# Patient Record
Sex: MALE | Race: WHITE | NOT HISPANIC OR LATINO | Employment: OTHER | URBAN - METROPOLITAN AREA
[De-identification: names, ages, dates, MRNs, and addresses within clinical notes are randomized per-mention and may not be internally consistent; named-entity substitution may affect disease eponyms.]

---

## 2017-01-03 ENCOUNTER — MYC MEDICAL ADVICE (OUTPATIENT)
Dept: FAMILY MEDICINE | Facility: CLINIC | Age: 82
End: 2017-01-03

## 2017-01-03 DIAGNOSIS — J20.9 ACUTE BRONCHITIS WITH SYMPTOMS > 10 DAYS: ICD-10-CM

## 2017-01-03 DIAGNOSIS — A04.8 H. PYLORI INFECTION: ICD-10-CM

## 2017-01-03 DIAGNOSIS — R05.9 COUGH: Primary | ICD-10-CM

## 2017-01-04 RX ORDER — BENZONATATE 100 MG/1
100 CAPSULE ORAL 3 TIMES DAILY PRN
Qty: 42 CAPSULE | Refills: 3 | Status: SHIPPED | OUTPATIENT
Start: 2017-01-04 | End: 2017-07-07

## 2017-01-04 RX ORDER — CODEINE PHOSPHATE AND GUAIFENESIN 10; 100 MG/5ML; MG/5ML
1 SOLUTION ORAL EVERY 4 HOURS PRN
Qty: 120 ML | Refills: 1 | Status: CANCELLED | OUTPATIENT
Start: 2017-01-04

## 2017-01-04 RX ORDER — CLARITHROMYCIN 500 MG
500 TABLET ORAL 2 TIMES DAILY
Qty: 28 TABLET | Refills: 0 | Status: SHIPPED | OUTPATIENT
Start: 2017-01-04 | End: 2017-07-07

## 2017-01-04 NOTE — TELEPHONE ENCOUNTER
Patient requesting medication for cough. Both cough syrup and Tessalon pearls placed to approve if appropriate. Angela MOFFETT      Last Written Prescription Date:  11/9/16  Last Fill Quantity: 120,   # refills: 1  Last Office Visit with [facility] prescribing provider: 12/14/16  Future Office visit:    Next 5 appointments (look out 90 days)     Feb 15, 2017  8:00 AM   MyChart Physical Adult with Khoi Bartolome Chacko MD   [facility] ([facility])    [address] [place] [id number] [phone]                   Routing refill request to provider for review/approval because:  Drug not on the [facility] or [facility] [email or web address] refill protocol or controlled substance

## 2017-01-04 NOTE — TELEPHONE ENCOUNTER
Daughter, Ava states dad did get better but now the whole family has a rebound with patient having the clear runny nose and non productive cough and denies fever.  She questions cough meds and possible antibiotic.  Informed Ava to check at the pharmacy for Rx approvals and if no improvement to be seen VORB Dr. Khoi Chacko/Gia Brennan CMA ([facility])

## 2017-02-18 DIAGNOSIS — I10 HYPERTENSION GOAL BP (BLOOD PRESSURE) < 140/90: ICD-10-CM

## 2017-02-18 DIAGNOSIS — F41.9 ANXIETY: Primary | ICD-10-CM

## 2017-02-20 RX ORDER — HYDROCHLOROTHIAZIDE 25 MG/1
TABLET ORAL
Qty: 90 TABLET | Refills: 3 | Status: SHIPPED | OUTPATIENT
Start: 2017-02-20 | End: 2018-03-12

## 2017-02-20 RX ORDER — LORAZEPAM 0.5 MG/1
TABLET ORAL
Qty: 90 TABLET | Refills: 0 | Status: SHIPPED | OUTPATIENT
Start: 2017-02-20 | End: 2017-03-30

## 2017-02-20 NOTE — TELEPHONE ENCOUNTER
Lorazepam        Last Written Prescription Date:  12/14/16  Last Fill Quantity: 90,   # refills: 0  Last Office Visit with [facility] or  [facility] prescribing provider: 12/14/16  Future Office visit:       Routing refill request to provider for review/approval because:  Drug not on the [facility], [facility] or  [employer] refill protocol or controlled substance

## 2017-02-20 NOTE — TELEPHONE ENCOUNTER
Hydrochlorothiazide        Last Written Prescription Date: 10/5/16  Last Fill Quantity: 90, # refills: 3  Last Office Visit with [facility] or [facility] prescribing provider: 12/14/16       Potassium   Date Value Ref Range Status   11/09/2016 3.4 3.4 - 5.3 mmol/L Final     Creatinine   Date Value Ref Range Status   11/09/2016 1.06 0.66 - 1.25 mg/dL Final     BP Readings from Last 3 Encounters:   12/14/16 126/64   11/09/16 126/56   10/21/16 140/70

## 2017-02-21 NOTE — TELEPHONE ENCOUNTER
Script for Lorazepam faxed to [facility] pharmacy. Angela Rios LPN

## 2017-03-30 DIAGNOSIS — F41.9 ANXIETY: ICD-10-CM

## 2017-03-30 NOTE — TELEPHONE ENCOUNTER
Ativan       Last Written Prescription Date: 2/20/2017  Last Fill Quantity: 90,  # refills: 0   Last Office Visit with [facility], [facility] or [facility] prescribing provider: 2/15/2017

## 2017-03-31 RX ORDER — LORAZEPAM 0.5 MG/1
TABLET ORAL
Qty: 90 TABLET | Refills: 0 | Status: SHIPPED | OUTPATIENT
Start: 2017-03-31 | End: 2017-07-07

## 2017-07-07 ENCOUNTER — OFFICE VISIT (OUTPATIENT)
Dept: INTERNAL MEDICINE | Facility: CLINIC | Age: 82
End: 2017-07-07
Payer: COMMERCIAL

## 2017-07-07 VITALS
HEART RATE: 64 BPM | BODY MASS INDEX: 26.88 KG/M2 | DIASTOLIC BLOOD PRESSURE: 66 MMHG | TEMPERATURE: 97.3 F | WEIGHT: 182 LBS | OXYGEN SATURATION: 94 % | RESPIRATION RATE: 16 BRPM | SYSTOLIC BLOOD PRESSURE: 126 MMHG

## 2017-07-07 DIAGNOSIS — F41.9 ANXIETY: ICD-10-CM

## 2017-07-07 DIAGNOSIS — I10 ESSENTIAL HYPERTENSION WITH GOAL BLOOD PRESSURE LESS THAN 140/90: ICD-10-CM

## 2017-07-07 DIAGNOSIS — E78.5 HYPERLIPIDEMIA LDL GOAL <130: ICD-10-CM

## 2017-07-07 DIAGNOSIS — Z91.038 ALLERGY TO ANT BITE: ICD-10-CM

## 2017-07-07 DIAGNOSIS — R21 RASH: Primary | ICD-10-CM

## 2017-07-07 PROCEDURE — 99213 OFFICE O/P EST LOW 20 MIN: CPT | Performed by: INTERNAL MEDICINE

## 2017-07-07 RX ORDER — DESOXIMETASONE 2.5 MG/G
CREAM TOPICAL
Qty: 30 G | Refills: 2 | Status: CANCELLED | OUTPATIENT
Start: 2017-07-07

## 2017-07-07 RX ORDER — LORAZEPAM 0.5 MG/1
TABLET ORAL
Qty: 90 TABLET | Refills: 0 | Status: SHIPPED | OUTPATIENT
Start: 2017-07-07 | End: 2017-08-11

## 2017-07-07 RX ORDER — ATORVASTATIN CALCIUM 20 MG/1
TABLET, FILM COATED ORAL
Qty: 90 TABLET | Refills: 0 | Status: SHIPPED | OUTPATIENT
Start: 2017-07-07 | End: 2017-11-18

## 2017-07-07 RX ORDER — TRIAMCINOLONE ACETONIDE 5 MG/G
CREAM TOPICAL
Qty: 30 G | Refills: 1 | Status: SHIPPED | OUTPATIENT
Start: 2017-07-07 | End: 2018-06-11

## 2017-07-07 ASSESSMENT — PAIN SCALES - GENERAL: PAINLEVEL: NO PAIN (0)

## 2017-07-07 NOTE — MR AVS SNAPSHOT
After Visit Summary   7/7/2017    Tre Fischer    MRN: [id number]           Patient Information     Date Of Birth          9/8/1930        Visit Information        Provider Department      7/7/2017 4:30 PM Sin Gill MD [facility]        Today's Diagnoses     Rash    -  1    Anxiety        Hyperlipidemia LDL goal <130           Follow-ups after your visit        Your next 10 appointments already scheduled     Jul 07, 2017  4:30 PM CDT   SHORT with Sin Gill MD   [facility] ([facility])    [address] [place] [id number] [phone]              Who to contact     If you have questions or need follow up information about today's clinic visit or your schedule please contact [facility] directly at [phone].  Normal or non-critical lab and imaging results will be communicated to you by MyChart, letter or phone within 4 business days after the clinic has received the results. If you do not hear from us within 7 days, please contact the clinic through MyChart or phone. If you have a critical or abnormal lab result, we will notify you by phone as soon as possible.  Submit refill requests through [email or web address] or call your pharmacy and they will forward the refill request to us. Please allow 3 business days for your refill to be completed.          Additional Information About Your Visit        MyChart Information     [email or web address] gives you secure access to your electronic health record. If you see a primary care provider, you can also send messages to your care team and make appointments. If you have questions, please call your primary care clinic.  If you do not have a primary care provider, please call [phone] and they will assist you.        Care EveryWhere ID     This is your Care EveryWhere ID. This could be used by other organizations to access your [facility] medical records  [id number]        Your Vitals Were      Pulse Temperature Respirations Pulse Oximetry BMI (Body Mass Index)       64 97.3  F (36.3  C) (Temporal) 16 94% 26.88 kg/m2        Blood Pressure from Last 3 Encounters:   07/07/17 126/66   12/14/16 126/64   11/09/16 126/56    Weight from Last 3 Encounters:   07/07/17 182 lb (82.6 kg)   12/14/16 170 lb (77.1 kg)   11/09/16 166 lb (75.3 kg)              Today, you had the following     No orders found for display       Primary Care Provider Office Phone # Fax #    Khoi Bartolome Chacko [phone]       [facility] [address] [place] [address]        Equal Access to Services     EDNA AVILES : Hadii aad ku hadasho Soomaali, waaxda luqadaha, qaybta kaalmada adeegyada, waxlenin mcdonaldin melquiades phillips. So [facility] [phone].    ATENCIÓN: Si habla español, tiene a harman disposición servicios gratuitos de asistencia lingüística. Llame al [phone].    We comply with applicable federal civil rights laws and [place] laws. We do not discriminate on the basis of race, color, national origin, age, disability sex, sexual orientation or gender identity.            Thank you!     Thank you for choosing [facility]  for your care. Our goal is always to provide you with excellent care. Hearing back from our patients is one way we can continue to improve our services. Please take a few minutes to complete the written survey that you may receive in the mail after your visit with us. Thank you!             Your Updated Medication List - Protect others around you: Learn how to safely use, store and throw away your medicines at www.disposemymeds.org.          This list is accurate as of: 7/7/17  4:20 PM.  Always use your most recent med list.                   Brand Name Dispense Instructions for use Diagnosis    ascorbic acid 1000 MG Tabs    vitamin C     1 TABLET DAILY        atenolol 50 MG tablet    TENORMIN    180 tablet    One tablet twice a day    Essential  hypertension with goal blood pressure less than 140/90       atorvastatin 20 MG tablet    LIPITOR    90 tablet    1 tab PO QD (Once per day))    Hyperlipidemia LDL goal <130       azelastine 0.1 % spray    ASTELIN    30 mL    SPRAY 1-2 SPRAYS INTO BOTH NOSTRILS 2 TIMES DAILY    Chronic rhinitis       Fish Oil 500 MG Caps     30 capsule    Take  by mouth daily.    Hyperlipidemia LDL goal <130       glucosamine msm complex Tabs tablet      Take  by mouth daily.    Arthritis of left ankle or foot, Osteoarthrosis, unspecified whether generalized or localized, unspecified site       hydrochlorothiazide 25 MG tablet    HYDRODIURIL    90 tablet    TAKE 1 TABLET (25 MG) BY MOUTH DAILY    Hypertension goal BP (blood pressure) < 140/90       LORazepam 0.5 MG tablet    ATIVAN    90 tablet    TAKE ONE TABLET BY MOUTH EVERY 8 HOURS AS NEEDED FOR ANXIETY    Anxiety       Multiple vitamin  s-Minerals Caps      one every day        omeprazole 20 MG CR capsule    priLOSEC    180 capsule    Take 1 capsule (20 mg) by mouth 2 times daily    H. pylori infection       tamsulosin 0.4 MG capsule    FLOMAX    90 capsule    Take 1 capsule (0.4 mg) by mouth daily As needed    Prostate hypertrophy       TYLENOL PM EXTRA STRENGTH  MG tablet   Generic drug:  diphenhydrAMINE-acetaminophen     15 tablet    Take 1 tablet by mouth nightly as needed.        vitamin D 2000 UNITS tablet     100 tablet    Take 2,000 Units by mouth daily    Osteoarthritis of shoulder region

## 2017-07-07 NOTE — NURSING NOTE
"Chief Complaint   Patient presents with     Derm Problem     check spots on abdomen       Initial /66  Pulse 64  Temp 97.3  F (36.3  C) (Temporal)  Resp 16  Wt 182 lb (82.6 kg)  SpO2 94%  BMI 26.88 kg/m2 Estimated body mass index is 26.88 kg/(m^2) as calculated from the following:    Height as of 10/21/16: 5' 9\" (1.753 m).    Weight as of this encounter: 182 lb (82.6 kg).  Medication Reconciliation: complete    "

## 2017-07-07 NOTE — PROGRESS NOTES
SUBJECTIVE:                                                    Tre Fischer is a 86 year old male who presents to clinic today for the following health issues:    Chief Complaint   Patient presents with     Derm Problem     check spots on abdomen     2 inch spots on his abdomen for 2 days, possibly ant bites.      Itching.    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     Current Outpatient Prescriptions   Medication     LORazepam (ATIVAN) 0.5 MG tablet     atorvastatin (LIPITOR) 20 MG tablet     triamcinolone (KENALOG) 0.5 % cream     hydrochlorothiazide (HYDRODIURIL) 25 MG tablet     tamsulosin (FLOMAX) 0.4 MG capsule     azelastine (ASTELIN) 0.1 % spray     atenolol (TENORMIN) 50 MG tablet     omeprazole (PRILOSEC) 20 MG capsule     Cholecalciferol (VITAMIN D) 2000 UNITS tablet     Omega-3 Fatty Acids (FISH OIL) 500 MG CAPS     Glucos-MSM-C-Mi-Tpetwj-Qxglqf (GLUCOSAMINE MSM COMPLEX) TABS     diphenhydrAMINE-acetaminophen (TYLENOL PM EXTRA STRENGTH)  MG tablet     MULTIPLE VITAMINS-MINERALS OR CAPS     VITAMIN C 1000 MG OR TABS     [DISCONTINUED] atorvastatin (LIPITOR) 20 MG tablet     [DISCONTINUED] hydrochlorothiazide (HYDRODIURIL) 25 MG tablet     No current facility-administered medications for this visit.      Physical Exam  /66  Pulse 64  Temp 97.3  F (36.3  C) (Temporal)  Resp 16  Wt 182 lb (82.6 kg)  SpO2 94%  BMI 26.88 kg/m2  General Appearance-healthy, alert, no distress  5 lesions that are 2 inches wide on his abdomen-center lesions like a bite.    ASSESSMENT:  Ant bites with reaction, not infected but allergic reaction. Will avoid benadryl due to his age and risk. Continue with a new prescription of steroid cream and can use otc  antihistamine.    Electronically signed by Sin Gill MD

## 2017-08-08 ENCOUNTER — TELEPHONE (OUTPATIENT)
Dept: FAMILY MEDICINE | Facility: CLINIC | Age: 82
End: 2017-08-08

## 2017-08-08 NOTE — TELEPHONE ENCOUNTER
Reason for Call:  Same Day Appointment, Requested Provider:  Khoi Chacko M.D.    PCP: Khoi Chacko    Reason for visit:  Cough, fatigue.  Unable to sleep due to cough.  Acid reflux    Duration of symptoms: 3-4 days    Have you been treated for this in the past? Yes    Additional comments: would like him to see Dr Chacko sometime this week.  Pt is aware that Dr Chacko is not in today    Can we leave a detailed message on this number? YES    Phone number patient can be reached at: [phone] (J)    Best Time: any    Call taken on 8/8/2017 at 12:22 PM by Annie Pedro

## 2017-08-09 NOTE — TELEPHONE ENCOUNTER
Daughter, Farzana confirmed appointment for Friday and was instructed if they do not feel Tre can wait to be seen due to dyspnea to be seen by another provider or go to the emergency room VORB Dr. Khoi Chacko/Gia Brennan CMA ([facility]) .  Daughter states she will be checking with Tre this am and will schedule with another provider or go to ER if need//Gia Brennan/ANA([facility])

## 2017-08-11 ENCOUNTER — TELEPHONE (OUTPATIENT)
Dept: FAMILY MEDICINE | Facility: CLINIC | Age: 82
End: 2017-08-11

## 2017-08-11 ENCOUNTER — OFFICE VISIT (OUTPATIENT)
Dept: FAMILY MEDICINE | Facility: CLINIC | Age: 82
End: 2017-08-11
Payer: COMMERCIAL

## 2017-08-11 VITALS
TEMPERATURE: 97.9 F | RESPIRATION RATE: 16 BRPM | OXYGEN SATURATION: 98 % | HEART RATE: 68 BPM | SYSTOLIC BLOOD PRESSURE: 138 MMHG | DIASTOLIC BLOOD PRESSURE: 60 MMHG | WEIGHT: 180 LBS | BODY MASS INDEX: 26.58 KG/M2

## 2017-08-11 DIAGNOSIS — E78.5 HYPERLIPIDEMIA LDL GOAL <130: ICD-10-CM

## 2017-08-11 DIAGNOSIS — D50.8 IRON DEFICIENCY ANEMIA SECONDARY TO INADEQUATE DIETARY IRON INTAKE: ICD-10-CM

## 2017-08-11 DIAGNOSIS — I10 ESSENTIAL HYPERTENSION WITH GOAL BLOOD PRESSURE LESS THAN 140/90: ICD-10-CM

## 2017-08-11 DIAGNOSIS — E63.9 NUTRITIONAL DEFICIENCY: ICD-10-CM

## 2017-08-11 DIAGNOSIS — J20.9 ACUTE BRONCHITIS WITH SYMPTOMS > 10 DAYS: Primary | ICD-10-CM

## 2017-08-11 DIAGNOSIS — B35.1 DERMATOPHYTOSIS OF NAIL: ICD-10-CM

## 2017-08-11 DIAGNOSIS — F41.9 ANXIETY: ICD-10-CM

## 2017-08-11 LAB
ALBUMIN SERPL-MCNC: 3.3 G/DL (ref 3.4–5)
ALP SERPL-CCNC: 120 U/L (ref 40–150)
ALT SERPL W P-5'-P-CCNC: 26 U/L (ref 0–70)
ANION GAP SERPL CALCULATED.3IONS-SCNC: 5 MMOL/L (ref 3–14)
AST SERPL W P-5'-P-CCNC: 21 U/L (ref 0–45)
BILIRUB SERPL-MCNC: 0.7 MG/DL (ref 0.2–1.3)
BUN SERPL-MCNC: 16 MG/DL (ref 7–30)
CALCIUM SERPL-MCNC: 8.8 MG/DL (ref 8.5–10.1)
CHLORIDE SERPL-SCNC: 94 MMOL/L (ref 94–109)
CHOLEST SERPL-MCNC: 99 MG/DL
CO2 SERPL-SCNC: 32 MMOL/L (ref 20–32)
CREAT SERPL-MCNC: 1.07 MG/DL (ref 0.66–1.25)
ERYTHROCYTE [DISTWIDTH] IN BLOOD BY AUTOMATED COUNT: 13.2 % (ref 10–15)
GFR SERPL CREATININE-BSD FRML MDRD: 65 ML/MIN/1.7M2
GLUCOSE SERPL-MCNC: 89 MG/DL (ref 70–99)
HCT VFR BLD AUTO: 36.6 % (ref 40–53)
HDLC SERPL-MCNC: 43 MG/DL
HGB BLD-MCNC: 12.6 G/DL (ref 13.3–17.7)
LDLC SERPL CALC-MCNC: 36 MG/DL
MCH RBC QN AUTO: 30.7 PG (ref 26.5–33)
MCHC RBC AUTO-ENTMCNC: 34.4 G/DL (ref 31.5–36.5)
MCV RBC AUTO: 89 FL (ref 78–100)
NONHDLC SERPL-MCNC: 56 MG/DL
PLATELET # BLD AUTO: 164 10E9/L (ref 150–450)
POTASSIUM SERPL-SCNC: 4.1 MMOL/L (ref 3.4–5.3)
PROT SERPL-MCNC: 6.4 G/DL (ref 6.8–8.8)
RBC # BLD AUTO: 4.1 10E12/L (ref 4.4–5.9)
SODIUM SERPL-SCNC: 131 MMOL/L (ref 133–144)
TRIGL SERPL-MCNC: 100 MG/DL
WBC # BLD AUTO: 8.8 10E9/L (ref 4–11)

## 2017-08-11 PROCEDURE — 99213 OFFICE O/P EST LOW 20 MIN: CPT | Performed by: FAMILY MEDICINE

## 2017-08-11 PROCEDURE — 85027 COMPLETE CBC AUTOMATED: CPT | Performed by: FAMILY MEDICINE

## 2017-08-11 PROCEDURE — 80053 COMPREHEN METABOLIC PANEL: CPT | Performed by: FAMILY MEDICINE

## 2017-08-11 PROCEDURE — 36415 COLL VENOUS BLD VENIPUNCTURE: CPT | Performed by: FAMILY MEDICINE

## 2017-08-11 PROCEDURE — 80061 LIPID PANEL: CPT | Performed by: FAMILY MEDICINE

## 2017-08-11 RX ORDER — KETOCONAZOLE 20 MG/G
CREAM TOPICAL 2 TIMES DAILY
Qty: 30 G | Refills: 1 | Status: SHIPPED | OUTPATIENT
Start: 2017-08-11 | End: 2018-11-06

## 2017-08-11 RX ORDER — LORAZEPAM 0.5 MG/1
TABLET ORAL
Qty: 90 TABLET | Refills: 0 | Status: SHIPPED | OUTPATIENT
Start: 2017-08-11 | End: 2017-11-18

## 2017-08-11 RX ORDER — CETIRIZINE HYDROCHLORIDE 10 MG/1
10 TABLET ORAL EVERY EVENING
Qty: 30 TABLET | Refills: 1 | COMMUNITY
Start: 2017-08-11 | End: 2019-03-27

## 2017-08-11 RX ORDER — CLARITHROMYCIN 500 MG
500 TABLET ORAL 2 TIMES DAILY
Qty: 20 TABLET | Refills: 0 | Status: SHIPPED | OUTPATIENT
Start: 2017-08-11 | End: 2018-06-11

## 2017-08-11 RX ORDER — PREDNISONE 20 MG/1
20 TABLET ORAL 2 TIMES DAILY
Qty: 10 TABLET | Refills: 0 | Status: SHIPPED | OUTPATIENT
Start: 2017-08-11 | End: 2018-06-11

## 2017-08-11 ASSESSMENT — ANXIETY QUESTIONNAIRES
1. FEELING NERVOUS, ANXIOUS, OR ON EDGE: NOT AT ALL
IF YOU CHECKED OFF ANY PROBLEMS ON THIS QUESTIONNAIRE, HOW DIFFICULT HAVE THESE PROBLEMS MADE IT FOR YOU TO DO YOUR WORK, TAKE CARE OF THINGS AT HOME, OR GET ALONG WITH OTHER PEOPLE: NOT DIFFICULT AT ALL
3. WORRYING TOO MUCH ABOUT DIFFERENT THINGS: NOT AT ALL
GAD7 TOTAL SCORE: 0
2. NOT BEING ABLE TO STOP OR CONTROL WORRYING: NOT AT ALL
6. BECOMING EASILY ANNOYED OR IRRITABLE: NOT AT ALL
5. BEING SO RESTLESS THAT IT IS HARD TO SIT STILL: NOT AT ALL
7. FEELING AFRAID AS IF SOMETHING AWFUL MIGHT HAPPEN: NOT AT ALL

## 2017-08-11 ASSESSMENT — PATIENT HEALTH QUESTIONNAIRE - PHQ9
5. POOR APPETITE OR OVEREATING: NOT AT ALL
SUM OF ALL RESPONSES TO PHQ QUESTIONS 1-9: 0

## 2017-08-11 ASSESSMENT — PAIN SCALES - GENERAL: PAINLEVEL: NO PAIN (0)

## 2017-08-11 NOTE — TELEPHONE ENCOUNTER
Pharmacist,Lou  notified ok to dispense Flomax VORB Dr. Khoi Chacko/Gia Brennan CMA ([facility])

## 2017-08-11 NOTE — NURSING NOTE
"Chief Complaint   Patient presents with     Cough     Fatigue       Initial /62 (BP Location: Left arm, Patient Position: Chair, Cuff Size: Adult Regular)  Pulse 68  Temp 97.9  F (36.6  C) (Temporal)  Resp 16  Wt 180 lb (81.6 kg)  SpO2 98%  BMI 26.58 kg/m2 Estimated body mass index is 26.58 kg/(m^2) as calculated from the following:    Height as of 10/21/16: 5' 9\" (1.753 m).    Weight as of this encounter: 180 lb (81.6 kg).   Signed original Prescription for Lorazepam faxed to [facility] Dr. Khoi Chacko/Gia Brennan CMA ([facility])   Medication Reconciliation: complete  "

## 2017-08-11 NOTE — TELEPHONE ENCOUNTER
Pharmacy calling to alert Dr Chacko that there is a medication interaction with a prescription he wrote today and pts generic flomax.  Please call and advise.

## 2017-08-11 NOTE — PROGRESS NOTES
Tre Fischer is a 86 year old male who presents to clinic today for Cough and Fatigue   He complains of cough    For more than the last week patient has had a productive cough with colored sputum, some chills and just feeling tired and fatigued. He had this once before in the last year required antibiotics. His usual nasal symptoms are about the same. Some mild nasal congestion but not a lot of actual runny discharge. It is worse at night. He does not have cardiac symptoms. His legs do not swell. All his medications are the same.    Patient has been treating his toenails with Vicks 4 months. This somewhat keeps the presumed fungal problem in control. He is starting to see that some of his fingernails are starting to look the same with discoloration yellow in nature, irregularity of the nail.     He is well known to me.    Past medical, surgical, social histories were reviewed and updated.  Social History   Substance Use Topics     Smoking status: Former Smoker     Packs/day: 1.00     Years: 10.00     Types: Cigarettes     Quit date: 1/1/1972     Smokeless tobacco: Never Used     Alcohol use No     Current Outpatient Prescriptions   Medication Sig     cetirizine (ZYRTEC) 10 MG tablet Take 1 tablet (10 mg) by mouth every evening     LORazepam (ATIVAN) 0.5 MG tablet TAKE ONE TABLET BY MOUTH EVERY 8 HOURS AS NEEDED FOR ANXIETY     clarithromycin (BIAXIN) 500 MG tablet Take 1 tablet (500 mg) by mouth 2 times daily     predniSONE (DELTASONE) 20 MG tablet Take 1 tablet (20 mg) by mouth 2 times daily     ketoconazole (NIZORAL) 2 % cream Apply topically 2 times daily     atorvastatin (LIPITOR) 20 MG tablet 1 tab PO QD (Once per day))     hydrochlorothiazide (HYDRODIURIL) 25 MG tablet TAKE 1 TABLET (25 MG) BY MOUTH DAILY     tamsulosin (FLOMAX) 0.4 MG capsule Take 1 capsule (0.4 mg) by mouth daily As needed     atenolol (TENORMIN) 50 MG tablet One tablet twice a day     omeprazole (PRILOSEC) 20 MG capsule Take 1 capsule  (20 mg) by mouth 2 times daily     Cholecalciferol (VITAMIN D) 2000 UNITS tablet Take 2,000 Units by mouth daily     Omega-3 Fatty Acids (FISH OIL) 500 MG CAPS Take  by mouth daily.     Glucos-MSM-C-Re-Hrhzzs-Pzitbj (GLUCOSAMINE MSM COMPLEX) TABS Take  by mouth daily.     diphenhydrAMINE-acetaminophen (TYLENOL PM EXTRA STRENGTH)  MG tablet Take 1 tablet by mouth nightly as needed.     MULTIPLE VITAMINS-MINERALS OR CAPS one every day     VITAMIN C 1000 MG OR TABS 1 TABLET DAILY     triamcinolone (KENALOG) 0.5 % cream Apply sparingly to affected area three times daily.     azelastine (ASTELIN) 0.1 % spray SPRAY 1-2 SPRAYS INTO BOTH NOSTRILS 2 TIMES DAILY     No current facility-administered medications for this visit.        Allergies   Allergen Reactions     No Known Drug Allergies        Patient Active Problem List   Diagnosis     Impotence of organic origin     Varicose veins of legs     Prostate hypertrophy     Advance care planning     History  of basal cell carcinoma     Chronic rhinitis     Chronic left shoulder pain     Reflux esophagitis     Iron deficiency anemia     Essential hypertension with goal blood pressure less than 140/90     Anxiety       REVIEW OF SYSTEMS:  Constitutional, HEENT, cardiovascular, pulmonary, gi and gu systems are negative, except as otherwise noted.  States also just his usual complaints of age with more tiredness, fatigue, aches and pains.      EXAM:/60  Pulse 68  Temp 97.9  F (36.6  C) (Temporal)  Resp 16  Wt 180 lb (81.6 kg)  SpO2 98%  BMI 26.58 kg/m2  GENERAL APPEARANCE: healthy, alert, mild distress, cooperative and fatigued  HENT: ear canals and TM's normal and nose and mouth without ulcers or lesions  NECK: no adenopathy, no asymmetry, masses, or scars and thyroid normal to palpation  RESP: Slightly diminished breath sounds but clear. With breathing he developed cough which sounds productive.  CV: Regular with no edema of extremities  SKIN: Thickened index  and thumb nail with irregularity on both hands. Other fingernails look okay. Did not assess toenails today  Results for orders placed or performed in visit on 08/11/17   Lipid Profile (Chol, Trig, HDL, LDL calc)   Result Value Ref Range    Cholesterol 99 <200 mg/dL    Triglycerides 100 <150 mg/dL    HDL Cholesterol 43 >39 mg/dL    LDL Cholesterol Calculated 36 <100 mg/dL    Non HDL Cholesterol 56 <130 mg/dL   Comprehensive metabolic panel (BMP + Alb, Alk Phos, ALT, AST, Total. Bili, TP)   Result Value Ref Range    Sodium 131 (L) 133 - 144 mmol/L    Potassium 4.1 3.4 - 5.3 mmol/L    Chloride 94 94 - 109 mmol/L    Carbon Dioxide 32 20 - 32 mmol/L    Anion Gap 5 3 - 14 mmol/L    Glucose 89 70 - 99 mg/dL    Urea Nitrogen 16 7 - 30 mg/dL    Creatinine 1.07 0.66 - 1.25 mg/dL    GFR Estimate 65 >60 mL/min/1.7m2    GFR Estimate If Black 79 >60 mL/min/1.7m2    Calcium 8.8 8.5 - 10.1 mg/dL    Bilirubin Total 0.7 0.2 - 1.3 mg/dL    Albumin 3.3 (L) 3.4 - 5.0 g/dL    Protein Total 6.4 (L) 6.8 - 8.8 g/dL    Alkaline Phosphatase 120 40 - 150 U/L    ALT 26 0 - 70 U/L    AST 21 0 - 45 U/L   CBC with platelets   Result Value Ref Range    WBC 8.8 4.0 - 11.0 10e9/L    RBC Count 4.10 (L) 4.4 - 5.9 10e12/L    Hemoglobin 12.6 (L) 13.3 - 17.7 g/dL    Hematocrit 36.6 (L) 40.0 - 53.0 %    MCV 89 78 - 100 fl    MCH 30.7 26.5 - 33.0 pg    MCHC 34.4 31.5 - 36.5 g/dL    RDW 13.2 10.0 - 15.0 %    Platelet Count 164 150 - 450 10e9/L   .    ASSESSMENT:    ICD-10-CM    1. Acute bronchitis with symptoms > 10 days J20.9 clarithromycin (BIAXIN) 500 MG tablet     predniSONE (DELTASONE) 20 MG tablet   2. Anxiety F41.9 LORazepam (ATIVAN) 0.5 MG tablet   3. Dermatophytosis of nail B35.1 ketoconazole (NIZORAL) 2 % cream   4. Hyperlipidemia LDL goal <130 E78.5 Lipid Profile (Chol, Trig, HDL, LDL calc)     Comprehensive metabolic panel (BMP + Alb, Alk Phos, ALT, AST, Total. Bili, TP)   5. Essential hypertension with goal blood pressure less than 140/90 I10  Comprehensive metabolic panel (BMP + Alb, Alk Phos, ALT, AST, Total. Bili, TP)     CBC with platelets   6. Iron deficiency anemia secondary to inadequate dietary iron intake D50.8 CBC with platelets       PLAN:  Clarithromycin which worked last time he had this infection after trial of other antibiotics will be used today.  Topical ketoconazole for the nails now. Continue Vicks.  I do not think his cholesterol medications are problem. Laboratory done today.  Also checking laboratory because of hypertension and previous iron deficiency anemia.      Dicussed general issues around the treatment, evaluation and prevetion  Reviewed medications and side effects in detail.  Return to clinic 3 months or longer for routine care sooner if this episode is not clear. He is encouraged to eat more because of low albumin and low protein. His hemoglobin is better at 12.2 but still not back to normal.  AVS printed. Electronically signed by Khoi Chacko MD.

## 2017-08-11 NOTE — MR AVS SNAPSHOT
After Visit Summary   8/11/2017    Tre Fischer    MRN: [id number]           Patient Information     Date Of Birth          9/8/1930        Visit Information        Provider Department      8/11/2017 9:00 AM Khoi Chacko MD [facility]        Today's Diagnoses     Acute bronchitis with symptoms > 10 days    -  1    Anxiety        Dermatophytosis of nail        Hyperlipidemia LDL goal <130        Essential hypertension with goal blood pressure less than 140/90        Iron deficiency anemia secondary to inadequate dietary iron intake          Care Instructions    Use the antibiotics twice daily and prednisone 5 days once.   Keep up all other treatment.   I expect noticed relief early next week but really good in week to 10 days.   Use new cream and Vicks once daily          Follow-ups after your visit        Who to contact     If you have questions or need follow up information about today's clinic visit or your schedule please contact [facility] directly at [phone].  Normal or non-critical lab and imaging results will be communicated to you by MyChart, letter or phone within 4 business days after the clinic has received the results. If you do not hear from us within 7 days, please contact the clinic through [facility] or phone. If you have a critical or abnormal lab result, we will notify you by phone as soon as possible.  Submit refill requests through [employer] or call your pharmacy and they will forward the refill request to us. Please allow 3 business days for your refill to be completed.          Additional Information About Your Visit        MyChart Information     [facility] gives you secure access to your electronic health record. If you see a primary care provider, you can also send messages to your care team and make appointments. If you have questions, please call your primary care clinic.  If you do not have a primary care provider, please call  [phone] and they will assist you.        Care EveryWhere ID     This is your Care EveryWhere ID. This could be used by other organizations to access your [place] medical records  [id number]        Your Vitals Were     Pulse Temperature Respirations Pulse Oximetry BMI (Body Mass Index)       68 97.9  F (36.6  C) (Temporal) 16 98% 26.58 kg/m2        Blood Pressure from Last 3 Encounters:   08/11/17 138/60   07/07/17 126/66   12/14/16 126/64    Weight from Last 3 Encounters:   08/11/17 180 lb (81.6 kg)   07/07/17 182 lb (82.6 kg)   12/14/16 170 lb (77.1 kg)              We Performed the Following     CBC with platelets     Comprehensive metabolic panel (BMP + Alb, Alk Phos, ALT, AST, Total. Bili, TP)     Lipid Profile (Chol, Trig, HDL, LDL calc)          Today's Medication Changes          These changes are accurate as of: 8/11/17  9:25 AM.  If you have any questions, ask your nurse or doctor.               Start taking these medicines.        Dose/Directions    clarithromycin 500 MG tablet   Commonly known as:  BIAXIN   Used for:  Acute bronchitis with symptoms > 10 days   Started by:  Khoi Chacko MD        Dose:  500 mg   Take 1 tablet (500 mg) by mouth 2 times daily   Quantity:  20 tablet   Refills:  0       ketoconazole 2 % cream   Commonly known as:  NIZORAL   Used for:  Dermatophytosis of nail   Started by:  Khoi Chacko MD        Apply topically 2 times daily   Quantity:  30 g   Refills:  1       predniSONE 20 MG tablet   Commonly known as:  DELTASONE   Used for:  Acute bronchitis with symptoms > 10 days   Started by:  Khoi Chacko MD        Dose:  20 mg   Take 1 tablet (20 mg) by mouth 2 times daily   Quantity:  10 tablet   Refills:  0            Where to get your medicines      These medications were sent to [facility] [place] [address]  [address] [place] [address], [place] [address]     Phone:  [phone]      clarithromycin 500 MG tablet    ketoconazole 2 % cream    predniSONE 20 MG tablet         Some of these will need a paper prescription and others can be bought over the counter.  Ask your nurse if you have questions.     Bring a paper prescription for each of these medications     LORazepam 0.5 MG tablet                Primary Care Provider Office Phone # Fax #    Khoi Chacko [phone]       [address] [place] [address]        Equal Access to Services     EDNA AVILES : Hadii aad ku hadasho Soomaali, waaxda luqadaha, qaybta kaalmada adeegyada, waxay idiin hayaan adeeg kharalianne benavidez . So [facility] [phone].    ATENCIÓN: Si habla español, tiene a harman disposición servicios gratuitos de asistencia lingüística. [facility] [phone].    We comply with applicable federal civil rights laws and [place] laws. We do not discriminate on the basis of race, color, national origin, age, disability sex, sexual orientation or gender identity.            Thank you!     Thank you for choosing [facility]  for your care. Our goal is always to provide you with excellent care. Hearing back from our patients is one way we can continue to improve our services. Please take a few minutes to complete the written survey that you may receive in the mail after your visit with us. Thank you!             Your Updated Medication List - Protect others around you: Learn how to safely use, store and throw away your medicines at www.disposemymeds.org.          This list is accurate as of: 8/11/17  9:25 AM.  Always use your most recent med list.                   Brand Name Dispense Instructions for use Diagnosis    ascorbic acid 1000 MG Tabs    vitamin C     1 TABLET DAILY        atenolol 50 MG tablet    TENORMIN    180 tablet    One tablet twice a day    Essential hypertension with goal blood pressure less than 140/90       atorvastatin 20 MG tablet    LIPITOR    90 tablet    1 tab PO QD (Once  per day))    Hyperlipidemia LDL goal <130       azelastine 0.1 % spray    ASTELIN    30 mL    SPRAY 1-2 SPRAYS INTO BOTH NOSTRILS 2 TIMES DAILY    Chronic rhinitis       cetirizine 10 MG tablet    zyrTEC    30 tablet    Take 1 tablet (10 mg) by mouth every evening        clarithromycin 500 MG tablet    BIAXIN    20 tablet    Take 1 tablet (500 mg) by mouth 2 times daily    Acute bronchitis with symptoms > 10 days       Fish Oil 500 MG Caps     30 capsule    Take  by mouth daily.    Hyperlipidemia LDL goal <130       glucosamine msm complex Tabs tablet      Take  by mouth daily.    Arthritis of left ankle or foot, Osteoarthrosis, unspecified whether generalized or localized, unspecified site       hydrochlorothiazide 25 MG tablet    HYDRODIURIL    90 tablet    TAKE 1 TABLET (25 MG) BY MOUTH DAILY    Hypertension goal BP (blood pressure) < 140/90       ketoconazole 2 % cream    NIZORAL    30 g    Apply topically 2 times daily    Dermatophytosis of nail       LORazepam 0.5 MG tablet    ATIVAN    90 tablet    TAKE ONE TABLET BY MOUTH EVERY 8 HOURS AS NEEDED FOR ANXIETY    Anxiety       Multiple vitamin  s-Minerals Caps      one every day        omeprazole 20 MG CR capsule    priLOSEC    180 capsule    Take 1 capsule (20 mg) by mouth 2 times daily    H. pylori infection       predniSONE 20 MG tablet    DELTASONE    10 tablet    Take 1 tablet (20 mg) by mouth 2 times daily    Acute bronchitis with symptoms > 10 days       tamsulosin 0.4 MG capsule    FLOMAX    90 capsule    Take 1 capsule (0.4 mg) by mouth daily As needed    Prostate hypertrophy       triamcinolone 0.5 % cream    KENALOG    30 g    Apply sparingly to affected area three times daily.    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        vitamin D 2000 UNITS tablet     100 tablet    Take 2,000 Units by mouth daily     Osteoarthritis of shoulder region

## 2017-08-11 NOTE — PATIENT INSTRUCTIONS
Use the antibiotics twice daily and prednisone 5 days once.   Keep up all other treatment.   I expect noticed relief early next week but really good in week to 10 days.   Use new cream and Vicks once daily

## 2017-08-12 ASSESSMENT — ANXIETY QUESTIONNAIRES: GAD7 TOTAL SCORE: 0

## 2017-11-18 DIAGNOSIS — E78.5 HYPERLIPIDEMIA LDL GOAL <130: ICD-10-CM

## 2017-11-18 DIAGNOSIS — A04.8 H. PYLORI INFECTION: ICD-10-CM

## 2017-11-18 DIAGNOSIS — F41.9 ANXIETY: ICD-10-CM

## 2017-11-20 DIAGNOSIS — F41.9 ANXIETY: ICD-10-CM

## 2017-11-20 RX ORDER — LORAZEPAM 0.5 MG/1
TABLET ORAL
Qty: 90 TABLET | Refills: 0 | Status: SHIPPED | OUTPATIENT
Start: 2017-11-20 | End: 2017-11-20

## 2017-11-20 NOTE — TELEPHONE ENCOUNTER
LORazepam (ATIVAN) 0.5 MG tablet 90 tablet 0 11/20/2017  No      Sig: TAKE 1 TABLET BY MOUTH EVERY 8 HOURS AS NEEDED FOR ANXIETY     Patient just had filled today.  Fabio Hebert MA

## 2017-11-20 NOTE — TELEPHONE ENCOUNTER
LORazepam (ATIVAN) 0.5 MG tablet      Last Written Prescription Date:  8/11/17  Last Fill Quantity: 90,   # refills: 0  Future Office visit:       Routing refill request to provider for review/approval because:  Drug not on the G, P or [facility] refill protocol or controlled substance

## 2017-11-20 NOTE — TELEPHONE ENCOUNTER
Requested Prescriptions   Pending Prescriptions Disp Refills     atorvastatin (LIPITOR) 20 MG tablet [Pharmacy Med Name: ATORVASTATIN 20 MG TABLET 20 TAB] 90 tablet 0     Sig: TAKE 1 TABLET BY MOUTH ONCE DAILY    Statins Protocol Passed    11/18/2017  2:58 PM       Passed - LDL on file in past 12 months    Recent Labs   Lab Test  08/11/17   0928   LDL  36            Passed - No abnormal creatine kinase in past 12 months    No lab results found.         Passed - Recent or future visit with authorizing provider    Patient had office visit in the last year or has a visit in the next 30 days with authorizing provider.  See chart review.              Passed - Patient is age 18 or older

## 2017-11-22 RX ORDER — LORAZEPAM 0.5 MG/1
TABLET ORAL
Qty: 90 TABLET | Refills: 0 | Status: SHIPPED | OUTPATIENT
Start: 2017-11-22 | End: 2018-01-24

## 2017-11-22 RX ORDER — ATORVASTATIN CALCIUM 20 MG/1
TABLET, FILM COATED ORAL
Qty: 90 TABLET | Refills: 0 | Status: SHIPPED | OUTPATIENT
Start: 2017-11-22 | End: 2018-03-12

## 2018-01-15 DIAGNOSIS — J31.0 CHRONIC RHINITIS: ICD-10-CM

## 2018-01-15 DIAGNOSIS — R05.9 COUGH: ICD-10-CM

## 2018-01-16 NOTE — TELEPHONE ENCOUNTER
"Requested Prescriptions   Pending Prescriptions Disp Refills     benzonatate (TESSALON) 100 MG capsule [Pharmacy Med Name: BENZONATATE 100 MG  CAP] 42 capsule 3     Sig: TAKE 1 CAPSULE (100 MG) BY MOUTH 3 TIMES DAILY AS NEEDED FOR COUGH    There is no refill protocol information for this order        azelastine (ASTELIN) 0.1 % spray [Pharmacy Med Name: AZELASTINE 0.1% (137 MCG) S 0.1 SOLN] 30 mL 5     Sig: SPRAY 1-2 SPRAYS INTO BOTH NOSTRILS 2 TIMES DAILY    Antihistamines Protocol Passed    1/15/2018 11:22 AM       Passed - Recent or future visit with authorizing provider's specialty    Patient had office visit in the last year or has a visit in the next 30 days with authorizing provider.  See \"Patient Info\" tab in inbasket, or \"Choose Columns\" in Meds & Orders section of the refill encounter.              Passed - Patient is age 3 or older    Apply age and/or weight-based dosing for peds patients age 3 and older.    Forward request to provider for patients under the age of 3.            Last Written Prescription Date:  12/14/16  Last Fill Quantity: 30 mL,  # refills: 5   Last Office Visit with [facility] or [facility] prescribing provider:  8/11/17   Future Office Visit:       "

## 2018-01-18 RX ORDER — AZELASTINE 1 MG/ML
SPRAY, METERED NASAL
Qty: 30 ML | Refills: 7 | Status: SHIPPED | OUTPATIENT
Start: 2018-01-18 | End: 2018-01-19

## 2018-01-18 NOTE — TELEPHONE ENCOUNTER
Routing TESSALON  refill request to provider for review/approval because:  Drug not on the [facility] refill protocol   PAOLA Luque

## 2018-01-19 ENCOUNTER — MYC REFILL (OUTPATIENT)
Dept: FAMILY MEDICINE | Facility: CLINIC | Age: 83
End: 2018-01-19

## 2018-01-19 DIAGNOSIS — J31.0 CHRONIC RHINITIS: ICD-10-CM

## 2018-01-19 DIAGNOSIS — R05.9 COUGH: ICD-10-CM

## 2018-01-19 RX ORDER — BENZONATATE 100 MG/1
CAPSULE ORAL
Qty: 42 CAPSULE | Refills: 1 | Status: SHIPPED | OUTPATIENT
Start: 2018-01-19 | End: 2018-01-19

## 2018-01-19 NOTE — TELEPHONE ENCOUNTER
"Requested Prescriptions   Pending Prescriptions Disp Refills     benzonatate (TESSALON) 100 MG capsule 42 capsule 1    There is no refill protocol information for this order        azelastine (ASTELIN) 0.1 % spray 30 mL 7    Antihistamines Protocol Passed    1/19/2018  1:13 PM       Passed - Recent or future visit with authorizing provider's specialty    Patient had office visit in the last year or has a visit in the next 30 days with authorizing provider.  See \"Patient Info\" tab in inbasket, or \"Choose Columns\" in Meds & Orders section of the refill encounter.            Passed - Patient is age 3 or older    Apply age and/or weight-based dosing for peds patients age 3 and older.    Forward request to provider for patients under the age of 3.            Last Written Prescription Date:  1/19/2018  Last Fill Quantity: 42,  # refills: 1   Last Office Visit with [facility] or [facility] prescribing provider:  8/11/18   Future Office Visit:     Please refuse both medication have been reused already. PLEASE REFUSE.   "

## 2018-01-19 NOTE — TELEPHONE ENCOUNTER
Message from MyChart:  Original authorizing provider: MD Tre Vega RADHAMargaret Fischer would like a refill of the following medications:  benzonatate (TESSALON) 100 MG capsule [Khoi Chacko MD]  azelastine (ASTELIN) 0.1 % spray [Khoi Chacko MD]    Preferred pharmacy: [facility] [place] - [address] [address]    Comment:

## 2018-01-22 RX ORDER — AZELASTINE 1 MG/ML
2 SPRAY, METERED NASAL 2 TIMES DAILY
Qty: 30 ML | Refills: 7 | Status: SHIPPED | OUTPATIENT
Start: 2018-01-22 | End: 2019-03-23

## 2018-01-23 RX ORDER — BENZONATATE 100 MG/1
200 CAPSULE ORAL 3 TIMES DAILY PRN
Qty: 42 CAPSULE | Refills: 1 | Status: SHIPPED | OUTPATIENT
Start: 2018-01-23 | End: 2018-04-06

## 2018-01-23 NOTE — TELEPHONE ENCOUNTER
Routing Tessalon pearles refill request to provider for review/approval because:  Drug not on the [facility] refill protocol   PAOLA Luque

## 2018-01-24 DIAGNOSIS — F41.9 ANXIETY: ICD-10-CM

## 2018-01-24 RX ORDER — LORAZEPAM 0.5 MG/1
TABLET ORAL
Qty: 90 TABLET | Refills: 0 | Status: SHIPPED | OUTPATIENT
Start: 2018-01-24 | End: 2018-03-12

## 2018-01-24 NOTE — TELEPHONE ENCOUNTER
Lorazepam 0.5 MG      Last Written Prescription Date:  11/22/17  Last Fill Quantity: 90,   # refills: 0  Last Office Visit: 8/11/17  Future Office visit:       Routing refill request to provider for review/approval because:  Drug not on the [facility], P or [facility] refill protocol or controlled substance

## 2018-03-12 DIAGNOSIS — E78.5 HYPERLIPIDEMIA LDL GOAL <130: ICD-10-CM

## 2018-03-12 DIAGNOSIS — F41.9 ANXIETY: ICD-10-CM

## 2018-03-12 DIAGNOSIS — I10 HYPERTENSION GOAL BP (BLOOD PRESSURE) < 140/90: ICD-10-CM

## 2018-03-13 RX ORDER — HYDROCHLOROTHIAZIDE 25 MG/1
TABLET ORAL
Qty: 90 TABLET | Refills: 3 | Status: SHIPPED | OUTPATIENT
Start: 2018-03-13 | End: 2019-03-02

## 2018-03-13 RX ORDER — LORAZEPAM 0.5 MG/1
TABLET ORAL
Qty: 90 TABLET | Refills: 0 | Status: SHIPPED | OUTPATIENT
Start: 2018-03-13 | End: 2018-06-07

## 2018-03-13 RX ORDER — ATORVASTATIN CALCIUM 20 MG/1
TABLET, FILM COATED ORAL
Qty: 90 TABLET | Refills: 0 | Status: SHIPPED | OUTPATIENT
Start: 2018-03-13 | End: 2018-06-07

## 2018-03-13 NOTE — TELEPHONE ENCOUNTER
"Requested Prescriptions   Pending Prescriptions Disp Refills     hydrochlorothiazide (HYDRODIURIL) 25 MG tablet [Pharmacy Med Name: HYDROCHLOROTHIAZIDE 25 MG T 25 TAB] 90 tablet 3     Sig: TAKE 1 TABLET (25 MG) BY MOUTH DAILY    Diuretics (Including Combos) Protocol Failed    3/12/2018 10:24 AM       Failed - Normal serum sodium on file in past 12 months    Recent Labs   Lab Test  08/11/17   0928   NA  131*             Passed - Blood pressure under 140/90 in past 12 months    BP Readings from Last 3 Encounters:   08/11/17 138/60   07/07/17 126/66   12/14/16 126/64                Passed - Recent (12 mo) or future (30 days) visit within the authorizing provider's specialty    Patient had office visit in the last 12 months or has a visit in the next 30 days with authorizing provider or within the authorizing provider's specialty.  See \"Patient Info\" tab in inbasket, or \"Choose Columns\" in Meds & Orders section of the refill encounter.           Passed - Patient is age 18 or older       Passed - Normal serum creatinine on file in past 12 months    Recent Labs   Lab Test  08/11/17   0928   CR  1.07             Passed - Normal serum potassium on file in past 12 months    Recent Labs   Lab Test  08/11/17   0928   POTASSIUM  4.1                    LORazepam (ATIVAN) 0.5 MG tablet [Pharmacy Med Name: LORAZEPAM 0.5 MG TABLET 0.5 TAB] 90 tablet 0     Sig: TAKE ONE TABLET BY MOUTH EVERY EIGHT HOURS AS NEEDED FOR ANXIETY    There is no refill protocol information for this order        atorvastatin (LIPITOR) 20 MG tablet [Pharmacy Med Name: ATORVASTATIN 20 MG TABLET 20 TAB] 90 tablet 0     Sig: TAKE 1 TABLET BY MOUTH ONCE DAILY    Statins Protocol Passed    3/12/2018 10:24 AM       Passed - LDL on file in past 12 months    Recent Labs   Lab Test  08/11/17   0928   LDL  36            Passed - No abnormal creatine kinase in past 12 months    No lab results found.         Passed - Recent (12 mo) or future (30 days) visit within the " "authorizing provider's specialty    Patient had office visit in the last 12 months or has a visit in the next 30 days with authorizing provider or within the authorizing provider's specialty.  See \"Patient Info\" tab in inbasket, or \"Choose Columns\" in Meds & Orders section of the refill encounter.           Passed - Patient is age 18 or older          Last Written Prescription Date:  11/22/17, 2/20/17  Last Fill Quantity: 90,  # refills: 0, 3   Last Office Visit with [facility] or [facility] prescribing provider:  8/11/17   Future Office Visit:     Lorazepam 0.5 MG       Last Written Prescription Date:  1/24/18  Last Fill Quantity: 90,   # refills: 0  Last Office Visit: 8/11/17  Future Office visit:       Routing refill request to provider for review/approval because:  Drug not on the [facility], [facility] or [facility] refill protocol or controlled substance    "

## 2018-03-13 NOTE — TELEPHONE ENCOUNTER
Routing refill request to provider for review/approval because:  Drug not on the [facility] refill protocol   Labs out of range:    Marcelina Smith RN

## 2018-03-14 DIAGNOSIS — I10 ESSENTIAL HYPERTENSION WITH GOAL BLOOD PRESSURE LESS THAN 140/90: ICD-10-CM

## 2018-03-14 DIAGNOSIS — N40.0 PROSTATE HYPERTROPHY: ICD-10-CM

## 2018-03-15 RX ORDER — TAMSULOSIN HYDROCHLORIDE 0.4 MG/1
CAPSULE ORAL
Qty: 90 CAPSULE | Refills: 1 | Status: SHIPPED | OUTPATIENT
Start: 2018-03-15 | End: 2018-08-13

## 2018-03-15 RX ORDER — ATENOLOL 50 MG/1
TABLET ORAL
Qty: 180 TABLET | Refills: 1 | Status: SHIPPED | OUTPATIENT
Start: 2018-03-15 | End: 2018-08-13

## 2018-03-15 NOTE — TELEPHONE ENCOUNTER
"Requested Prescriptions   Pending Prescriptions Disp Refills     atenolol (TENORMIN) 50 MG tablet [Pharmacy Med Name: ATENOLOL 50 MG TABLET 50 TAB] 180 tablet 3     Sig: TAKE 1 TABLET BY MOUTH TWICE DAILY    Beta-Blockers Protocol Passed    3/14/2018  6:08 PM       Passed - Blood pressure under 140/90 in past 12 months    BP Readings from Last 3 Encounters:   08/11/17 138/60   07/07/17 126/66   12/14/16 126/64                Passed - Patient is age 6 or older       Passed - Recent (12 mo) or future (30 days) visit within the authorizing provider's specialty    Patient had office visit in the last 12 months or has a visit in the next 30 days with authorizing provider or within the authorizing provider's specialty.  See \"Patient Info\" tab in inbasket, or \"Choose Columns\" in Meds & Orders section of the refill encounter.            tamsulosin (FLOMAX) 0.4 MG capsule [Pharmacy Med Name: TAMSULOSIN HCL 0.4 MG CAP 0.4 CAP] 90 capsule 3     Sig: TAKE 1 CAPSULE (0.4 MG) BY MOUTH DAILY AS NEEDED    Alpha Blockers Passed    3/14/2018  6:08 PM       Passed - Blood pressure under 140/90 in past 12 months    BP Readings from Last 3 Encounters:   08/11/17 138/60   07/07/17 126/66   12/14/16 126/64                Passed - Recent (12 mo) or future (30 days) visit within the authorizing provider's specialty    Patient had office visit in the last 12 months or has a visit in the next 30 days with authorizing provider or within the authorizing provider's specialty.  See \"Patient Info\" tab in inbasket, or \"Choose Columns\" in Meds & Orders section of the refill encounter.           Passed - Patient does not have Tadalafil, Vardenafil, or Sildenafil on their medication list       Passed - Patient is 18 years of age or older          Last Written Prescription Date:  12/14/16  Last Fill Quantity: 180, 90,  # refills: 3   [place] [facility] Visit [place] [facility] or [facility] prescribing provider:  8/11/17   Future Office Visit:       "

## 2018-04-06 DIAGNOSIS — R05.9 COUGH: ICD-10-CM

## 2018-04-09 RX ORDER — BENZONATATE 100 MG/1
CAPSULE ORAL
Qty: 42 CAPSULE | Refills: 1 | Status: SHIPPED | OUTPATIENT
Start: 2018-04-09 | End: 2019-03-27

## 2018-04-09 NOTE — TELEPHONE ENCOUNTER
benzonatate       Last Written Prescription Date:  1/23/18  Last Fill Quantity: 42,   # refills: 1  Last Office Visit: 8/11/17  Future Office visit:       Routing refill request to provider for review/approval because:  Drug not on the G, P or [facility] refill protocol or controlled substance

## 2018-06-07 DIAGNOSIS — E78.5 HYPERLIPIDEMIA LDL GOAL <130: ICD-10-CM

## 2018-06-07 DIAGNOSIS — F41.9 ANXIETY: ICD-10-CM

## 2018-06-07 NOTE — TELEPHONE ENCOUNTER
"Lorazepam       Last Written Prescription Date:  3/13/18  Last Fill Quantity: 90,   # refills: 0  Last Office Visit: 8/11/17  Future Office visit:    Next 5 appointments (look out 90 days)     Jun 11, 2018  8:00 AM CDT   Office Visit with Khoi Chacko MD   [facility] ([address] [address]   [phone]                   Routing refill request to provider for review/approval because:  Drug not on the [facility], [facility] or  [facility] refill protocol or controlled substance  Requested Prescriptions   Pending Prescriptions Disp Refills     LORazepam (ATIVAN) 0.5 MG tablet [Pharmacy Med Name: LORazepam 0.5 MG TAB 0.5 TAB] 90 tablet 0     Sig: TAKE 1 TABLET BY MOUTH EVERY 8 HOURS AS NEEDED FOR ANXIETY    There is no refill protocol information for this order        atorvastatin (LIPITOR) 20 MG tablet [Pharmacy Med Name: ATORVASTATIN 20 MG TABLET 20 TAB] 90 tablet 0     Sig: TAKE 1 TABLET BY MOUTH ONCE DAILY    Statins Protocol Passed    6/7/2018  2:17 PM       Passed - LDL on file in past 12 months    Recent Labs   Lab Test  08/11/17   0928   LDL  36            Passed - No abnormal creatine kinase in past 12 months    No lab results found.            Passed - Recent (12 mo) or future (30 days) visit within the authorizing provider's specialty    Patient had office visit in the last 12 months or has a visit in the next 30 days with authorizing provider or within the authorizing provider's specialty.  See \"Patient Info\" tab in inbasket, or \"Choose Columns\" in Meds & Orders section of the refill encounter.           Passed - Patient is age 18 or older          Last Written Prescription Date:  3/13/18  Last Fill Quantity: 90,  # refills: 0   Last Office Visit with [facility] or [facility] prescribing provider:  8/11/17   Future Office Visit:    Next 5 appointments (look out 90 days)     Jun 11, 2018  8:00 AM CDT   Office Visit with Khoi Chacko MD   [place] " [facility] ([facility]    [address] [id number] [phone]

## 2018-06-08 RX ORDER — LORAZEPAM 0.5 MG/1
TABLET ORAL
Qty: 90 TABLET | Refills: 0 | Status: SHIPPED | OUTPATIENT
Start: 2018-06-08 | End: 2018-08-13

## 2018-06-08 RX ORDER — ATORVASTATIN CALCIUM 20 MG/1
TABLET, FILM COATED ORAL
Qty: 90 TABLET | Refills: 0 | Status: SHIPPED | OUTPATIENT
Start: 2018-06-08 | End: 2018-08-13

## 2018-06-11 ENCOUNTER — OFFICE VISIT (OUTPATIENT)
Dept: FAMILY MEDICINE | Facility: CLINIC | Age: 83
End: 2018-06-11
Payer: COMMERCIAL

## 2018-06-11 VITALS
OXYGEN SATURATION: 98 % | SYSTOLIC BLOOD PRESSURE: 112 MMHG | WEIGHT: 176.5 LBS | TEMPERATURE: 96.2 F | DIASTOLIC BLOOD PRESSURE: 68 MMHG | BODY MASS INDEX: 26.14 KG/M2 | RESPIRATION RATE: 16 BRPM | HEIGHT: 69 IN | HEART RATE: 56 BPM

## 2018-06-11 DIAGNOSIS — J30.0 CHRONIC VASOMOTOR RHINITIS: ICD-10-CM

## 2018-06-11 DIAGNOSIS — E63.9 NUTRITIONAL DEFICIENCY: ICD-10-CM

## 2018-06-11 DIAGNOSIS — D50.8 IRON DEFICIENCY ANEMIA SECONDARY TO INADEQUATE DIETARY IRON INTAKE: ICD-10-CM

## 2018-06-11 DIAGNOSIS — I10 ESSENTIAL HYPERTENSION WITH GOAL BLOOD PRESSURE LESS THAN 140/90: ICD-10-CM

## 2018-06-11 DIAGNOSIS — Z00.01 ENCOUNTER FOR ROUTINE ADULT HEALTH EXAMINATION WITH ABNORMAL FINDINGS: Primary | ICD-10-CM

## 2018-06-11 DIAGNOSIS — K21.00 REFLUX ESOPHAGITIS: ICD-10-CM

## 2018-06-11 DIAGNOSIS — F41.9 ANXIETY: ICD-10-CM

## 2018-06-11 LAB
ALBUMIN SERPL-MCNC: 3.5 G/DL (ref 3.4–5)
ALP SERPL-CCNC: 124 U/L (ref 40–150)
ALT SERPL W P-5'-P-CCNC: 21 U/L (ref 0–70)
ANION GAP SERPL CALCULATED.3IONS-SCNC: 11 MMOL/L (ref 3–14)
AST SERPL W P-5'-P-CCNC: 19 U/L (ref 0–45)
BILIRUB SERPL-MCNC: 0.4 MG/DL (ref 0.2–1.3)
BUN SERPL-MCNC: 18 MG/DL (ref 7–30)
CALCIUM SERPL-MCNC: 8.4 MG/DL (ref 8.5–10.1)
CHLORIDE SERPL-SCNC: 90 MMOL/L (ref 94–109)
CO2 SERPL-SCNC: 29 MMOL/L (ref 20–32)
CREAT SERPL-MCNC: 1.11 MG/DL (ref 0.66–1.25)
ERYTHROCYTE [DISTWIDTH] IN BLOOD BY AUTOMATED COUNT: 13 % (ref 10–15)
ERYTHROCYTE [SEDIMENTATION RATE] IN BLOOD BY WESTERGREN METHOD: 6 MM/H (ref 0–20)
GFR SERPL CREATININE-BSD FRML MDRD: 63 ML/MIN/1.7M2
GLUCOSE SERPL-MCNC: 93 MG/DL (ref 70–99)
HCT VFR BLD AUTO: 36.5 % (ref 40–53)
HGB BLD-MCNC: 12.7 G/DL (ref 13.3–17.7)
MCH RBC QN AUTO: 30.3 PG (ref 26.5–33)
MCHC RBC AUTO-ENTMCNC: 34.8 G/DL (ref 31.5–36.5)
MCV RBC AUTO: 87 FL (ref 78–100)
PLATELET # BLD AUTO: 179 10E9/L (ref 150–450)
POTASSIUM SERPL-SCNC: 3.9 MMOL/L (ref 3.4–5.3)
PROT SERPL-MCNC: 6.2 G/DL (ref 6.8–8.8)
RBC # BLD AUTO: 4.19 10E12/L (ref 4.4–5.9)
SODIUM SERPL-SCNC: 130 MMOL/L (ref 133–144)
TSH SERPL DL<=0.005 MIU/L-ACNC: 1.59 MU/L (ref 0.4–4)
WBC # BLD AUTO: 8.1 10E9/L (ref 4–11)

## 2018-06-11 PROCEDURE — 36415 COLL VENOUS BLD VENIPUNCTURE: CPT | Performed by: FAMILY MEDICINE

## 2018-06-11 PROCEDURE — 80053 COMPREHEN METABOLIC PANEL: CPT | Performed by: FAMILY MEDICINE

## 2018-06-11 PROCEDURE — 85652 RBC SED RATE AUTOMATED: CPT | Performed by: FAMILY MEDICINE

## 2018-06-11 PROCEDURE — 85027 COMPLETE CBC AUTOMATED: CPT | Performed by: FAMILY MEDICINE

## 2018-06-11 PROCEDURE — G0439 PPPS, SUBSEQ VISIT: HCPCS | Performed by: FAMILY MEDICINE

## 2018-06-11 PROCEDURE — 84443 ASSAY THYROID STIM HORMONE: CPT | Performed by: FAMILY MEDICINE

## 2018-06-11 PROCEDURE — 99214 OFFICE O/P EST MOD 30 MIN: CPT | Mod: 25 | Performed by: FAMILY MEDICINE

## 2018-06-11 RX ORDER — FLUOXETINE 10 MG/1
10 CAPSULE ORAL DAILY
Qty: 30 CAPSULE | Refills: 1 | Status: SHIPPED | OUTPATIENT
Start: 2018-06-11 | End: 2018-08-13

## 2018-06-11 ASSESSMENT — ANXIETY QUESTIONNAIRES
IF YOU CHECKED OFF ANY PROBLEMS ON THIS QUESTIONNAIRE, HOW DIFFICULT HAVE THESE PROBLEMS MADE IT FOR YOU TO DO YOUR WORK, TAKE CARE OF THINGS AT HOME, OR GET ALONG WITH OTHER PEOPLE: SOMEWHAT DIFFICULT
1. FEELING NERVOUS, ANXIOUS, OR ON EDGE: SEVERAL DAYS
3. WORRYING TOO MUCH ABOUT DIFFERENT THINGS: SEVERAL DAYS
GAD7 TOTAL SCORE: 5
7. FEELING AFRAID AS IF SOMETHING AWFUL MIGHT HAPPEN: NOT AT ALL
6. BECOMING EASILY ANNOYED OR IRRITABLE: NEARLY EVERY DAY
5. BEING SO RESTLESS THAT IT IS HARD TO SIT STILL: NOT AT ALL
2. NOT BEING ABLE TO STOP OR CONTROL WORRYING: NOT AT ALL

## 2018-06-11 ASSESSMENT — PATIENT HEALTH QUESTIONNAIRE - PHQ9: 5. POOR APPETITE OR OVEREATING: NOT AT ALL

## 2018-06-11 ASSESSMENT — PAIN SCALES - GENERAL: PAINLEVEL: NO PAIN (0)

## 2018-06-11 NOTE — MR AVS SNAPSHOT
After Visit Summary   6/11/2018    Tre Fischer    MRN: [id number]           Patient Information     Date Of Birth          9/8/1930        Visit Information        Provider Department      6/11/2018 8:00 AM Khoi Chacko MD [facility]        Today's Diagnoses     Encounter for routine adult health examination with abnormal findings    -  1    Reflux esophagitis        Essential hypertension with goal blood pressure less than 140/90        Chronic vasomotor rhinitis        Iron deficiency anemia secondary to inadequate dietary iron intake        Nutritional deficiency        Anxiety          Care Instructions      Preventive Health Recommendations:       Male Ages 65 and over    Yearly exam:             See your health care provider every year in order to  o   Review health changes.   o   Discuss preventive care.    o   Review your medicines if your doctor has prescribed any.    Talk with your health care provider about whether you should have a test to screen for prostate cancer (PSA).    Every 3 years, have a diabetes test (fasting glucose). If you are at risk for diabetes, you should have this test more often.    Every 5 years, have a cholesterol test. Have this test more often if you are at risk for high cholesterol or heart disease.     Every 10 years, have a colonoscopy. Or, have a yearly FIT test (stool test). These exams will check for colon cancer.    Talk to with your health care provider about screening for Abdominal Aortic Aneurysm if you have a family history of AAA or have a history of smoking.  Shots:     Get a flu shot each year.     Get a tetanus shot every 10 years.     Talk to your doctor about your pneumonia vaccines. There are now two you should receive - Pneumovax (PPSV 23) and Prevnar (PCV 13).    Talk to your doctor about a shingles vaccine.     Talk to your doctor about the hepatitis B vaccine.  Nutrition:     Eat at least 5 servings of fruits and  vegetables each day.     Eat whole-grain bread, whole-wheat pasta and brown rice instead of white grains and rice.     Talk to your doctor about Calcium and Vitamin D.   Lifestyle    Exercise for at least 150 minutes a week (30 minutes a day, 5 days a week). This will help you control your weight and prevent disease.     Limit alcohol to one drink per day.     No smoking.     Wear sunscreen to prevent skin cancer.     See your dentist every six months for an exam and cleaning.     See your eye doctor every 1 to 2 years to screen for conditions such as glaucoma, macular degeneration and cataracts.    mucinex could be stopped and restarted if cough returns, generic guaifenesin is ok.    I encourage Astelin spray twice daily for two weeks    Use lorazepam 1/2 tab at bedtime.  Start fluoxetine 10 mg in AM. This may help both with sleep all the time and reduce anxiety with more energy    If side effects aren't to bad, usually they go away or diminish greatly in 2 weeks.    Keep me informed.    Congratulations on your decision not to drive          Follow-ups after your visit        Who to contact     If you have questions or need follow up information about today's clinic visit or your schedule please contact [facility] directly at [phone].  Normal or non-critical lab and imaging results will be communicated to you by [employer], letter or phone within 4 business days after the clinic has received the results. If you do not hear from us within 7 days, please contact the clinic through [employer] or phone. If you have a critical or abnormal lab result, we will notify you by phone as soon as possible.  Submit refill requests through [employer] or call your pharmacy and they will forward the refill request to us. Please allow 3 business days for your refill to be completed.          Additional Information About Your Visit        [employer] Information     [employer] gives you secure access to your electronic health  "record. If you see a primary care provider, you can also send messages to your care team and make appointments. If you have questions, please call your primary care clinic.  If you do not have a primary care provider, please call [phone] and they will assist you.        Care EveryWhere ID     This is your Care EveryWhere ID. This could be used by other organizations to access your [place] medical records  [id number]        Your Vitals Were     Pulse Temperature Respirations Height Pulse Oximetry BMI (Body Mass Index)    56 96.2  F (35.7  C) (Temporal) 16 5' 8.5\" (1.74 m) 98% 26.45 kg/m2       Blood Pressure from Last 3 Encounters:   06/11/18 112/68   08/11/17 138/60   07/07/17 126/66    Weight from Last 3 Encounters:   06/11/18 176 lb 8 oz (80.1 kg)   08/11/17 180 lb (81.6 kg)   07/07/17 182 lb (82.6 kg)              We Performed the Following     CBC with platelets     Comprehensive metabolic panel     Erythrocyte sedimentation rate auto     TSH          Today's Medication Changes          These changes are accurate as of 6/11/18  8:39 AM.  If you have any questions, ask your nurse or doctor.               Start taking these medicines.        Dose/Directions    FLUoxetine 10 MG capsule   Commonly known as:  PROzac   Used for:  Anxiety   Started by:  Khoi Chacko MD        Dose:  10 mg   Take 1 capsule (10 mg) by mouth daily   Quantity:  30 capsule   Refills:  1         Stop taking these medicines if you haven't already. Please contact your care team if you have questions.     glucosamine msm complex Tabs tablet   Stopped by:  Khoi Chacko MD           predniSONE 20 MG tablet   Commonly known as:  DELTASONE   Stopped by:  Khoi Chacko MD           triamcinolone 0.5 % cream   Commonly known as:  KENALOG   Stopped by:  Khoi Chacko MD                Where to get your medicines      These medications were sent to [facility] [place] [address] [address] [facility]  [address] [place] [address], [place] [address]     Phone:  [phone]     FLUoxetine 10 MG capsule                Primary Care Provider Office Phone # Fax #    Khoi Chacko [phone]       [address] [place] [address]        Equal Access to Services     [facility]: Hadii aad ku hadasho Soomaali, waaxda luqadaha, qaybta kaalmada adeegyada, waxay idiin hayaan adeeg kharash la'aan . So [facility] [phone].    ATENCIÓN: Si habla español, tiene a harman disposición servicios gratuitos de asistencia lingüística. [facility] [phone].    We comply with applicable federal civil rights laws and [place] laws. We do not discriminate on the basis of race, color, national origin, age, disability, sex, sexual orientation, or gender identity.            Thank you!     Thank you for choosing [facility]  for your care. Our goal is always to provide you with excellent care. Hearing back from our patients is one way we can continue to improve our services. Please take a few minutes to complete the written survey that you may receive in the mail after your visit with us. Thank you!             Your Updated Medication List - Protect others around you: Learn how to safely use, store and throw away your medicines at www.disposemymeds.org.          This list is accurate as of 6/11/18  8:39 AM.  Always use your most recent med list.                   Brand Name Dispense Instructions for use Diagnosis    ascorbic acid 1000 MG Tabs    vitamin C     1 TABLET DAILY        atenolol 50 MG tablet    TENORMIN    180 tablet    TAKE 1 TABLET BY MOUTH TWICE DAILY    Essential hypertension with goal blood pressure less than 140/90       atorvastatin 20 MG tablet    LIPITOR    90 tablet    TAKE 1 TABLET BY MOUTH ONCE DAILY    Hyperlipidemia LDL goal <130       azelastine 0.1 % spray    ASTELIN    30 mL    Spray 2 sprays into both nostrils 2 times daily    Chronic  rhinitis       benzonatate 100 MG capsule    TESSALON    42 capsule    TAKE 2 CAPSULES (200 MG) BY MOUTH 3 TIMES DAILY AS NEEDED FOR COUGH    Cough       cetirizine 10 MG tablet    zyrTEC    30 tablet    Take 1 tablet (10 mg) by mouth every evening        Fish Oil 500 MG Caps     30 capsule    Take  by mouth daily.    Hyperlipidemia LDL goal <130       FLUoxetine 10 MG capsule    PROzac    30 capsule    Take 1 capsule (10 mg) by mouth daily    Anxiety       hydrochlorothiazide 25 MG tablet    HYDRODIURIL    90 tablet    TAKE 1 TABLET (25 MG) BY MOUTH DAILY    Hypertension goal BP (blood pressure) < 140/90       ketoconazole 2 % cream    NIZORAL    30 g    Apply topically 2 times daily    Dermatophytosis of nail       LORazepam 0.5 MG tablet    ATIVAN    90 tablet    TAKE 1 TABLET BY MOUTH EVERY 8 HOURS AS NEEDED FOR ANXIETY    Anxiety       Multiple vitamin  s-Minerals Caps      one every day        omeprazole 20 MG CR capsule    priLOSEC    180 capsule    TAKE 1 CAPSULE (20 MG) BY MOUTH 2 TIMES DAILY    H. pylori infection       tamsulosin 0.4 MG capsule    FLOMAX    90 capsule    TAKE 1 CAPSULE (0.4 MG) BY MOUTH DAILY AS NEEDED    Prostate hypertrophy       TYLENOL PM EXTRA STRENGTH  MG tablet   Generic drug:  diphenhydrAMINE-acetaminophen     15 tablet    Take 1 tablet by mouth nightly as needed.        vitamin D 2000 units tablet     100 tablet    Take 2,000 Units by mouth daily    Osteoarthritis of shoulder region

## 2018-06-11 NOTE — PROGRESS NOTES
SUBJECTIVE:   Tre Fischer is a 87 year old male who presents for Preventive Visit.      Are you in the first 12 months of your Medicare Part B coverage?  No    Healthy Habits:    Do you get at least three servings of calcium containing foods daily (dairy, green leafy vegetables, etc.)? no, taking calcium and/or vitamin D supplement: yes -     Amount of exercise or daily activities, outside of work: None    Problems taking medications regularly No    Medication side effects: No    Have you had an eye exam in the past two years? no    Do you see a dentist twice per year? No-once    Do you have sleep apnea, excessive snoring or daytime drowsiness?yes-daytime drowsiness      Ability to successfully perform activities of daily living: Yes, no assistance needed    Home safety:  none identified     Hearing impairment: Yes, Need to ask people to speak up or repeat themselves.    Fall risk:           COGNITIVE SCREEN  1) Repeat 3 items (Banana, Sunrise, Chair)    2) Clock draw: NORMAL  3) 3 item recall: Recalls 2 objects   Results: NORMAL clock, 1-2 items recalled: COGNITIVE IMPAIRMENT LESS LIKELY    Mini-CogTM Copyright [employer]. Licensed by the author for use in [facility]; reprinted with permission (johana@[facility]). All rights reserved.            Hypertension Follow-up      Outpatient blood pressures are not being checked.    Low Salt Diet: low salt      Reviewed and updated as needed this visit by clinical staff         Reviewed and updated as needed this visit by Provider        Social History   Substance Use Topics     Smoking status: Former Smoker     Packs/day: 1.00     Years: 10.00     Types: Cigarettes     Quit date: 1/1/1972     Smokeless tobacco: Never Used     Alcohol use No       If you drink alcohol do you typically have >3 drinks per day or >7 drinks per week? No                        Today's PHQ-2 Score:   PHQ-2 ( 1999 [employer]) 12/14/2016 1/6/2016   Q1: Little interest or pleasure in doing  things 0 0   Q2: Feeling down, depressed or hopeless 0 0   PHQ-2 Score 0 0       Do you feel safe in your environment - Yes    Do you have a Health Care Directive?: Yes: Advance Directive has been received and scanned.    Current providers sharing in care for this patient include:   Patient Care Team:  Khoi Chacko MD as PCP - General    The following health maintenance items are reviewed in Epic and correct as of today:  Health Maintenance   Topic Date Due     PASCUAL QUESTIONNAIRE 6 MONTHS  02/11/2018     FALL RISK ASSESSMENT  08/11/2018     PHQ-9 Q1YR  08/11/2018     INFLUENZA VACCINE (Season Ended) 09/01/2018     TETANUS IMMUNIZATION (SYSTEM ASSIGNED)  09/09/2018     ADVANCE DIRECTIVE PLANNING Q5 YRS  12/12/2021     PNEUMOCOCCAL  Completed     Patient has generally become weaker all the time.  States that he has just trouble managing getting from place to place.  Notes this both upper extremities and lower extremities.  He and his daughter report that he is sleeping all the time.  His appetite is adequate by his report and does not seem to be sufficient for his daughter.  He has continual nasal discharge and irritation.  Mucinex has helped at least twice since January when he has had prolonged respiratory cough with productive sputum of yellow color.  Currently he thinks he is doing better.  He does not have fever with this.  He does notice his heart pounding.  He does not have chest pain.  His legs are not swollen.  He is little abdominal pain.  Bowel habits are regular.  He has frequent urination and often up at night.  He had a black and blue upper right extremity shoulder to elbow 1 day after he assisted his wife to bed and he felt a little discomfort in his right shoulder area.  This was a couple months ago.  He just feels his arms or little weaker at this time.  Still has trouble with his left shoulder.  Since pain does not disrupt his sleep.  Still has heartburn despite omeprazole daily.  Takes Tums.   "Often will have a very acid burn.  Tylenol PM is taken at bedtime only.  Cetirizine occasionally but and it is not felt causes tiredness.        ROS:  CONSTITUTIONAL: NEGATIVE for fever, chills, change in weight  INTEGUMENTARY/SKIN: NEGATIVE for worrisome rashes, moles or lesions  ENT/MOUTH: See above  RESP: Cough which was present significantly for 2 or 3 weeks is finally abating now.  This was without fever.  He seldom gets short of breath because he does not become active enough  CV: NEGATIVE for chest pain, palpitations or peripheral edema  GI: As above.  : frequency, hesitancy, nocturia x 2 and terminal dribbling  MUSCULOSKELETAL: Left shoulder is troublesome as always knees and hips not painful, he thinks his muscles wasting away  NEURO: POSITIVE for memory problems and weakness all over and NEGATIVE for behavior changes, coordination , dizziness/lightheadedness, dysarthria, dysesthesias, involuntary movements, gait disturbance, loss of consciousness, paralysis  and tremor   ENDOCRINE: NEGATIVE for temperature intolerance, skin/hair changes  HEME/ALLERGY/IMMUNE: NEGATIVE for bleeding problems  PSYCHIATRIC: POSITIVE forfatigue and NEGATIVE for essentially all else    OBJECTIVE:   /68  Pulse 56  Temp 96.2  F (35.7  C) (Temporal)  Resp 16  Ht 5' 8.5\" (1.74 m)  Wt 176 lb 8 oz (80.1 kg)  SpO2 98%  BMI 26.45 kg/m2 Estimated body mass index is 26.58 kg/(m^2) as calculated from the following:    Height as of 10/21/16: 5' 9\" (1.753 m).    Weight as of 8/11/17: 180 lb (81.6 kg).  EXAM:   GENERAL: healthy, alert and no distress  EYES: Eyes grossly normal to inspection, PERRL and conjunctivae and sclerae normal  HENT: normal cephalic/atraumatic, oropharynx clear and oral mucous membranes moist  NECK: no adenopathy, no asymmetry, masses, or scars and thyroid normal to palpation  RESP: lungs clear to auscultation - no rales, rhonchi or wheezes  CV: Seems regular but patient is barrel chested and sounds are " distant.  No murmurs heard.  No edema of extremities  ABDOMEN: soft, nontender, no hepatosplenomegaly, no masses and bowel sounds normal  MS: no gross musculoskeletal defects noted, no edema, may be persistent bump of right biceps unchanged by making a Nabil-type muscle   SKIN: no suspicious lesions or rashes  NEURO: Patient was more unsteady on his feet but was able to get out of the chair and on the table relatively quickly.  Clock drawing was abnormal with numbers outside the clock hands inside.  Certainly recalling information and discussion today is aware of current events etc.  PSYCH: mentation appears normal, affect flat, anxious, fatigued, judgement and insight intact, appearance well groomed and with judgment indicated by patient's own decision to stop driving because he realizes he is not safe.    ASSESSMENT / PLAN:   1. Encounter for routine adult health examination with abnormal findings  Physically doing well but probably at 87 body is wearing out.    2. Reflux esophagitis  No specific change.  He will continue antacids and PPI.  Controlling acid is more important and potential risks of proton pump inhibitor  - CBC with platelets    3. Essential hypertension with goal blood pressure less than 140/90  Well-controlled, I doubt this is reason for his tiredness and fatigue.  - TSH  - Comprehensive metabolic panel  - CBC with platelets  - Erythrocyte sedimentation rate auto    4. Chronic vasomotor rhinitis  He will continue Mucinex and Astelin.  I do not believe his cough or congestion is more serious such as cardiac or pulmonary related at this time.  This would need to be kept in mind.  His reflux may be contributing.    5. Iron deficiency anemia secondary to inadequate dietary iron intake  Waiting lab  - CBC with platelets    6. Nutritional deficiency  Waiting lab  - Comprehensive metabolic panel    7. Anxiety  Trial of fluoxetine instead of lorazepam.  Reducing dose of lorazepam.  This medication may be  "contributing to tiredness.  Fluoxetine might be more stimulating and help with anxiety.  This is something he and family will keep in mind.  - FLUoxetine (PROZAC) 10 MG capsule; Take 1 capsule (10 mg) by mouth daily  Dispense: 30 capsule; Refill: 1    End of Life Planning:  Patient currently has an advanced directive: Yes.  Practitioner is supportive of decision.    COUNSELING:  Reviewed preventive health counseling, as reflected in patient instructions       Regular exercise       Healthy diet/nutrition        Estimated body mass index is 26.58 kg/(m^2) as calculated from the following:    Height as of 10/21/16: 5' 9\" (1.753 m).    Weight as of 8/11/17: 180 lb (81.6 kg).       reports that he quit smoking about 46 years ago. His smoking use included Cigarettes. He has a 10.00 pack-year smoking history. He has never used smokeless tobacco.      Appropriate preventive services were discussed with this patient, including applicable screening as appropriate for cardiovascular disease, diabetes, osteopenia/osteoporosis, and glaucoma.  As appropriate for age/gender, discussed screening for colorectal cancer, prostate cancer, breast cancer, and cervical cancer. Checklist reviewing preventive services available has been given to the patient.    Reviewed patients plan of care and provided an AVS. The Complex Care Plan (for patients with higher acuity and needing more deliberate coordination of services) and patient would benefit from some home care assistance himself at this time.  Will ask for referral.  For Tre meets the Care Plan requirement. This Care Plan has been established and reviewed with the Patient and daughter.    Counseling Resources:  ATP IV Guidelines  Pooled Cohorts Equation Calculator  Breast Cancer Risk Calculator  FRAX Risk Assessment  ICSI Preventive Guidelines  Dietary Guidelines for Americans, 2010  USDA's MyPlate  ASA Prophylaxis  Lung CA Screening    Khoi Bartolome Chacko MD  [facility] " [place]

## 2018-06-11 NOTE — PATIENT INSTRUCTIONS
Preventive Health Recommendations:       Male Ages 65 and over    Yearly exam:             See your health care provider every year in order to  o   Review health changes.   o   Discuss preventive care.    o   Review your medicines if your doctor has prescribed any.    Talk with your health care provider about whether you should have a test to screen for prostate cancer (PSA).    Every 3 years, have a diabetes test (fasting glucose). If you are at risk for diabetes, you should have this test more often.    Every 5 years, have a cholesterol test. Have this test more often if you are at risk for high cholesterol or heart disease.     Every 10 years, have a colonoscopy. Or, have a yearly FIT test (stool test). These exams will check for colon cancer.    Talk to with your health care provider about screening for Abdominal Aortic Aneurysm if you have a family history of AAA or have a history of smoking.  Shots:     Get a flu shot each year.     Get a tetanus shot every 10 years.     Talk to your doctor about your pneumonia vaccines. There are now two you should receive - Pneumovax (PPSV 23) and Prevnar (PCV 13).    Talk to your doctor about a shingles vaccine.     Talk to your doctor about the hepatitis B vaccine.  Nutrition:     Eat at least 5 servings of fruits and vegetables each day.     Eat whole-grain bread, whole-wheat pasta and brown rice instead of white grains and rice.     Talk to your doctor about Calcium and Vitamin D.   Lifestyle    Exercise for at least 150 minutes a week (30 minutes a day, 5 days a week). This will help you control your weight and prevent disease.     Limit alcohol to one drink per day.     No smoking.     Wear sunscreen to prevent skin cancer.     See your dentist every six months for an exam and cleaning.     See your eye doctor every 1 to 2 years to screen for conditions such as glaucoma, macular degeneration and cataracts.    mucinex could be stopped and restarted if cough returns,  generic guaifenesin is ok.    I encourage Astelin spray twice daily for two weeks    Use lorazepam 1/2 tab at bedtime.  Start fluoxetine 10 mg in AM. This may help both with sleep all the time and reduce anxiety with more energy    If side effects aren't to bad, usually they go away or diminish greatly in 2 weeks.    Keep me informed.    Congratulations on your decision not to drive

## 2018-06-12 ASSESSMENT — ANXIETY QUESTIONNAIRES: GAD7 TOTAL SCORE: 5

## 2018-07-12 ENCOUNTER — OFFICE VISIT (OUTPATIENT)
Dept: AUDIOLOGY | Facility: CLINIC | Age: 83
End: 2018-07-12
Payer: COMMERCIAL

## 2018-07-12 DIAGNOSIS — H90.3 SENSORINEURAL HEARING LOSS, BILATERAL: Primary | ICD-10-CM

## 2018-07-12 PROCEDURE — 99207 ZZC NO CHARGE LOS: CPT | Performed by: AUDIOLOGIST

## 2018-07-12 PROCEDURE — 92592 HC HEARING AID CHECK, MONAURAL: CPT | Mod: LT | Performed by: AUDIOLOGIST

## 2018-07-12 NOTE — MR AVS SNAPSHOT
After Visit Summary   7/12/2018    Tre Fischer    MRN: [id number]           Patient Information     Date Of Birth          9/8/1930        Visit Information        Provider Department      7/12/2018 8:00 AM Venita Vang AuD [facility]        Today's Diagnoses     Sensorineural hearing loss, bilateral    -  1       Follow-ups after your visit        Who to contact     If you have questions or need follow up information about today's clinic visit or your schedule please contact [facility] directly at [phone].  Normal or non-critical lab and imaging results will be communicated to you by Intrinsityhart, letter or phone within 4 business days after the clinic has received the results. If you do not hear from us within 7 days, please contact the clinic through DotAlignt or phone. If you have a critical or abnormal lab result, we will notify you by phone as soon as possible.  Submit refill requests through Blueleaf or call your pharmacy and they will forward the refill request to us. Please allow 3 business days for your refill to be completed.          Additional Information About Your Visit        MyChart Information     Blueleaf gives you secure access to your electronic health record. If you see a primary care provider, you can also send messages to your care team and make appointments. If you have questions, please call your primary care clinic.  If you do not have a primary care provider, please call [phone] and they will assist you.        Care EveryWhere ID     This is your Care EveryWhere ID. This could be used by other organizations to access your [place] medical records  [id number]         Blood Pressure from Last 3 Encounters:   06/11/18 112/68   08/11/17 138/60   07/07/17 126/66    Weight from Last 3 Encounters:   06/11/18 176 lb 8 oz (80.1 kg)   08/11/17 180 lb (81.6 kg)   07/07/17 182 lb (82.6 kg)              We Performed the Following      HEARING AID CHECK, [place]        Primary Care Provider Office Phone # Fax #    Khoi Bartolome Chacko [phone] [address] [place] [address]        Equal Access to Services     EDNA AVILES : Hadjames cyndy quiñones eduardoo Soziaali, waaxda luqadaha, qaybta kaalmada adeegyada, waxlenin alcarazn dm zamora laLindseyjoycelyn phillips. So [facility] [phone].    ATENCIÓN: Si habla español, tiene a harman disposición servicios gratuitos de asistencia lingüística. Llame al [phone].    We comply with applicable federal civil rights laws and [place] laws. We do not discriminate on the basis of race, color, national origin, age, disability, sex, sexual orientation, or gender identity.            Thank you!     Thank you for choosing [facility]  for your care. Our goal is always to provide you with excellent care. Hearing back from our patients is one way we can continue to improve our services. Please take a few minutes to complete the written survey that you may receive in the mail after your visit with us. Thank you!             Your Updated Medication List - Protect others around you: Learn how to safely use, store and throw away your medicines at www.disposemymeds.org.          This list is accurate as of 7/12/18 12:00 PM.  Always use your most recent med list.                   Brand Name Dispense Instructions for use Diagnosis    ascorbic acid 1000 MG Tabs    vitamin C     1 TABLET DAILY        atenolol 50 MG tablet    TENORMIN    180 tablet    TAKE 1 TABLET BY MOUTH TWICE DAILY    Essential hypertension with goal blood pressure less than 140/90       atorvastatin 20 MG tablet    LIPITOR    90 tablet    TAKE 1 TABLET BY MOUTH ONCE DAILY    Hyperlipidemia LDL goal <130       azelastine 0.1 % spray    ASTELIN    30 mL    Spray 2 sprays into both nostrils 2 times daily    Chronic rhinitis       benzonatate 100 MG capsule    TESSALON    42 capsule    TAKE 2 CAPSULES (200 MG) BY MOUTH 3 TIMES  DAILY AS NEEDED FOR COUGH    Cough       cetirizine 10 MG tablet    zyrTEC    30 tablet    Take 1 tablet (10 mg) by mouth every evening        Fish Oil 500 MG Caps     30 capsule    Take  by mouth daily.    Hyperlipidemia LDL goal <130       FLUoxetine 10 MG capsule    PROzac    30 capsule    Take 1 capsule (10 mg) by mouth daily    Anxiety       hydrochlorothiazide 25 MG tablet    HYDRODIURIL    90 tablet    TAKE 1 TABLET (25 MG) BY MOUTH DAILY    Hypertension goal BP (blood pressure) < 140/90       ketoconazole 2 % cream    NIZORAL    30 g    Apply topically 2 times daily    Dermatophytosis of nail       LORazepam 0.5 MG tablet    ATIVAN    90 tablet    TAKE 1 TABLET BY MOUTH EVERY 8 HOURS AS NEEDED FOR ANXIETY    Anxiety       Multiple vitamin  s-Minerals Caps      one every day        omeprazole 20 MG CR capsule    priLOSEC    180 capsule    TAKE 1 CAPSULE (20 MG) BY MOUTH 2 TIMES DAILY    H. pylori infection       tamsulosin 0.4 MG capsule    FLOMAX    90 capsule    TAKE 1 CAPSULE (0.4 MG) BY MOUTH DAILY AS NEEDED    Prostate hypertrophy       TYLENOL PM EXTRA STRENGTH  MG tablet   Generic drug:  diphenhydrAMINE-acetaminophen     15 tablet    Take 1 tablet by mouth nightly as needed.        vitamin D 2000 units tablet     100 tablet    Take 2,000 Units by mouth daily    Osteoarthritis of shoulder region

## 2018-07-12 NOTE — PROGRESS NOTES
Hearing Aid Check     SUBJECTIVE:  Tre Fischer is a 87 year old year old male, seen today for a hearing aid check at [facility]. Patient reports the hearing aids were purchased from the [employer] and he would like to transfer care to a facility closer to home.  He reported his left Gordon 3 Series i110 LAUREN hearing hearing aid stopped working a week or so ago.        OBJECTIVE:  Listening check revealed the device was non-functioning.  Visual inspection revealed clear  and yrn ports, vacuumed preventatively (right and left). Replaced the left  (left 50 #3), function was restore and biologic check revealed the device was functioning.     Data logging: Hearing aid not connected to programming software.     PLAN: Return to clinic as needed for hearing aid maintenance and programming.  If listening difficulty persists recommended hearing evaluation and hearing aid programming.    Morelia Lewis.  [place] Licensed Audiologist #[id number]

## 2018-08-13 DIAGNOSIS — E78.5 HYPERLIPIDEMIA LDL GOAL <130: ICD-10-CM

## 2018-08-13 DIAGNOSIS — N40.0 PROSTATE HYPERTROPHY: ICD-10-CM

## 2018-08-13 DIAGNOSIS — I10 ESSENTIAL HYPERTENSION WITH GOAL BLOOD PRESSURE LESS THAN 140/90: ICD-10-CM

## 2018-08-13 DIAGNOSIS — F41.9 ANXIETY: ICD-10-CM

## 2018-08-14 NOTE — TELEPHONE ENCOUNTER
"Requested Prescriptions   Pending Prescriptions Disp Refills     FLUoxetine (PROZAC) 10 MG capsule [Pharmacy Med Name: FLUoxetine HCL 10 MG CAPS 10 CAP] 30 capsule 1    Last Written Prescription Date:  6-11-18  Last Fill Quantity: 30,  # refills: 1   Last office visit: 6/11/2018 with prescribing provider:  6-11-18   Future Office Visit:     Sig: TAKE ONE CAPSULE (10 MG) BY MOUTH DAILY    SSRIs Protocol Passed    8/13/2018 10:33 AM       Passed - Recent (12 mo) or future (30 days) visit within the authorizing provider's specialty    Patient had office visit in the last 12 months or has a visit in the next 30 days with authorizing provider or within the authorizing provider's specialty.  See \"Patient Info\" tab in inbasket, or \"Choose Columns\" in Meds & Orders section of the refill encounter.           Passed - Patient is age 18 or older        LORazepam (ATIVAN) 0.5 MG tablet [Pharmacy Med Name: LORazepam 0.5 MG TABS 0.5 TAB] 90 tablet 0    Last Written Prescription Date:  6-8-18  Last Fill Quantity: 90,   # refills: 0  Last Office Visit: 6-11-18  Future Office visit:       Routing refill request to provider for review/approval because:  Drug not on the [facility] or [facility] refill protocol or controlled substance   Sig: TAKE ONE TABLET BY MOUTH EVERY EIGHT HOURS AS NEEDED FOR ANXIETY    There is no refill protocol information for this order        atorvastatin (LIPITOR) 20 MG tablet [Pharmacy Med Name: ATORVASTATIN 20 MG TABLET 20 TAB] 90 tablet 0    Last Written Prescription Date:  6-8-18  Last Fill Quantity: 90,  # refills: 0   Last office visit: 6/11/2018 with prescribing provider:  6-11-18   Future Office Visit:     Sig: TAKE ONE TABLET BY MOUTH ONCE DAILY    Statins Protocol Failed    8/13/2018 10:33 AM       Failed - LDL on file in past 12 months    Recent Labs   Lab Test  08/11/17   0928   LDL  36            Passed - No abnormal creatine kinase in past 12 months    No lab results found.            Passed - " "Recent (12 mo) or future (30 days) visit within the authorizing provider's specialty    Patient had office visit in the last 12 months or has a visit in the next 30 days with authorizing provider or within the authorizing provider's specialty.  See \"Patient Info\" tab in inbasket, or \"Choose Columns\" in Meds & Orders section of the refill encounter.           Passed - Patient is age 18 or older        atenolol (TENORMIN) 50 MG tablet [Pharmacy Med Name: ATENOLOL 50 MG TABLET 50 TAB] 180 tablet 1    Last Written Prescription Date:  3/15/18  Last Fill Quantity: 180,  # refills: 0   Last office visit: 6/11/2018 with prescribing provider:  6/11/18   Future Office Visit:     Sig: TAKE ONE TABLET BY MOUTH TWICE DAILY    Beta-Blockers Protocol Passed    8/13/2018 10:33 AM       Passed - Blood pressure under 140/90 in past 12 months    BP Readings from Last 3 Encounters:   06/11/18 112/68   08/11/17 138/60   07/07/17 126/66                Passed - Patient is age 6 or older       Passed - Recent (12 mo) or future (30 days) visit within the authorizing provider's specialty    Patient had office visit in the last 12 months or has a visit in the next 30 days with authorizing provider or within the authorizing provider's specialty.  See \"Patient Info\" tab in inbasket, or \"Choose Columns\" in Meds & Orders section of the refill encounter.            tamsulosin (FLOMAX) 0.4 MG capsule [Pharmacy Med Name: TAMSULOSIN HCL 0.4 MG CAP 0.4 CAP] 90 capsule 1    Last Written Prescription Date:  3-15-18  Last Fill Quantity: 90,  # refills: 1   Last office visit: 6/11/2018 with prescribing provider:  6/11/18   Future Office Visit:     Sig: TAKE ONE CAPSULE BY MOUTH ONCE DAILY AS NEEDED    Alpha Blockers Passed    8/13/2018 10:33 AM       Passed - Blood pressure under 140/90 in past 12 months    BP Readings from Last 3 Encounters:   06/11/18 112/68   08/11/17 138/60   07/07/17 126/66                Passed - Recent (12 mo) or future (30 days) " "visit within the authorizing provider's specialty    Patient had office visit in the last 12 months or has a visit in the next 30 days with authorizing provider or within the authorizing provider's specialty.  See \"Patient Info\" tab in inbasket, or \"Choose Columns\" in Meds & Orders section of the refill encounter.           Passed - Patient does not have Tadalafil, Vardenafil, or Sildenafil on their medication list       Passed - Patient is 18 years of age or older          "

## 2018-08-15 RX ORDER — ATORVASTATIN CALCIUM 20 MG/1
TABLET, FILM COATED ORAL
Qty: 90 TABLET | Refills: 2 | Status: SHIPPED | OUTPATIENT
Start: 2018-08-15 | End: 2019-06-24

## 2018-08-15 RX ORDER — TAMSULOSIN HYDROCHLORIDE 0.4 MG/1
CAPSULE ORAL
Qty: 90 CAPSULE | Refills: 2 | Status: SHIPPED | OUTPATIENT
Start: 2018-08-15 | End: 2019-03-29

## 2018-08-15 RX ORDER — FLUOXETINE 10 MG/1
CAPSULE ORAL
Qty: 30 CAPSULE | Refills: 9 | Status: SHIPPED | OUTPATIENT
Start: 2018-08-15 | End: 2019-08-20

## 2018-08-15 RX ORDER — ATENOLOL 50 MG/1
TABLET ORAL
Qty: 180 TABLET | Refills: 2 | Status: SHIPPED | OUTPATIENT
Start: 2018-08-15 | End: 2019-05-21

## 2018-08-15 RX ORDER — LORAZEPAM 0.5 MG/1
TABLET ORAL
Qty: 90 TABLET | Refills: 0 | Status: SHIPPED | OUTPATIENT
Start: 2018-08-15 | End: 2018-11-26

## 2018-08-15 NOTE — TELEPHONE ENCOUNTER
Routing refill request to provider for review/approval because:  Drug not on the [facility] refill protocol   Drug interaction warning        Marcelina Smith RN

## 2018-08-16 DIAGNOSIS — R30.0 DYSURIA: ICD-10-CM

## 2018-08-16 DIAGNOSIS — R30.0 DYSURIA: Primary | ICD-10-CM

## 2018-08-16 LAB
ALBUMIN UR-MCNC: NEGATIVE MG/DL
APPEARANCE UR: CLEAR
BILIRUB UR QL STRIP: NEGATIVE
COLOR UR AUTO: YELLOW
GLUCOSE UR STRIP-MCNC: NEGATIVE MG/DL
HGB UR QL STRIP: NEGATIVE
KETONES UR STRIP-MCNC: ABNORMAL MG/DL
LEUKOCYTE ESTERASE UR QL STRIP: NEGATIVE
NITRATE UR QL: NEGATIVE
PH UR STRIP: 6.5 PH (ref 5–7)
SOURCE: ABNORMAL
SP GR UR STRIP: 1.01 (ref 1–1.03)
UROBILINOGEN UR STRIP-ACNC: 1 EU/DL (ref 0.2–1)

## 2018-08-16 PROCEDURE — 81003 URINALYSIS AUTO W/O SCOPE: CPT | Performed by: NURSE PRACTITIONER

## 2018-10-15 DIAGNOSIS — A04.8 H. PYLORI INFECTION: ICD-10-CM

## 2018-10-17 NOTE — TELEPHONE ENCOUNTER
"Last Written Prescription Date:  11/20/17  Last Fill Quantity: 180,  # refills: 3   Last office visit: 6/11/2018 with prescribing provider:  Khoi Chacko    Future Office Visit:      Requested Prescriptions   Pending Prescriptions Disp Refills     omeprazole (PRILOSEC) 20 MG CR capsule [Pharmacy Med Name: OMEPRAZOLE DR 20 MG CAPSULE 20 CAP] 180 capsule 3     Sig: TAKE 1 CAPSULE (20 MG) BY MOUTH 2 TIMES DAILY    PPI Protocol Passed    10/15/2018  2:00 PM       Passed - Not on Clopidogrel (unless Pantoprazole ordered)       Passed - No diagnosis of osteoporosis on record       Passed - Recent (12 mo) or future (30 days) visit within the authorizing provider's specialty    Patient had office visit in the last 12 months or has a visit in the next 30 days with authorizing provider or within the authorizing provider's specialty.  See \"Patient Info\" tab in inbasket, or \"Choose Columns\" in Meds & Orders section of the refill encounter.             Passed - Patient is age 18 or older        Prescription approved per [facility] Refill Protocol.    Summer Roberson RN     "

## 2018-10-24 ENCOUNTER — TELEPHONE (OUTPATIENT)
Dept: FAMILY MEDICINE | Facility: CLINIC | Age: 83
End: 2018-10-24

## 2018-10-24 NOTE — TELEPHONE ENCOUNTER
Tricia Allen from  Home Care calling. They would like to open a private pay home health aide home care episode for this pt. Please call Tricia with verbal Ok at [phone].  Thank you,  Latonya Gallardo- Pt Rep.

## 2018-10-25 ENCOUNTER — DOCUMENTATION ONLY (OUTPATIENT)
Dept: CARE COORDINATION | Facility: CLINIC | Age: 83
End: 2018-10-25

## 2018-10-25 NOTE — TELEPHONE ENCOUNTER
Called and spoke with Martin and verbal ok given by Dr. Smith in absence of Dr. Chacko.  Valerie Lozoya MA

## 2018-10-25 NOTE — PROGRESS NOTES
"\"[facility] now requests orders and shares plan of care/discharge summaries for some patients through [facility].  Please REPLY TO THIS MESSAGE OR ROUTE BACK TO THE AUTHOR in order to give authorization for orders when needed.  This is considered a verbal order, you will still receive a faxed copy of orders for signature.  Thank you for your assistance in improving collaboration for our patients.    ORDER for start of home care for private pay services  HN  2 Every 60 Days 1    HN  For admission assessment, direct supervision and re-certification      2 Week 8    [facility] home health aid for personal cares, bathing and light housekeeping.     Martin Macias  [phone]      "

## 2018-10-25 NOTE — TELEPHONE ENCOUNTER
Home care calling to follow-up on message please advise if a covering provider would please place orders this morning.  Please advise Martin [phone]  Thank you,  Margarette Gill  Patient Representative

## 2018-11-06 DIAGNOSIS — B35.1 DERMATOPHYTOSIS OF NAIL: ICD-10-CM

## 2018-11-08 RX ORDER — KETOCONAZOLE 20 MG/G
CREAM TOPICAL
Qty: 30 G | Refills: 3 | Status: SHIPPED | OUTPATIENT
Start: 2018-11-08 | End: 2020-01-15

## 2018-11-08 NOTE — TELEPHONE ENCOUNTER
"Prescription approved per RN refill protocol.  Hannah Li RN, BSN        Nizoral  Last Written Prescription Date:  8/11/2017  Last Fill Quantity: 30,  # refills: 11   Last office visit: 6/11/2018 with prescribing provider:  6/11/2018   Future Office Visit:      Requested Prescriptions   Pending Prescriptions Disp Refills     ketoconazole (NIZORAL) 2 % cream [Pharmacy Med Name: KETOCONAZOLE 2% CREAM 2 CRM] 30 g 1     Sig: APPLY TOPICALLY 2 TIMES DAILY    Antifungal Agents Passed    11/6/2018  5:41 PM       Passed - Recent (12 mo) or future (30 days) visit within the authorizing provider's specialty    Patient had office visit in the last 12 months or has a visit in the next 30 days with authorizing provider or within the authorizing provider's specialty.  See \"Patient Info\" tab in inbasket, or \"Choose Columns\" in Meds & Orders section of the refill encounter.             Passed - Not Fluconazole or Terconazole     If oral Fluconazole or Terconazole, may refill if indicated in progress notes.           Hannah Li RN on 11/8/2018 at 2:01 PM    "

## 2018-11-15 ENCOUNTER — MYC MEDICAL ADVICE (OUTPATIENT)
Dept: FAMILY MEDICINE | Facility: CLINIC | Age: 83
End: 2018-11-15

## 2018-11-16 ENCOUNTER — E-VISIT (OUTPATIENT)
Dept: FAMILY MEDICINE | Facility: CLINIC | Age: 83
End: 2018-11-16
Payer: COMMERCIAL

## 2018-11-16 DIAGNOSIS — J20.9 ACUTE BRONCHITIS WITH SYMPTOMS > 10 DAYS: ICD-10-CM

## 2018-11-16 PROCEDURE — 99444 ZZC PHYSICIAN ONLINE EVALUATION & MANAGEMENT SERVICE: CPT | Performed by: FAMILY MEDICINE

## 2018-11-16 RX ORDER — CLARITHROMYCIN 500 MG
500 TABLET ORAL 2 TIMES DAILY
Qty: 20 TABLET | Refills: 0 | Status: SHIPPED | OUTPATIENT
Start: 2018-11-16 | End: 2019-03-27

## 2018-11-16 RX ORDER — PREDNISONE 20 MG/1
20 TABLET ORAL DAILY
Qty: 5 TABLET | Refills: 0 | Status: SHIPPED | OUTPATIENT
Start: 2018-11-16 | End: 2019-03-27

## 2018-11-16 NOTE — TELEPHONE ENCOUNTER
Patient's age and history make him at risk for serious illness.  Given a week's duration with symptoms, I will treat with antibiotic with prednisone available today if family desires or if he is not improved in a couple days.  Khoi Chacko MD

## 2018-11-16 NOTE — MR AVS SNAPSHOT
After Visit Summary   11/16/2018    Tre Fischer    MRN: [id number]           Patient Information     Date Of Birth          9/8/1930        Visit Information        Provider Department      11/16/2018 12:54 PM Khoi Chacko MD [facility]        Today's Diagnoses     Acute bronchitis with symptoms > 10 days           Follow-ups after your visit        Who to contact     If you have questions or need follow up information about today's clinic visit or your schedule please contact [facility] directly at [phone].  Normal or non-critical lab and imaging results will be communicated to you by MyChart, letter or phone within 4 business days after the clinic has received the results. If you do not hear from us within 7 days, please contact the clinic through [place] [employer] or phone. If you have a critical or abnormal lab result, we will notify you by phone as soon as possible.  Submit refill requests through startuply or call your pharmacy and they will forward the refill request to us. Please allow 3 business days for your refill to be completed.          Additional Information About Your Visit        MyChart Information     startuply gives you secure access to your electronic health record. If you see a primary care provider, you can also send messages to your care team and make appointments. If you have questions, please call your primary care clinic.  If you do not have a primary care provider, please call [phone] and they will assist you.        Care EveryWhere ID     This is your Care EveryWhere ID. This could be used by other organizations to access your [place] medical records  [id number]         Blood Pressure from Last 3 Encounters:   06/11/18 112/68   08/11/17 138/60   07/07/17 126/66    Weight from Last 3 Encounters:   06/11/18 176 lb 8 oz (80.1 kg)   08/11/17 180 lb (81.6 kg)   07/07/17 182 lb (82.6 kg)              Today, you had the following     No  orders found for display         Today's Medication Changes          These changes are accurate as of 11/16/18 11:59 PM.  If you have any questions, ask your nurse or doctor.               Start taking these medicines.        Dose/Directions    clarithromycin 500 MG tablet   Commonly known as:  BIAXIN   Used for:  Acute bronchitis with symptoms > 10 days        Dose:  500 mg   Take 1 tablet (500 mg) by mouth 2 times daily   Quantity:  20 tablet   Refills:  0       predniSONE 20 MG tablet   Commonly known as:  DELTASONE   Used for:  Acute bronchitis with symptoms > 10 days        Dose:  20 mg   Take 1 tablet (20 mg) by mouth daily   Quantity:  5 tablet   Refills:  0            Where to get your medicines      These medications were sent to [facility] [place] - [address] [place] [address]  [address] [place] [address], [place] [address]     Phone:  [phone]     clarithromycin 500 MG tablet    predniSONE 20 MG tablet                Primary Care Provider Office Phone # Fax #    Khoi Chacko -[phone]       [address] [place] [address]        Equal Access to Services     [facility]: Hadii cyndy ku hadasho Soomaali, waaxda luqadaha, qaybta kaalmada adeegyada, waxay jo hayjoycelyn benavidez . So [place] -[phone].    ATENCIÓN: Si habla español, tiene a harman disposición servicios gratuitos de asistencia lingüística. [facility] [phone].    We comply with applicable federal civil rights laws and [place] laws. We do not discriminate on the basis of race, color, national origin, age, disability, sex, sexual orientation, or gender identity.            Thank you!     Thank you for choosing [facility]  for your care. Our goal is always to provide you with excellent care. Hearing back from our patients is one way we can continue to improve our services. Please take a few minutes to complete the written survey that you  may receive in the mail after your visit with us. Thank you!             Your Updated Medication List - Protect others around you: Learn how to safely use, store and throw away your medicines at www.disposemymeds.org.          This list is accurate as of 11/16/18 11:59 PM.  Always use your most recent med list.                   Brand Name Dispense Instructions for use Diagnosis    atenolol 50 MG tablet    TENORMIN    180 tablet    TAKE ONE TABLET BY MOUTH TWICE DAILY    Essential hypertension with goal blood pressure less than 140/90       atorvastatin 20 MG tablet    LIPITOR    90 tablet    TAKE ONE TABLET BY MOUTH ONCE DAILY    Hyperlipidemia LDL goal <130       azelastine 0.1 % nasal spray    ASTELIN    30 mL    Spray 2 sprays into both nostrils 2 times daily    Chronic rhinitis       benzonatate 100 MG capsule    TESSALON    42 capsule    TAKE 2 CAPSULES (200 MG) BY MOUTH 3 TIMES DAILY AS NEEDED FOR COUGH    Cough       cetirizine 10 MG tablet    zyrTEC    30 tablet    Take 1 tablet (10 mg) by mouth every evening        clarithromycin 500 MG tablet    BIAXIN    20 tablet    Take 1 tablet (500 mg) by mouth 2 times daily    Acute bronchitis with symptoms > 10 days       Fish Oil 500 MG Caps     30 capsule    Take  by mouth daily.    Hyperlipidemia LDL goal <130       FLUoxetine 10 MG capsule    PROzac    30 capsule    TAKE ONE CAPSULE (10 MG) BY MOUTH DAILY    Anxiety       hydrochlorothiazide 25 MG tablet    HYDRODIURIL    90 tablet    TAKE 1 TABLET (25 MG) BY MOUTH DAILY    Hypertension goal BP (blood pressure) < 140/90       ketoconazole 2 % external cream    NIZORAL    30 g    APPLY TOPICALLY 2 TIMES DAILY    Dermatophytosis of nail       Multiple vitamin  s-Minerals Caps      one every day        omeprazole 20 MG DR capsule    priLOSEC    180 capsule    TAKE 1 CAPSULE (20 MG) BY MOUTH 2 TIMES DAILY    H. pylori infection       predniSONE 20 MG tablet    DELTASONE    5 tablet    Take 1 tablet (20 mg) by mouth  daily    Acute bronchitis with symptoms > 10 days       tamsulosin 0.4 MG capsule    FLOMAX    90 capsule    TAKE ONE CAPSULE BY MOUTH ONCE DAILY AS NEEDED    Prostate hypertrophy       TYLENOL PM EXTRA STRENGTH  MG tablet   Generic drug:  diphenhydrAMINE-acetaminophen     15 tablet    Take 1 tablet by mouth nightly as needed.        vitamin C 1000 MG Tabs    ASCORBIC ACID     1 TABLET DAILY        vitamin D3 2000 units tablet    CHOLECALCIFEROL    100 tablet    Take 2,000 Units by mouth daily    Osteoarthritis of shoulder region

## 2018-11-26 DIAGNOSIS — F41.9 ANXIETY: ICD-10-CM

## 2018-11-26 RX ORDER — LORAZEPAM 0.5 MG/1
TABLET ORAL
Qty: 90 TABLET | Refills: 0 | Status: SHIPPED | OUTPATIENT
Start: 2018-11-26 | End: 2019-03-27

## 2018-11-26 NOTE — TELEPHONE ENCOUNTER
Lorazepam 0.5 MG       Last Written Prescription Date:  8/15/18  Last Fill Quantity: 90,   # refills: 0  Last Office Visit: 6-11-18  Future Office visit:       Routing refill request to provider for review/approval because:  Drug not on the [facility], [facility] or [facility] refill protocol or controlled substance

## 2018-12-18 ENCOUNTER — DOCUMENTATION ONLY (OUTPATIENT)
Dept: CARE COORDINATION | Facility: CLINIC | Age: 83
End: 2018-12-18

## 2018-12-18 NOTE — PROGRESS NOTES
"\"[facility] now requests orders and shares plan of care/discharge summaries for some patients through [facility].  Please REPLY TO THIS MESSAGE OR ROUTE BACK TO THE AUTHOR in order to give authorization for orders when needed.  This is considered a verbal order, you will still receive a faxed copy of orders for signature.  Thank you for your assistance in improving collaboration for our patients.    ORDER  HN  1 d 1  For assessment, direct supervision and re-certification      2 w 8   1 w 1  [facility] home health aid for personal cares, bathing and light housekeeping.     Martin Macias  [phone]    "

## 2018-12-18 NOTE — TELEPHONE ENCOUNTER
I have chosen to use both an antibiotic he began, Biaxin, and benzonatate for the cough. At his age and recent health issues, I want to prevent this from becoming more severe to his detriment. Khoi Chacko M.D.   no

## 2019-02-12 ENCOUNTER — DOCUMENTATION ONLY (OUTPATIENT)
Dept: CARE COORDINATION | Facility: CLINIC | Age: 84
End: 2019-02-12

## 2019-02-12 NOTE — PROGRESS NOTES
"\"[facility] now requests orders and shares plan of care/discharge summaries for some patients through [facility].  Please REPLY TO THIS MESSAGE OR ROUTE BACK TO THE AUTHOR in order to give authorization for orders when needed.  This is considered a verbal order, you will still receive a faxed copy of orders for signature.  Thank you for your assistance in improving collaboration for our patients.    ORDER for private pay home care re-certification  HN  1 every 60 days 1  For  direct supervision and re-certification      1 w 1  2 w 8  [facility] home health aid for personal cares, bathing and light housekeeping.     Martin Macias  [phone]          "

## 2019-03-02 DIAGNOSIS — I10 HYPERTENSION GOAL BP (BLOOD PRESSURE) < 140/90: ICD-10-CM

## 2019-03-04 RX ORDER — HYDROCHLOROTHIAZIDE 25 MG/1
TABLET ORAL
Qty: 30 TABLET | Refills: 0 | Status: ON HOLD | OUTPATIENT
Start: 2019-03-04 | End: 2019-03-28

## 2019-03-04 NOTE — TELEPHONE ENCOUNTER
"Requested Prescriptions   Pending Prescriptions Disp Refills     hydrochlorothiazide (HYDRODIURIL) 25 MG tablet [Pharmacy Med Name: HYDROCHLOROTHIAZIDE 25 MG T 25 TAB] 90 tablet 3    Last Written Prescription Date:  3/13/18  Last Fill Quantity: 90,  # refills: 3   Last office visit: 6/11/2018 with prescribing provider:     Future Office Visit:     Sig: TAKE 1 TABLET (25 MG) BY MOUTH DAILY    Diuretics (Including Combos) Protocol Failed - 3/2/2019  2:01 PM       Failed - Normal serum sodium on file in past 12 months    Recent Labs   Lab Test 06/11/18  0850   *           Passed - Blood pressure under 140/90 in past 12 months    BP Readings from Last 3 Encounters:   06/11/18 112/68   08/11/17 138/60   07/07/17 126/66          Passed - Recent (12 mo) or future (30 days) visit within the authorizing provider's specialty    Patient had office visit in the last 12 months or has a visit in the next 30 days with authorizing provider or within the authorizing provider's specialty.  See \"Patient Info\" tab in inbasket, or \"Choose Columns\" in Meds & Orders section of the refill encounter.           Passed - Medication is active on med list       Passed - Patient is age 18 or older       Passed - Normal serum creatinine on file in past 12 months    Recent Labs   Lab Test 06/11/18  0850   CR 1.11           Passed - Normal serum potassium on file in past 12 months    Recent Labs   Lab Test 06/11/18  0850   POTASSIUM 3.9          Routing refill request to provider for review/approval because:  Labs out of range:  NA    T'd up 1 month for provider review.    Shruti Avendaño RN            "

## 2019-03-21 ENCOUNTER — MEDICAL CORRESPONDENCE (OUTPATIENT)
Dept: HEALTH INFORMATION MANAGEMENT | Facility: CLINIC | Age: 84
End: 2019-03-21

## 2019-03-23 DIAGNOSIS — J31.0 CHRONIC RHINITIS: ICD-10-CM

## 2019-03-25 RX ORDER — AZELASTINE 1 MG/ML
2 SPRAY, METERED NASAL 2 TIMES DAILY
Qty: 30 ML | Refills: 7 | Status: SHIPPED | OUTPATIENT
Start: 2019-03-25 | End: 2020-05-29

## 2019-03-25 NOTE — TELEPHONE ENCOUNTER
"azelastine  Last Written Prescription Date:  1/22/2018  Last Fill Quantity: 30 ml,  # refills: 7   Last office visit: 6/11/2018 with prescribing provider:      Future Office Visit:      Requested Prescriptions   Pending Prescriptions Disp Refills     azelastine (ASTELIN) 0.1 % nasal spray [Pharmacy Med Name: AZELASTINE 0.1% (137 MCG) S 0.1 SOLN] 30 mL 7     Sig: SPRAY 2 SPRAYS INTO BOTH NOSTRILS 2 TIMES DAILY    Antihistamines Protocol Failed - 3/23/2019 10:42 AM       Failed - Patient is 3-64 years of age    Apply weight-based dosing for peds patients age 3 - 12 years of age.    Forward request to provider for patients under the age of 3 or over the age of 64.         Passed - Recent (12 mo) or future (30 days) visit within the authorizing provider's specialty    Patient had office visit in the last 12 months or has a visit in the next 30 days with authorizing provider or within the authorizing provider's specialty.  See \"Patient Info\" tab in inbasket, or \"Choose Columns\" in Meds & Orders section of the refill encounter.             Passed - Medication is active on med list          Routing refill request to provider for review/approval because:  Failed - Patient is 3-64 years of age    Marcelina Smith RN on 3/25/2019 at 4:54 PM    "

## 2019-03-26 ENCOUNTER — MYC MEDICAL ADVICE (OUTPATIENT)
Dept: FAMILY MEDICINE | Facility: CLINIC | Age: 84
End: 2019-03-26

## 2019-03-26 DIAGNOSIS — I10 ESSENTIAL HYPERTENSION WITH GOAL BLOOD PRESSURE LESS THAN 140/90: ICD-10-CM

## 2019-03-26 DIAGNOSIS — N39.0 RECURRENT UTI (URINARY TRACT INFECTION): ICD-10-CM

## 2019-03-26 DIAGNOSIS — K21.00 REFLUX ESOPHAGITIS: ICD-10-CM

## 2019-03-26 DIAGNOSIS — E63.9 NUTRITIONAL DEFICIENCY: ICD-10-CM

## 2019-03-26 DIAGNOSIS — D50.8 IRON DEFICIENCY ANEMIA SECONDARY TO INADEQUATE DIETARY IRON INTAKE: Primary | ICD-10-CM

## 2019-03-27 ENCOUNTER — HOSPITAL ENCOUNTER (OUTPATIENT)
Facility: CLINIC | Age: 84
Setting detail: OBSERVATION
Discharge: HOME | End: 2019-03-28
Attending: EMERGENCY MEDICINE | Admitting: FAMILY MEDICINE
Payer: COMMERCIAL

## 2019-03-27 ENCOUNTER — APPOINTMENT (OUTPATIENT)
Dept: CT IMAGING | Facility: CLINIC | Age: 84
End: 2019-03-27
Attending: EMERGENCY MEDICINE
Payer: COMMERCIAL

## 2019-03-27 DIAGNOSIS — E87.1 HYPONATREMIA: ICD-10-CM

## 2019-03-27 DIAGNOSIS — R53.83 OTHER FATIGUE: ICD-10-CM

## 2019-03-27 DIAGNOSIS — I10 ESSENTIAL HYPERTENSION WITH GOAL BLOOD PRESSURE LESS THAN 140/90: Primary | ICD-10-CM

## 2019-03-27 PROBLEM — R51.9 HEAD PAIN: Status: ACTIVE | Noted: 2019-03-27

## 2019-03-27 PROBLEM — M62.81 GENERALIZED MUSCLE WEAKNESS: Status: ACTIVE | Noted: 2019-03-27

## 2019-03-27 LAB
ALBUMIN SERPL-MCNC: 3.5 G/DL (ref 3.4–5)
ALBUMIN UR-MCNC: NEGATIVE MG/DL
ALP SERPL-CCNC: 134 U/L (ref 40–150)
ALT SERPL W P-5'-P-CCNC: 23 U/L (ref 0–70)
ANION GAP SERPL CALCULATED.3IONS-SCNC: 9 MMOL/L (ref 3–14)
APPEARANCE UR: CLEAR
AST SERPL W P-5'-P-CCNC: 20 U/L (ref 0–45)
BASOPHILS # BLD AUTO: 0 10E9/L (ref 0–0.2)
BASOPHILS NFR BLD AUTO: 0.5 %
BILIRUB SERPL-MCNC: 0.5 MG/DL (ref 0.2–1.3)
BILIRUB UR QL STRIP: NEGATIVE
BUN SERPL-MCNC: 16 MG/DL (ref 7–30)
CALCIUM SERPL-MCNC: 8.1 MG/DL (ref 8.5–10.1)
CHLORIDE SERPL-SCNC: 88 MMOL/L (ref 94–109)
CO2 SERPL-SCNC: 28 MMOL/L (ref 20–32)
COLOR UR AUTO: YELLOW
CREAT SERPL-MCNC: 1.05 MG/DL (ref 0.66–1.25)
DIFFERENTIAL METHOD BLD: ABNORMAL
EOSINOPHIL NFR BLD AUTO: 1 %
ERYTHROCYTE [DISTWIDTH] IN BLOOD BY AUTOMATED COUNT: 12.8 % (ref 10–15)
GFR SERPL CREATININE-BSD FRML MDRD: 63 ML/MIN/{1.73_M2}
GLUCOSE SERPL-MCNC: 101 MG/DL (ref 70–99)
GLUCOSE UR STRIP-MCNC: NEGATIVE MG/DL
HCT VFR BLD AUTO: 34.7 % (ref 40–53)
HGB BLD-MCNC: 12.4 G/DL (ref 13.3–17.7)
HGB UR QL STRIP: NEGATIVE
HYALINE CASTS #/AREA URNS LPF: 4 /LPF (ref 0–2)
IMM GRANULOCYTES # BLD: 0.1 10E9/L (ref 0–0.4)
IMM GRANULOCYTES NFR BLD: 0.6 %
KETONES UR STRIP-MCNC: NEGATIVE MG/DL
LEUKOCYTE ESTERASE UR QL STRIP: NEGATIVE
LYMPHOCYTES # BLD AUTO: 1.9 10E9/L (ref 0.8–5.3)
LYMPHOCYTES NFR BLD AUTO: 22.8 %
MCH RBC QN AUTO: 30.5 PG (ref 26.5–33)
MCHC RBC AUTO-ENTMCNC: 35.7 G/DL (ref 31.5–36.5)
MCV RBC AUTO: 86 FL (ref 78–100)
MONOCYTES # BLD AUTO: 0.6 10E9/L (ref 0–1.3)
MONOCYTES NFR BLD AUTO: 7.4 %
MUCOUS THREADS #/AREA URNS LPF: PRESENT /LPF
NEUTROPHILS # BLD AUTO: 5.6 10E9/L (ref 1.6–8.3)
NEUTROPHILS NFR BLD AUTO: 67.7 %
NITRATE UR QL: NEGATIVE
NRBC # BLD AUTO: 0 10*3/UL
NRBC BLD AUTO-RTO: 0 /100
PH UR STRIP: 6 PH (ref 5–7)
PLATELET # BLD AUTO: 156 10E9/L (ref 150–450)
POTASSIUM SERPL-SCNC: 3.5 MMOL/L (ref 3.4–5.3)
PROT SERPL-MCNC: 6.1 G/DL (ref 6.8–8.8)
RBC # BLD AUTO: 4.06 10E12/L (ref 4.4–5.9)
RBC #/AREA URNS AUTO: <1 /HPF (ref 0–2)
SODIUM SERPL-SCNC: 125 MMOL/L (ref 133–144)
SOURCE: ABNORMAL
SP GR UR STRIP: 1.01 (ref 1–1.03)
SQUAMOUS #/AREA URNS AUTO: <1 /HPF (ref 0–1)
UROBILINOGEN UR STRIP-MCNC: 2 MG/DL (ref 0–2)
WBC # BLD AUTO: 8.3 10E9/L (ref 4–11)
WBC #/AREA URNS AUTO: 1 /HPF (ref 0–5)

## 2019-03-27 PROCEDURE — 25800030 ZZH RX IP 258 OP 636: Performed by: EMERGENCY MEDICINE

## 2019-03-27 PROCEDURE — 81001 URINALYSIS AUTO W/SCOPE: CPT | Performed by: EMERGENCY MEDICINE

## 2019-03-27 PROCEDURE — 96361 HYDRATE IV INFUSION ADD-ON: CPT

## 2019-03-27 PROCEDURE — 25000132 ZZH RX MED GY IP 250 OP 250 PS 637: Performed by: FAMILY MEDICINE

## 2019-03-27 PROCEDURE — 25000128 H RX IP 250 OP 636: Performed by: EMERGENCY MEDICINE

## 2019-03-27 PROCEDURE — 85025 COMPLETE CBC W/AUTO DIFF WBC: CPT | Performed by: EMERGENCY MEDICINE

## 2019-03-27 PROCEDURE — 70450 CT HEAD/BRAIN W/O DYE: CPT

## 2019-03-27 PROCEDURE — 99285 EMERGENCY DEPT VISIT HI MDM: CPT | Mod: 25 | Performed by: EMERGENCY MEDICINE

## 2019-03-27 PROCEDURE — 99219 ZZC INITIAL OBSERVATION CARE,LEVL II: CPT | Mod: AI | Performed by: FAMILY MEDICINE

## 2019-03-27 PROCEDURE — G0378 HOSPITAL OBSERVATION PER HR: HCPCS

## 2019-03-27 PROCEDURE — 80053 COMPREHEN METABOLIC PANEL: CPT | Performed by: EMERGENCY MEDICINE

## 2019-03-27 PROCEDURE — 99285 EMERGENCY DEPT VISIT HI MDM: CPT | Mod: 25

## 2019-03-27 PROCEDURE — 96360 HYDRATION IV INFUSION INIT: CPT

## 2019-03-27 RX ORDER — FLUOXETINE 10 MG/1
10 CAPSULE ORAL DAILY
Status: DISCONTINUED | OUTPATIENT
Start: 2019-03-28 | End: 2019-03-28 | Stop reason: HOSPADM

## 2019-03-27 RX ORDER — HYDRALAZINE HYDROCHLORIDE 20 MG/ML
10 INJECTION INTRAMUSCULAR; INTRAVENOUS EVERY 4 HOURS PRN
Status: DISCONTINUED | OUTPATIENT
Start: 2019-03-27 | End: 2019-03-28 | Stop reason: HOSPADM

## 2019-03-27 RX ORDER — ATENOLOL 25 MG/1
50 TABLET ORAL 2 TIMES DAILY
Status: DISCONTINUED | OUTPATIENT
Start: 2019-03-27 | End: 2019-03-28 | Stop reason: HOSPADM

## 2019-03-27 RX ORDER — ACETAMINOPHEN 325 MG/1
650 TABLET ORAL EVERY 4 HOURS PRN
Status: DISCONTINUED | OUTPATIENT
Start: 2019-03-27 | End: 2019-03-28 | Stop reason: HOSPADM

## 2019-03-27 RX ORDER — NALOXONE HYDROCHLORIDE 0.4 MG/ML
.1-.4 INJECTION, SOLUTION INTRAMUSCULAR; INTRAVENOUS; SUBCUTANEOUS
Status: DISCONTINUED | OUTPATIENT
Start: 2019-03-27 | End: 2019-03-28 | Stop reason: HOSPADM

## 2019-03-27 RX ORDER — ACETAMINOPHEN 650 MG/1
650 SUPPOSITORY RECTAL EVERY 4 HOURS PRN
Status: DISCONTINUED | OUTPATIENT
Start: 2019-03-27 | End: 2019-03-28 | Stop reason: HOSPADM

## 2019-03-27 RX ORDER — ASPIRIN 81 MG/1
81 TABLET ORAL DAILY
COMMUNITY
End: 2021-01-01

## 2019-03-27 RX ORDER — SODIUM CHLORIDE 9 MG/ML
1000 INJECTION, SOLUTION INTRAVENOUS CONTINUOUS
Status: DISCONTINUED | OUTPATIENT
Start: 2019-03-27 | End: 2019-03-28 | Stop reason: HOSPADM

## 2019-03-27 RX ORDER — ONDANSETRON 4 MG/1
4 TABLET, ORALLY DISINTEGRATING ORAL EVERY 6 HOURS PRN
Status: DISCONTINUED | OUTPATIENT
Start: 2019-03-27 | End: 2019-03-28 | Stop reason: HOSPADM

## 2019-03-27 RX ORDER — ONDANSETRON 2 MG/ML
4 INJECTION INTRAMUSCULAR; INTRAVENOUS EVERY 6 HOURS PRN
Status: DISCONTINUED | OUTPATIENT
Start: 2019-03-27 | End: 2019-03-28 | Stop reason: HOSPADM

## 2019-03-27 RX ORDER — LIDOCAINE 40 MG/G
CREAM TOPICAL
Status: DISCONTINUED | OUTPATIENT
Start: 2019-03-27 | End: 2019-03-28 | Stop reason: HOSPADM

## 2019-03-27 RX ORDER — ASPIRIN 81 MG/1
81 TABLET ORAL AT BEDTIME
Status: DISCONTINUED | OUTPATIENT
Start: 2019-03-27 | End: 2019-03-28 | Stop reason: HOSPADM

## 2019-03-27 RX ADMIN — SODIUM CHLORIDE 1000 ML: 9 INJECTION, SOLUTION INTRAVENOUS at 21:47

## 2019-03-27 RX ADMIN — DIPHENHYDRAMINE HYDROCHLORIDE: 25 CAPSULE ORAL at 23:06

## 2019-03-27 RX ADMIN — ASPIRIN 81 MG: 81 TABLET, COATED ORAL at 23:08

## 2019-03-27 RX ADMIN — ATENOLOL 50 MG: 25 TABLET ORAL at 23:37

## 2019-03-27 RX ADMIN — SODIUM CHLORIDE 1000 ML: 9 INJECTION, SOLUTION INTRAVENOUS at 23:11

## 2019-03-27 ASSESSMENT — MIFFLIN-ST. JEOR: SCORE: 1459.5

## 2019-03-27 NOTE — TELEPHONE ENCOUNTER
We certainly could and should have a urine sample to check for infection with reflex culture.  If they are interested in blood work he should have a comprehensive profile, CBC.

## 2019-03-28 VITALS
OXYGEN SATURATION: 97 % | DIASTOLIC BLOOD PRESSURE: 91 MMHG | TEMPERATURE: 98.4 F | HEIGHT: 68 IN | WEIGHT: 179.68 LBS | BODY MASS INDEX: 27.23 KG/M2 | RESPIRATION RATE: 16 BRPM | SYSTOLIC BLOOD PRESSURE: 163 MMHG | HEART RATE: 58 BPM

## 2019-03-28 LAB
ANION GAP SERPL CALCULATED.3IONS-SCNC: 7 MMOL/L (ref 3–14)
BUN SERPL-MCNC: 14 MG/DL (ref 7–30)
CALCIUM SERPL-MCNC: 7.9 MG/DL (ref 8.5–10.1)
CHLORIDE SERPL-SCNC: 94 MMOL/L (ref 94–109)
CO2 SERPL-SCNC: 28 MMOL/L (ref 20–32)
CREAT SERPL-MCNC: 0.96 MG/DL (ref 0.66–1.25)
GFR SERPL CREATININE-BSD FRML MDRD: 70 ML/MIN/{1.73_M2}
GLUCOSE SERPL-MCNC: 84 MG/DL (ref 70–99)
POTASSIUM SERPL-SCNC: 3.8 MMOL/L (ref 3.4–5.3)
SODIUM SERPL-SCNC: 129 MMOL/L (ref 133–144)

## 2019-03-28 PROCEDURE — 99217 ZZC OBSERVATION CARE DISCHARGE: CPT | Performed by: PEDIATRICS

## 2019-03-28 PROCEDURE — 99207 ZZC APP CREDIT; MD BILLING SHARED VISIT: CPT | Performed by: NURSE PRACTITIONER

## 2019-03-28 PROCEDURE — G0378 HOSPITAL OBSERVATION PER HR: HCPCS

## 2019-03-28 PROCEDURE — 40000894 ZZH STATISTIC OT IP EVAL DEFER

## 2019-03-28 PROCEDURE — 36415 COLL VENOUS BLD VENIPUNCTURE: CPT | Performed by: FAMILY MEDICINE

## 2019-03-28 PROCEDURE — 80048 BASIC METABOLIC PNL TOTAL CA: CPT | Performed by: FAMILY MEDICINE

## 2019-03-28 PROCEDURE — 25000132 ZZH RX MED GY IP 250 OP 250 PS 637: Performed by: FAMILY MEDICINE

## 2019-03-28 PROCEDURE — 25800030 ZZH RX IP 258 OP 636: Performed by: EMERGENCY MEDICINE

## 2019-03-28 PROCEDURE — 96361 HYDRATE IV INFUSION ADD-ON: CPT

## 2019-03-28 RX ORDER — AMLODIPINE BESYLATE 2.5 MG/1
2.5 TABLET ORAL DAILY
Qty: 30 TABLET | Refills: 0 | Status: SHIPPED | OUTPATIENT
Start: 2019-03-28 | End: 2019-04-19

## 2019-03-28 RX ADMIN — ATENOLOL 50 MG: 25 TABLET ORAL at 08:34

## 2019-03-28 RX ADMIN — FLUOXETINE 10 MG: 10 CAPSULE ORAL at 08:34

## 2019-03-28 RX ADMIN — SODIUM CHLORIDE 1000 ML: 9 INJECTION, SOLUTION INTRAVENOUS at 06:59

## 2019-03-28 NOTE — PLAN OF CARE
S-(situation): Physical Therapy evaluation ordered    B-(background): Patient admitted from the ED with ear pain, dizziness, weakness and confusion.    A-(assessment): Per morning rounding and verbal provided to Rachel, OT patient has no skilled inpatient physical therapy needs.     R-(recommendations): Discharge inpatient orders.     Thank you for your referral.    Samantha Gregg, PT, DPT, ATC    [facility]    O: [phone]  E: [email or web address]

## 2019-03-28 NOTE — ASSESSMENT & PLAN NOTE
Controlled at home taking Tenormin and hydrochlorothiazide  Hold the HCTZ for now, continue Tenormin

## 2019-03-28 NOTE — PROGRESS NOTES
Patient has slept through the night outside of getting up to use the bathroom.  He is SBA with walker.  He has offered no complaints of pain, weakness, or dizziness.   Vitals are stable, but he remains hypertensive.    He offers no complaints at this time.

## 2019-03-28 NOTE — PROGRESS NOTES
SPIRITUAL HEALTH SERVICES  SPIRITUAL ASSESSMENT Progress Note  [facility]      During Rounding,  introduced himself to Tre Fischer & his daughter and informed them of his availability.    Christiano Angeles M.Div., [facility]  Staff   Office tel: [phone]

## 2019-03-28 NOTE — ASSESSMENT & PLAN NOTE
Presenting to the ER with headache on the left side of his head associated weakness and possibly some increased confusion.  Workup in the emergency department was for the most part unremarkable with a normal head CT, but his sodium was low at 125.  Has been noted to have history of hyponatremia in the past, currently taking hydrochlorothiazide  For now we will give IV saline and will repeat sodium in morning.  We will hold hydrochlorothiazide and if needing diuretic consider using Lasix instead.

## 2019-03-28 NOTE — DISCHARGE SUMMARY
[facility]  Hospitalist Discharge Summary       Date of Admission:  3/27/2019  Date of Discharge:  3/28/2019  Discharging Provider: Walter Jaramillo NP      Discharge Diagnoses   Principal Problem:    Hyponatremia  Active Problems:    Reflux esophagitis    Iron deficiency anemia    Essential hypertension with goal blood pressure less than 140/90    Generalized muscle weakness    Head pain        Follow-ups Needed After Discharge   Follow-up Appointments     Follow-up and recommended labs and tests       Follow up with primary care provider, Khoi Chacko, within 7 days to   evaluate medication change and for hospital follow- up.  No follow up labs   or test are needed.             Unresulted Labs Ordered in the Past 30 Days of this Admission     No orders found for last 61 day(s).          Discharge Disposition   Discharged to home  Condition at discharge: Stable    Hospital Course      Tre Fischer is a 88 year old male admitted on 3/27/2019. He presented to the emergency room complaining of pain behind his left ear.  Daughter who is with him stated that he has been weaker and possibly more confused than baseline. In the emergency department, patient was pleasantly confused with normal vital signs with lab work showing patient with low sodium at 125.  There was no sign of any infectious etiology going on and his head CT was normal.  Patient was registered observation and administered IV saline. Sodium recheck on the day of discharge was 129. Of note, patient has been taking hydrochlorothiazide and has chronically low sodium levels. Patient's mental status back to baseline on the day of discharge per family and patient ambulating in his room independently. Patient notes resolution of pain behind his left ear. Patient vitals are stable and he is medically stable for discharge today.     1) Hyponatremia  Assessment: Patient presented to the ER with headache on the left side of his  head associated weakness and possibly some increased confusion. Workup in the emergency department was for the most part unremarkable with a normal head CT, but his sodium was low at 125. Has been noted to have history of hyponatremia in the past, currently taking hydrochlorothiazide. Patient received IV saline overnight and repeat sodium was 129 on day of discharge. Patient's mental status back to baseline per his daughter and patient able to ambulate independently. Patient medically stable to discharge today.   Plan: Discontinued hydrochlorothiazide and added Norvasc 2.5 mg once daily for hypertension. Recommended follow up with PCP within 7 days. Patient discharged home with his daughter.     2) Generalized muscle weakness  Assessment: Patient presented to the ER with Increased weakness noted by daughter, possibly secondary to hyponatremia.  Plan: Patient able to ambulate independently on day of discharge and back to baseline functional level per his daughter. Likely due to hyponatremia.     3) Head pain  Assessment: Patient noted pain behind his left ear. Head CT and exam in ED both normal.   Plan: Resolved on day of discharge. No further acute intervention indicated.     4) Essential hypertension with goal blood pressure less than 140/90  Assessment: Controlled at home taking Tenormin and hydrochlorothiazide  Plan: Home dose of Tenormin was continued while patient was in the hospital while hydrochlorothiazide was held. Will discontinue hydrochlorothiazide due to hyponatremia. Added Norvasc 2.5 mg once daily and ordered home dose of Tenormin to be continued after discharge    5) Reflux esophagitis  Assessment: Chronic and stable on home dose of Prilosec. Patient denies any recent symptoms  Plan: Home dose of Prilosec ordered to resume after discharge.   Continue Prilosec      Consultations This Hospital Stay   PHYSICAL THERAPY ADULT IP CONSULT  OCCUPATIONAL THERAPY ADULT IP CONSULT    Code Status   DNR    Time  Spent on this Encounter   I, Walter Jaramillo, personally saw the patient today and spent greater than 30 minutes discharging this patient.       Walter Jaramillo NP  [facility]  ______________________________________________________________________    Physical Exam   Vital Signs: Temp: 98.4  F (36.9  C) Temp src: Oral BP: (!) 163/91 Pulse: 58   Resp: 16 SpO2: 97 % O2 Device: None (Room air)    Weight: 179 lbs 10.8 oz  Constitutional: awake, alert, cooperative, no apparent distress, and appears stated age  Respiratory: No increased work of breathing, good air exchange, clear to auscultation bilaterally, no crackles or wheezing  Cardiovascular: regular rate and rhythm, normal S1 and S2 and no murmur noted  GI: normal bowel sounds, non-distended and non-tender  Musculoskeletal: there is no redness, warmth, or swelling of the joints; full range of motion noted; tone is normal  Neurologic: Awake, alert, oriented to name, place and situation; Motor is 5 out of 5 bilaterally.         Primary Care Physician   Khoi Chacko    Immunizations       Discharge Orders      Reason for your hospital stay    You were in the hospital for weakness and confusion likely due to a low sodium level and improved.     Follow-up and recommended labs and tests     Follow up with primary care provider, Khoi Chacko, within 7 days to evaluate medication change and for hospital follow- up.  No follow up labs or test are needed.     Activity    Your activity upon discharge: activity as tolerated     Diet    Follow this diet upon discharge: Orders Placed This Encounter      Regular Diet Adult       Significant Results and Procedures   Most Recent 3 CBC's:  Recent Labs   Lab Test 03/27/19  1945 06/11/18  0850 08/11/17  0928   WBC 8.3 8.1 8.8   HGB 12.4* 12.7* 12.6*   MCV 86 87 89    179 164     Most Recent 3 BMP's:  Recent Labs   Lab Test 03/28/19  0603 03/27/19  1945 06/11/18  0850   * 125* 130*    POTASSIUM 3.8 3.5 3.9   CHLORIDE 94 88* 90*   CO2 28 28 29   BUN 14 16 18   CR 0.96 1.05 1.11   ANIONGAP 7 9 11   ROWAN 7.9* 8.1* 8.4*   GLC 84 101* 93       Discharge Medications   Current Discharge Medication List      START taking these medications    Details   amLODIPine (NORVASC) 2.5 MG tablet Take 1 tablet (2.5 mg) by mouth daily  Qty: 30 tablet, Refills: 0    Associated Diagnoses: Essential hypertension with goal blood pressure less than 140/90         CONTINUE these medications which have NOT CHANGED    Details   aspirin 81 MG EC tablet Take 81 mg by mouth daily Takes at night      atenolol (TENORMIN) 50 MG tablet TAKE ONE TABLET BY MOUTH TWICE DAILY  Qty: 180 tablet, Refills: 2    Associated Diagnoses: Essential hypertension with goal blood pressure less than 140/90      atorvastatin (LIPITOR) 20 MG tablet TAKE ONE TABLET BY MOUTH ONCE DAILY  Qty: 90 tablet, Refills: 2    Associated Diagnoses: Hyperlipidemia LDL goal <130      azelastine (ASTELIN) 0.1 % nasal spray SPRAY 2 SPRAYS INTO BOTH NOSTRILS 2 TIMES DAILY  Qty: 30 mL, Refills: 7    Associated Diagnoses: Chronic rhinitis      Cholecalciferol (VITAMIN D) 2000 UNITS tablet Take 2,000 Units by mouth daily  Qty: 100 tablet, Refills: 3    Associated Diagnoses: Osteoarthritis of shoulder region      diphenhydrAMINE-acetaminophen (TYLENOL PM EXTRA STRENGTH)  MG tablet Take 1 tablet by mouth nightly as needed.  Qty: 15 tablet, Refills: 0      FLUoxetine (PROZAC) 10 MG capsule TAKE ONE CAPSULE (10 MG) BY MOUTH DAILY  Qty: 30 capsule, Refills: 9    Associated Diagnoses: Anxiety      ketoconazole (NIZORAL) 2 % cream APPLY TOPICALLY 2 TIMES DAILY  Qty: 30 g, Refills: 3    Associated Diagnoses: Dermatophytosis of nail      omeprazole (PRILOSEC) 20 MG CR capsule TAKE 1 CAPSULE (20 MG) BY MOUTH 2 TIMES DAILY  Qty: 180 capsule, Refills: 3    Associated Diagnoses: H. pylori infection      tamsulosin (FLOMAX) 0.4 MG capsule TAKE ONE CAPSULE BY MOUTH ONCE DAILY AS  NEEDED  Qty: 90 capsule, Refills: 2    Associated Diagnoses: Prostate hypertrophy         STOP taking these medications       hydrochlorothiazide (HYDRODIURIL) 25 MG tablet Comments:   Reason for Stopping:             Allergies   Allergies   Allergen Reactions     No Known Drug Allergies

## 2019-03-28 NOTE — ED PROVIDER NOTES
History     Chief Complaint   Patient presents with     Headache     HPI  Tre Fischer is a 88 year old male who presents with a pain behind his left ear.  This began this morning.  It is intermittent, sharp and shooting.  It lasts for couple seconds when it occurs.  There is been no trauma to the area.  There is been no global headache.  There is been no fever.  No neurological deficit.  He presents with his daughter who states he has had some more trouble with memory and been somewhat weaker lately.  He lives with his wife.    Allergies:  Allergies   Allergen Reactions     No Known Drug Allergies        Problem List:    Patient Active Problem List    Diagnosis Date Noted     Nutritional deficiency 08/11/2017     Priority: Medium     Anxiety 11/22/2016     Priority: Medium     Chronic rhinitis 01/06/2016     Priority: Medium     Chronic left shoulder pain 01/06/2016     Priority: Medium     Reflux esophagitis 01/06/2016     Priority: Medium     Iron deficiency anemia 01/06/2016     Priority: Medium     History  of basal cell carcinoma 01/10/2013     Priority: Medium     IMO update changed this record. Please review for accuracy       Advance care planning 11/04/2011     Priority: Medium     Advance Care Planning 12/12/2016: Receipt of ACP document:  Received: POLST which was signed and dated by provider on 9/21/16.  Document previously scanned on 10/14/16.  Order reviewed and found to be valid.  Code Status needs to be updated to reflect choices in most recent ACP document: DNR.  Confirmed/documented designated decision maker(s).  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     Priority: Medium        Past Medical History:    Past Medical History:   Diagnosis Date     Basal cell carcinoma 09/2012     Basal cell carcinoma 9/19/2012     Hypertension      Osteoarthrosis, unspecified whether generalized or localized, unspecified site      Pure hypercholesterolemia      URTICARIA NOS 12/4/2001       Past Surgical History:    Past Surgical History:   Procedure Laterality Date     C NONSPECIFIC PROCEDURE  1980's    Hernia      C NONSPECIFIC PROCEDURE  10/2012    mass removal on scalp - melonoma     CATARACT IOL, RT/LT  7/15    Rt and Lt     COLONOSCOPY N/A 10/11/2016    Procedure: COLONOSCOPY;  Surgeon: Julia Bhatti MD;  Location: [facility]     ESOPHAGOSCOPY, GASTROSCOPY, DUODENOSCOPY (EGD), COMBINED N/A 10/11/2016    Procedure: COMBINED ESOPHAGOSCOPY, GASTROSCOPY, DUODENOSCOPY (EGD), BIOPSY SINGLE OR MULTIPLE;  Surgeon: Julia Bhatti MD;  Location: [facility]     HC COLONOSCOPY W/WO BRUSH/WASH  07/26/05    diverticolosis Repeat in 5yrs     HC REMOVAL OF LEG VEINS/ULCER  1996    left     HC REPAIR ROTATOR CUFF,ACUTE  09/16/2002    Rotator cuff repair, right shoulder, with anterior decompression.     HC REVISE MEDIAN N/CARPAL TUNNEL SURG  04/19/10     LAPAROSCOPIC CHOLECYSTECTOMY N/A 10/14/2016    Procedure: LAPAROSCOPIC CHOLECYSTECTOMY;  Surgeon: Julia Bhatti MD;  Location: [facility]       Family History:    Family History   Problem Relation Age of Onset     Diabetes Mother         Adult Onset     Cancer Sister      Diabetes Sister      Hypertension Sister      Cerebrovascular Disease Sister      Heart Disease Brother         valve replacement       Social History:  Marital Status:   [2]  Social History     Tobacco Use     Smoking status: Former Smoker     Packs/day: 1.00     Years: 10.00     Pack years: 10.00     Types:  Cigarettes     Last attempt to quit: 1972     Years since quittin.2     Smokeless tobacco: Never Used   Substance Use Topics     Alcohol use: No     Alcohol/week: 0.0 oz     Drug use: No        Medications:      atenolol (TENORMIN) 50 MG tablet   atorvastatin (LIPITOR) 20 MG tablet   azelastine (ASTELIN) 0.1 % nasal spray   benzonatate (TESSALON) 100 MG capsule   cetirizine (ZYRTEC) 10 MG tablet   Cholecalciferol (VITAMIN D) 2000 UNITS tablet   clarithromycin (BIAXIN) 500 MG tablet   diphenhydrAMINE-acetaminophen (TYLENOL PM EXTRA STRENGTH)  MG tablet   FLUoxetine (PROZAC) 10 MG capsule   hydrochlorothiazide (HYDRODIURIL) 25 MG tablet   ketoconazole (NIZORAL) 2 % cream   LORazepam (ATIVAN) 0.5 MG tablet   MULTIPLE VITAMINS-MINERALS OR CAPS   Omega-3 Fatty Acids (FISH OIL) 500 MG CAPS   omeprazole (PRILOSEC) 20 MG CR capsule   predniSONE (DELTASONE) 20 MG tablet   tamsulosin (FLOMAX) 0.4 MG capsule   VITAMIN C 1000 MG OR TABS         Review of Systems   Daughter also reports increasing fatigue and generalized weakness.  All other systems are reviewed and are negative    Physical Exam   BP: (!) 187/96  Pulse: 56  Temp: 97.3  F (36.3  C)  Resp: 20  SpO2: 99 %      Physical Exam   Constitutional: He appears well-developed and well-nourished. No distress.   HENT:   Head: Normocephalic and atraumatic.   Right Ear: Tympanic membrane normal.   Left Ear: Tympanic membrane normal.   Mouth/Throat: Oropharynx is clear and moist.   Approximately 2 cm posterior to the middle aspect of the left ear there is a small area of tenderness.  There is no area of swelling, fluctuance, ecchymosis or other irregularity visible or palpable in this region.   Eyes: Conjunctivae are normal. Right eye exhibits no discharge. Left eye exhibits no discharge. No scleral icterus.   Neck: Normal range of motion. Neck supple.   Cardiovascular: Normal rate, regular rhythm and normal heart sounds.   No murmur heard.  Pulmonary/Chest:  Effort normal and breath sounds normal. No stridor. No respiratory distress.   Abdominal: Soft. There is no tenderness.   Musculoskeletal: Normal range of motion.   Neurological: He is alert. He has normal strength. No cranial nerve deficit or sensory deficit. He exhibits normal muscle tone. GCS eye subscore is 4. GCS verbal subscore is 5. GCS motor subscore is 6.   Skin: Skin is warm and dry. No rash noted. He is not diaphoretic. No erythema. No pallor.   Psychiatric: He has a normal mood and affect.   Nursing note and vitals reviewed.      ED Course        Procedures               Critical Care time:  none               Results for orders placed or performed during the hospital encounter of 03/27/19 (from the past 24 hour(s))   CBC with platelets differential   Result Value Ref Range    WBC 8.3 4.0 - 11.0 10e9/L    RBC Count 4.06 (L) 4.4 - 5.9 10e12/L    Hemoglobin 12.4 (L) 13.3 - 17.7 g/dL    Hematocrit 34.7 (L) 40.0 - 53.0 %    MCV 86 78 - 100 fl    MCH 30.5 26.5 - 33.0 pg    MCHC 35.7 31.5 - 36.5 g/dL    RDW 12.8 10.0 - 15.0 %    Platelet Count 156 150 - 450 10e9/L    Diff Method Automated Method     % Neutrophils 67.7 %    % Lymphocytes 22.8 %    % Monocytes 7.4 %    % Eosinophils 1.0 %    % Basophils 0.5 %    % Immature Granulocytes 0.6 %    Nucleated RBCs 0 0 /100    Absolute Neutrophil 5.6 1.6 - 8.3 10e9/L    Absolute Lymphocytes 1.9 0.8 - 5.3 10e9/L    Absolute Monocytes 0.6 0.0 - 1.3 10e9/L    Absolute Basophils 0.0 0.0 - 0.2 10e9/L    Abs Immature Granulocytes 0.1 0 - 0.4 10e9/L    Absolute Nucleated RBC 0.0    Comprehensive metabolic panel   Result Value Ref Range    Sodium 125 (L) 133 - 144 mmol/L    Potassium 3.5 3.4 - 5.3 mmol/L    Chloride 88 (L) 94 - 109 mmol/L    Carbon Dioxide 28 20 - 32 mmol/L    Anion Gap 9 3 - 14 mmol/L    Glucose 101 (H) 70 - 99 mg/dL    Urea Nitrogen 16 7 - 30 mg/dL    Creatinine 1.05 0.66 - 1.25 mg/dL    GFR Estimate 63 >60 mL/min/[1.73_m2]    GFR Estimate If Black 73 >60  mL/min/[1.73_m2]    Calcium 8.1 (L) 8.5 - 10.1 mg/dL    Bilirubin Total 0.5 0.2 - 1.3 mg/dL    Albumin 3.5 3.4 - 5.0 g/dL    Protein Total 6.1 (L) 6.8 - 8.8 g/dL    Alkaline Phosphatase 134 40 - 150 U/L    ALT 23 0 - 70 U/L    AST 20 0 - 45 U/L   UA with Microscopic   Result Value Ref Range    Color Urine Yellow     Appearance Urine Clear     Glucose Urine Negative NEG^Negative mg/dL    Bilirubin Urine Negative NEG^Negative    Ketones Urine Negative NEG^Negative mg/dL    Specific Gravity Urine 1.014 1.003 - 1.035    Blood Urine Negative NEG^Negative    pH Urine 6.0 5.0 - 7.0 pH    Protein Albumin Urine Negative NEG^Negative mg/dL    Urobilinogen mg/dL 2.0 0.0 - 2.0 mg/dL    Nitrite Urine Negative NEG^Negative    Leukocyte Esterase Urine Negative NEG^Negative    Source Midstream Urine     WBC Urine 1 0 - 5 /HPF    RBC Urine <1 0 - 2 /HPF    Squamous Epithelial /HPF Urine <1 0 - 1 /HPF    Mucous Urine Present (A) NEG^Negative /LPF    Hyaline Casts 4 (H) 0 - 2 /LPF   CT Head w/o Contrast    Narrative    CT SCAN OF THE HEAD WITHOUT CONTRAST   3/27/2019 8:07 PM     HISTORY: Headache.    TECHNIQUE: Axial images of the head and coronal reformations without  IV contrast material. Radiation dose for this scan was reduced using  automated exposure control, adjustment of the mA and/or kV according  to patient size, or iterative reconstruction technique.    COMPARISON: MRI 6/10/2013    FINDINGS: Mild to moderate volume loss is present. Patchy white matter  hypoattenuation likely represents chronic small vessel ischemic  change. Scattered vascular calcifications are present, most  conspicuous at the vertebral artery V4 segments and carotid siphons.  No evidence of acute ischemia, hemorrhage, mass, mass effect, or  hydrocephalus. The visualized calvarium, skull base, paranasal  sinuses, and extracranial soft tissues are unremarkable. Bilateral  lens replacements are present. Supraorbital 2 mm preseptal soft tissue  calcification  is present.      Impression    IMPRESSION: No acute intracranial abnormality.    SARAH CONNER MD       Medications - No data to display    Assessments & Plan (with Medical Decision Making)  88-year-old male with hyponatremia, fatigue and weakness.  Will bring in for IV fluids with the hospitalist service.  Also with some nonspecific stabbing pain behind his left ear.  No significant finding on CT or exam.     I have reviewed the nursing notes.    I have reviewed the findings, diagnosis, plan and need for follow up with the patient.          Medication List      There are no discharge medications for this visit.         Final diagnoses:   Hyponatremia   Other fatigue       3/27/2019   [facility] EMERGENCY DEPARTMENT     Adrian Pham MD  03/27/19 [id number]

## 2019-03-28 NOTE — PROGRESS NOTES
S-(situation): Patient registered to Observation. Patient arrived to room 215 via cart from ED    B-(background): Patient with sudden sharp pain behind left ear, dizziness.  Incidental finding of low Na+    A-(assessment): Bradycardic, Hypertensive.  Denies pain.  Denies dizziness.  Ambulates well with walker.    R-(recommendations): Orders and observation goals reviewed with patient and daughter Debora.    Nursing Observation criteria listed below was met:    Skin issues/needs documented:NA  Isolation needs addressed, if appropriate: NA  Fall Prevention: Education given and documented: Yes  Education Assessment documented:Yes  Education Documented (Pre-existing chronic infection such as, MRSA/VRE need education on admission): Yes  OBS video/handout Reviewed & Documented Yes, video watched in ED.  Daughter and patient state understanding.   Medication Reconciliation Complete: Yes  New medication patient education completed and documented (Possible Side Effects of Common Medications handout): Yes  Home medications if not able to send immediately home with family stored here: NA  Reminder note placed in discharge instructions: NA  Patient has discharge needs (If yes, please explain): Not at this time.

## 2019-03-28 NOTE — H&P
[facility]    History and Physical - Hospitalist Service       Date of Admission:  3/27/2019    Assessment & Plan   Tre Fischer is a 88 year old male admitted on 3/27/2019. He resents to the emergency room complaining of pain behind his left ear.  Daughter who is with him states that he has been weaker and possibly more confused than baseline.  History of falls or injuries.  In the emergency department he was pleasantly confused with normal vital signs with lab work showing a a sodium low at 125.  There is no sign of any infectious etiology going on and his head CT was normal.  The plan this evening was to registered observation, administer IV saline and recheck sodium tomorrow.  We will hold his hydrochlorothiazide and evaluate tomorrow with PT/OT to determine if safe to return home.    Hyponatremia  Presenting to the ER with headache on the left side of his head associated weakness and possibly some increased confusion.  Workup in the emergency department was for the most part unremarkable with a normal head CT, but his sodium was low at 125.  Has been noted to have history of hyponatremia in the past, currently taking hydrochlorothiazide  For now we will give IV saline and will repeat sodium in morning.  We will hold hydrochlorothiazide and if needing diuretic consider using Lasix instead.    Generalized muscle weakness  Increased weakness noted by daughter, possibly secondary to hyponatremia  Monitor, will have PT/OT evaluation tomorrow    Head pain  Complaint of pain behind his left ear with normal exam and normal head CT  Monitor, Tylenol ordered for discomfort    Essential hypertension with goal blood pressure less than 140/90  Controlled at home taking Tenormin and hydrochlorothiazide  Hold the HCTZ for now, continue Tenormin    Reflux esophagitis  Asymptomatic, taking Prilosec  Continue Prilosec    Iron deficiency anemia  Noted in past, currently normal hemoglobin at  12.4  Continue iron replacement         Diet: Regular Diet Adult    DVT Prophylaxis: Observation status  [place] Catheter: in place, indication:    Code Status: Full Code      Disposition Plan   Expected discharge: Tomorrow, recommended to prior living arrangement once improved sodium, determined safe to go home by PT/OT.  Entered: Austen Conway MD 03/27/2019, 11:16 PM     The patient's care was discussed with the Patient and Patient's Family.    Austen Conway MD  [facility]    ______________________________________________________________________    Chief Complaint   88-year-old male who presents with pain behind his left ear and weakness    History is obtained from the patient, electronic health record, emergency department physician and patient's daughter    History of Present Illness   Tre Fischer is a 88 year old male who presents to the hospital with concerns of pain behind his left ear.  Started this morning, describes a sharp, intermittent pain without radiation.  Daughter states he has been weaker and sitting around more than usual and has had increasing effusion over the past month but seems acutely worse today.  There is been no fever, chills, cough, other signs of infectious etiology.  He states that he has been urinating increasing amounts and does not drink very much in terms of fluids.  He when asked about symptoms tends to push back to his daughter to answer the questions and it seems that he probably has some underlying dementia.    Review of Systems    The 10 point Review of Systems is negative other than noted in the HPI or here.     Past Medical History    I have reviewed this patient's medical history and updated it with pertinent information if needed.   Past Medical History:   Diagnosis Date     Basal cell carcinoma 09/2012    L scalp     Basal cell carcinoma 9/19/2012     Hypertension      Osteoarthrosis, unspecified whether generalized or  localized, unspecified site      Pure hypercholesterolemia      URTICARIA NOS 2001       Past Surgical History   I have reviewed this patient's surgical history and updated it with pertinent information if needed.  Past Surgical History:   Procedure Laterality Date     C NONSPECIFIC PROCEDURE      Hernia      C NONSPECIFIC PROCEDURE  10/2012    mass removal on scalp - melonoma     CATARACT IOL, RT/LT  7/15    Rt and Lt     COLONOSCOPY N/A 10/11/2016    Procedure: COLONOSCOPY;  Surgeon: Julia Bhatti MD;  Location: [facility]     ESOPHAGOSCOPY, GASTROSCOPY, DUODENOSCOPY (EGD), COMBINED N/A 10/11/2016    Procedure: COMBINED ESOPHAGOSCOPY, GASTROSCOPY, DUODENOSCOPY (EGD), BIOPSY SINGLE OR MULTIPLE;  Surgeon: Julia Bhatti MD;  Location: [facility]     HC COLONOSCOPY W/WO BRUSH/WASH  05    diverticolosis Repeat in 5yrs     HC REMOVAL OF LEG VEINS/ULCER      left     HC REPAIR ROTATOR CUFF,ACUTE  2002    Rotator cuff repair, right shoulder, with anterior decompression.     HC REVISE MEDIAN N/CARPAL TUNNEL SURG  04/19/10     LAPAROSCOPIC CHOLECYSTECTOMY N/A 10/14/2016    Procedure: LAPAROSCOPIC CHOLECYSTECTOMY;  Surgeon: Julia Bhatti MD;  Location: [facility]       Social History   I have reviewed this patient's social history and updated it with pertinent information if needed.  Social History     Tobacco Use     Smoking status: Former Smoker     Packs/day: 1.00     Years: 10.00     Pack years: 10.00     Types: Cigarettes     Last attempt to quit: 1972     Years since quittin.2     Smokeless tobacco: Never Used   Substance Use Topics     Alcohol use: No     Alcohol/week: 0.0 oz     Drug use: No       Family History   I have reviewed this patient's family history and updated it with pertinent information if needed.   Family History   Problem Relation Age of Onset     Diabetes Mother         Adult Onset     Cancer Sister      Diabetes Sister      Hypertension Sister       Cerebrovascular Disease Sister      Heart Disease Brother         valve replacement       Prior to Admission Medications   Prior to Admission Medications   Prescriptions Last Dose Informant Patient Reported? Taking?   Cholecalciferol (VITAMIN D) 2000 UNITS tablet 3/27/2019 at 0700  No Yes   Sig: Take 2,000 Units by mouth daily   FLUoxetine (PROZAC) 10 MG capsule 3/27/2019 at 0700  No Yes   Sig: TAKE ONE CAPSULE (10 MG) BY MOUTH DAILY   aspirin 81 MG EC tablet 3/26/2019 at 1900  Yes Yes   Sig: Take 81 mg by mouth daily Takes at night   atenolol (TENORMIN) 50 MG tablet 3/27/2019 at 0500  No Yes   Sig: TAKE ONE TABLET BY MOUTH TWICE DAILY   atorvastatin (LIPITOR) 20 MG tablet 3/26/2019 at 1900  No Yes   Sig: TAKE ONE TABLET BY MOUTH ONCE DAILY   azelastine (ASTELIN) 0.1 % nasal spray Past Week at Unknown time  No Yes   Sig: SPRAY 2 SPRAYS INTO BOTH NOSTRILS 2 TIMES DAILY   diphenhydrAMINE-acetaminophen (TYLENOL PM EXTRA STRENGTH)  MG tablet Past Week at Unknown time  Yes Yes   Sig: Take 1 tablet by mouth nightly as needed.   hydrochlorothiazide (HYDRODIURIL) 25 MG tablet 3/27/2019 at 0700  No Yes   Sig: TAKE 1 TABLET (25 MG) BY MOUTH DAILY   ketoconazole (NIZORAL) 2 % cream Past Week at Unknown time  No Yes   Sig: APPLY TOPICALLY 2 TIMES DAILY   omeprazole (PRILOSEC) 20 MG CR capsule 3/27/2019 at 0700  No Yes   Sig: TAKE 1 CAPSULE (20 MG) BY MOUTH 2 TIMES DAILY   tamsulosin (FLOMAX) 0.4 MG capsule 3/27/2019 at 0700  No Yes   Sig: TAKE ONE CAPSULE BY MOUTH ONCE DAILY AS NEEDED      Facility-Administered Medications: None     Allergies   Allergies   Allergen Reactions     No Known Drug Allergies        Physical Exam   Vital Signs: Temp: 97.3  F (36.3  C) Temp src: Oral BP: (!) 165/102 Pulse: 52   Resp: 18 SpO2: 99 % O2 Device: None (Room air)    Weight: 0 lbs 0 oz    Constitutional: awake, alert, cooperative, no apparent distress, and appears stated age  Eyes: Lids and lashes normal, pupils equal, round and  reactive to light, extra ocular muscles intact, sclera clear, conjunctiva normal  ENT: Normocephalic, without obvious abnormality, atraumatic, sinuses nontender on palpation, external ears without lesions, oral pharynx with moist mucous membranes, tonsils without erythema or exudates, gums normal and good dentition.  Hematologic / Lymphatic: no cervical lymphadenopathy and no supraclavicular lymphadenopathy  Respiratory: No increased work of breathing, good air exchange, clear to auscultation bilaterally, no crackles or wheezing  Cardiovascular: regular rate and rhythm  GI: normal bowel sounds, soft, non-distended and non-tender  Skin: no bruising or bleeding, normal skin color, texture, turgor and no redness, warmth, or swelling  Musculoskeletal: There is no redness, warmth, or swelling of the joints.  Full range of motion noted.  Motor strength is 5 out of 5 all extremities bilaterally.  Tone is normal.  Neurologic: Awake, alert, oriented to name, place   Cranial nerves II-XII are grossly intact.  Motor is 5 out of 5 bilaterally.  Data   Data reviewed today: I reviewed all medications, new labs and imaging results over the last 24 hours. I personally reviewed the head CT image(s) showing no signs of stroke or bleed.    Results for orders placed or performed during the hospital encounter of 03/27/19   CT Head w/o Contrast    Narrative    CT SCAN OF THE HEAD WITHOUT CONTRAST   3/27/2019 8:07 PM     HISTORY: Headache.    TECHNIQUE: Axial images of the head and coronal reformations without  IV contrast material. Radiation dose for this scan was reduced using  automated exposure control, adjustment of the mA and/or kV according  to patient size, or iterative reconstruction technique.    COMPARISON: MRI 6/10/2013    FINDINGS: Mild to moderate volume loss is present. Patchy white matter  hypoattenuation likely represents chronic small vessel ischemic  change. Scattered vascular calcifications are present,  most  conspicuous at the vertebral artery V4 segments and carotid siphons.  No evidence of acute ischemia, hemorrhage, mass, mass effect, or  hydrocephalus. The visualized calvarium, skull base, paranasal  sinuses, and extracranial soft tissues are unremarkable. Bilateral  lens replacements are present. Supraorbital 2 mm preseptal soft tissue  calcification is present.      Impression    IMPRESSION: No acute intracranial abnormality.    SARAH CONNER MD   CBC with platelets differential   Result Value Ref Range    WBC 8.3 4.0 - 11.0 10e9/L    RBC Count 4.06 (L) 4.4 - 5.9 10e12/L    Hemoglobin 12.4 (L) 13.3 - 17.7 g/dL    Hematocrit 34.7 (L) 40.0 - 53.0 %    MCV 86 78 - 100 fl    MCH 30.5 26.5 - 33.0 pg    MCHC 35.7 31.5 - 36.5 g/dL    RDW 12.8 10.0 - 15.0 %    Platelet Count 156 150 - 450 10e9/L    Diff Method Automated Method     % Neutrophils 67.7 %    % Lymphocytes 22.8 %    % Monocytes 7.4 %    % Eosinophils 1.0 %    % Basophils 0.5 %    % Immature Granulocytes 0.6 %    Nucleated RBCs 0 0 /100    Absolute Neutrophil 5.6 1.6 - 8.3 10e9/L    Absolute Lymphocytes 1.9 0.8 - 5.3 10e9/L    Absolute Monocytes 0.6 0.0 - 1.3 10e9/L    Absolute Basophils 0.0 0.0 - 0.2 10e9/L    Abs Immature Granulocytes 0.1 0 - 0.4 10e9/L    Absolute Nucleated RBC 0.0    Comprehensive metabolic panel   Result Value Ref Range    Sodium 125 (L) 133 - 144 mmol/L    Potassium 3.5 3.4 - 5.3 mmol/L    Chloride 88 (L) 94 - 109 mmol/L    Carbon Dioxide 28 20 - 32 mmol/L    Anion Gap 9 3 - 14 mmol/L    Glucose 101 (H) 70 - 99 mg/dL    Urea Nitrogen 16 7 - 30 mg/dL    Creatinine 1.05 0.66 - 1.25 mg/dL    GFR Estimate 63 >60 mL/min/[1.73_m2]    GFR Estimate If Black 73 >60 mL/min/[1.73_m2]    Calcium 8.1 (L) 8.5 - 10.1 mg/dL    Bilirubin Total 0.5 0.2 - 1.3 mg/dL    Albumin 3.5 3.4 - 5.0 g/dL    Protein Total 6.1 (L) 6.8 - 8.8 g/dL    Alkaline Phosphatase 134 40 - 150 U/L    ALT 23 0 - 70 U/L    AST 20 0 - 45 U/L   UA with Microscopic   Result  Value Ref Range    Color Urine Yellow     Appearance Urine Clear     Glucose Urine Negative NEG^Negative mg/dL    Bilirubin Urine Negative NEG^Negative    Ketones Urine Negative NEG^Negative mg/dL    Specific Gravity Urine 1.014 1.003 - 1.035    Blood Urine Negative NEG^Negative    pH Urine 6.0 5.0 - 7.0 pH    Protein Albumin Urine Negative NEG^Negative mg/dL    Urobilinogen mg/dL 2.0 0.0 - 2.0 mg/dL    Nitrite Urine Negative NEG^Negative    Leukocyte Esterase Urine Negative NEG^Negative    Source Midstream Urine     WBC Urine 1 0 - 5 /HPF    RBC Urine <1 0 - 2 /HPF    Squamous Epithelial /HPF Urine <1 0 - 1 /HPF    Mucous Urine Present (A) NEG^Negative /LPF    Hyaline Casts 4 (H) 0 - 2 /LPF

## 2019-03-28 NOTE — PLAN OF CARE
S-(situation): OT consult    B-(background): Patient arrive to ED with pain behind left ear and family reporting some confusion.     A-(assessment): MD reports patient's sodium levels stabilized with potential discharge this am and no need for OT/PT consult at this time.     R-(recommendations): OT order completed     DELILAH Wadsworth/L  [facility]  [phone]

## 2019-03-28 NOTE — ASSESSMENT & PLAN NOTE
Complaint of pain behind his left ear with normal exam and normal head CT  Monitor, Tylenol ordered for discomfort

## 2019-03-29 ENCOUNTER — OFFICE VISIT (OUTPATIENT)
Dept: FAMILY MEDICINE | Facility: CLINIC | Age: 84
End: 2019-03-29
Payer: COMMERCIAL

## 2019-03-29 VITALS
RESPIRATION RATE: 18 BRPM | WEIGHT: 183.1 LBS | BODY MASS INDEX: 27.84 KG/M2 | OXYGEN SATURATION: 96 % | HEART RATE: 68 BPM | SYSTOLIC BLOOD PRESSURE: 142 MMHG | DIASTOLIC BLOOD PRESSURE: 64 MMHG | TEMPERATURE: 97.4 F

## 2019-03-29 DIAGNOSIS — E87.1 HYPONATREMIA: Primary | ICD-10-CM

## 2019-03-29 DIAGNOSIS — N40.0 PROSTATE HYPERTROPHY: ICD-10-CM

## 2019-03-29 DIAGNOSIS — Z12.5 SPECIAL SCREENING FOR MALIGNANT NEOPLASM OF PROSTATE: ICD-10-CM

## 2019-03-29 DIAGNOSIS — I10 ESSENTIAL HYPERTENSION WITH GOAL BLOOD PRESSURE LESS THAN 140/90: ICD-10-CM

## 2019-03-29 DIAGNOSIS — E63.9 NUTRITIONAL DEFICIENCY: ICD-10-CM

## 2019-03-29 DIAGNOSIS — N39.0 RECURRENT UTI (URINARY TRACT INFECTION): ICD-10-CM

## 2019-03-29 DIAGNOSIS — D50.8 IRON DEFICIENCY ANEMIA SECONDARY TO INADEQUATE DIETARY IRON INTAKE: ICD-10-CM

## 2019-03-29 LAB
ALBUMIN SERPL-MCNC: 3.5 G/DL (ref 3.4–5)
ALBUMIN UR-MCNC: NEGATIVE MG/DL
ALP SERPL-CCNC: 133 U/L (ref 40–150)
ALT SERPL W P-5'-P-CCNC: 25 U/L (ref 0–70)
ANION GAP SERPL CALCULATED.3IONS-SCNC: 8 MMOL/L (ref 3–14)
APPEARANCE UR: CLEAR
AST SERPL W P-5'-P-CCNC: 23 U/L (ref 0–45)
BILIRUB SERPL-MCNC: 0.6 MG/DL (ref 0.2–1.3)
BILIRUB UR QL STRIP: NEGATIVE
BUN SERPL-MCNC: 16 MG/DL (ref 7–30)
CALCIUM SERPL-MCNC: 8.3 MG/DL (ref 8.5–10.1)
CHLORIDE SERPL-SCNC: 93 MMOL/L (ref 94–109)
CO2 SERPL-SCNC: 29 MMOL/L (ref 20–32)
COLOR UR AUTO: NORMAL
CREAT SERPL-MCNC: 0.97 MG/DL (ref 0.66–1.25)
ERYTHROCYTE [DISTWIDTH] IN BLOOD BY AUTOMATED COUNT: 12.9 % (ref 10–15)
GFR SERPL CREATININE-BSD FRML MDRD: 69 ML/MIN/{1.73_M2}
GLUCOSE SERPL-MCNC: 119 MG/DL (ref 70–99)
GLUCOSE UR STRIP-MCNC: NEGATIVE MG/DL
HCT VFR BLD AUTO: 36.4 % (ref 40–53)
HGB BLD-MCNC: 12.6 G/DL (ref 13.3–17.7)
HGB UR QL STRIP: NEGATIVE
KETONES UR STRIP-MCNC: NEGATIVE MG/DL
LEUKOCYTE ESTERASE UR QL STRIP: NEGATIVE
MCH RBC QN AUTO: 30.5 PG (ref 26.5–33)
MCHC RBC AUTO-ENTMCNC: 34.6 G/DL (ref 31.5–36.5)
MCV RBC AUTO: 88 FL (ref 78–100)
NITRATE UR QL: NEGATIVE
PH UR STRIP: 7 PH (ref 5–7)
PLATELET # BLD AUTO: 165 10E9/L (ref 150–450)
POTASSIUM SERPL-SCNC: 3.9 MMOL/L (ref 3.4–5.3)
PROT SERPL-MCNC: 6.2 G/DL (ref 6.8–8.8)
PSA SERPL-ACNC: 1.73 UG/L (ref 0–4)
RBC # BLD AUTO: 4.13 10E12/L (ref 4.4–5.9)
SODIUM SERPL-SCNC: 130 MMOL/L (ref 133–144)
SOURCE: NORMAL
SP GR UR STRIP: 1.01 (ref 1–1.03)
UROBILINOGEN UR STRIP-MCNC: 0 MG/DL (ref 0–2)
WBC # BLD AUTO: 8.7 10E9/L (ref 4–11)

## 2019-03-29 PROCEDURE — 99496 TRANSJ CARE MGMT HIGH F2F 7D: CPT | Performed by: FAMILY MEDICINE

## 2019-03-29 PROCEDURE — 36415 COLL VENOUS BLD VENIPUNCTURE: CPT | Performed by: FAMILY MEDICINE

## 2019-03-29 PROCEDURE — 85027 COMPLETE CBC AUTOMATED: CPT | Performed by: FAMILY MEDICINE

## 2019-03-29 PROCEDURE — 80053 COMPREHEN METABOLIC PANEL: CPT | Performed by: FAMILY MEDICINE

## 2019-03-29 PROCEDURE — 81003 URINALYSIS AUTO W/O SCOPE: CPT | Performed by: FAMILY MEDICINE

## 2019-03-29 PROCEDURE — G0103 PSA SCREENING: HCPCS | Performed by: FAMILY MEDICINE

## 2019-03-29 ASSESSMENT — PAIN SCALES - GENERAL: PAINLEVEL: MILD PAIN (3)

## 2019-03-29 NOTE — PATIENT INSTRUCTIONS
Trial off tamsulosin.  Is he less tired, does he pee more.  If the first is better and second is worse, I may try another different  med. Should know in a week or so.   We should recheck sodium again by the end of second week.   Ask to have blood pressure checked or have home care check it also.

## 2019-03-29 NOTE — PROGRESS NOTES
SUBJECTIVE:   Tre Fischer is a 88 year old male who presents to clinic today for the following health issues:        Hospital Follow-up Visit:    Hospital/Nursing Home/IP Rehab Facility: [facility]  Date of Admission: 3/27/2019  Date of Discharge: 3/28/2019  Reason(s) for Admission: HTN and Hyponatremia            Problems taking medications regularly:  None       Medication changes since discharge: taken off hctz       Problems adhering to non-medication therapy:  None    Summary of hospitalization:  [facility] discharge summary reviewed  Diagnostic Tests/Treatments reviewed.  Follow up needed: Visits in blood work eventually  Other Healthcare Providers Involved in Patient s Care:         Homecare  Update since discharge: improved.     Post Discharge Medication Reconciliation: discharge medications reconciled and changed, per note/orders (see AVS).  Plan of care communicated with patient, family and including son and daughter     Coding guidelines for this visit:  Type of Medical   Decision Making Face-to-Face Visit       within 7 Days of discharge Face-to-Face Visit        within 14 days of discharge   Moderate Complexity [id number]   High Complexity [id number]              Even before this started, patient has been receiving much at home care assistance.  He and his wife have been failing and there is discussion to place him in a nursing home.  He is aware of this discussion and feels it may be helpful to him.  He is often angry and frustrated with his wife.  His great grandkids also frustrate him.  He prefers to be left alone.  He was hospitalized for low sodium with pain behind his left ear and his neck along with confusion and poor functioning.  So far with change of medications including the addition of amlodipine he is doing reasonably well.  He does state that he is urinating frequently and often.  He gets up at night.  He is tired and sleeps much of the day.  Neither he nor  his family can tell me if tamsulosin is done much to prevent frequent urination.  He continues to have some nasal congestion but Astelin nasal spray has been helpful.  There was some concern in the hospital regarding his slow pulse.  This is not been noticed at home.  He does not eat a lot and we have known he has had nutritional deficiency for some time.    Problem list and histories reviewed & adjusted, as indicated.  Additional history: as documented    BP Readings from Last 3 Encounters:   03/29/19 142/64   03/28/19 (!) 163/91   06/11/18 112/68    Wt Readings from Last 3 Encounters:   03/29/19 83.1 kg (183 lb 1.6 oz)   03/27/19 81.5 kg (179 lb 10.8 oz)   06/11/18 80.1 kg (176 lb 8 oz)                  Labs reviewed in EPIC    Reviewed and updated as needed this visit by clinical staff       Reviewed and updated as needed this visit by Provider         ROS:  Constitutional, HEENT, cardiovascular, pulmonary, gi and gu systems are negative, except as otherwise noted.    OBJECTIVE:     /64   Pulse 68   Temp 97.4  F (36.3  C) (Temporal)   Resp 18   Wt 83.1 kg (183 lb 1.6 oz)   SpO2 96%   BMI 27.84 kg/m    Body mass index is 27.84 kg/m .  GENERAL: healthy, alert and no distress  EYES: Eyes grossly normal to inspection  HENT: Ears and oral cavity were negative.  No obvious nasal discharge today  NECK: no adenopathy, no asymmetry, masses, or scars and thyroid normal to palpation  RESP: lungs clear to auscultation - no rales, rhonchi or wheezes  CV: Regular no murmur present  ABDOMEN: soft, nontender, no hepatosplenomegaly, no masses and bowel sounds normal  MS: Slightly more than trace edema at the ankles today.  Mostly full range of motion although limited in both shoulders.  Effective use of upper extremities for him.  SKIN: no suspicious lesions or rashes  NEURO: Memory seems bothersome especially names and people.  He certainly recognize me and knew he was here and for what reason.  Moves all  extremities.  He was alert.  PSYCH: mentation appears normal and certainly in agreement with our discussion about nursing home and understood the implication.    Diagnostic Test Results:  Results for orders placed or performed in visit on 03/29/19 (from the past 24 hour(s))   CBC with platelets   Result Value Ref Range    WBC 8.7 4.0 - 11.0 10e9/L    RBC Count 4.13 (L) 4.4 - 5.9 10e12/L    Hemoglobin 12.6 (L) 13.3 - 17.7 g/dL    Hematocrit 36.4 (L) 40.0 - 53.0 %    MCV 88 78 - 100 fl    MCH 30.5 26.5 - 33.0 pg    MCHC 34.6 31.5 - 36.5 g/dL    RDW 12.9 10.0 - 15.0 %    Platelet Count 165 150 - 450 10e9/L   **Comprehensive metabolic panel FUTURE 14d   Result Value Ref Range    Sodium 130 (L) 133 - 144 mmol/L    Potassium 3.9 3.4 - 5.3 mmol/L    Chloride 93 (L) 94 - 109 mmol/L    Carbon Dioxide 29 20 - 32 mmol/L    Anion Gap 8 3 - 14 mmol/L    Glucose 119 (H) 70 - 99 mg/dL    Urea Nitrogen 16 7 - 30 mg/dL    Creatinine 0.97 0.66 - 1.25 mg/dL    GFR Estimate 69 >60 mL/min/[1.73_m2]    GFR Estimate If Black 80 >60 mL/min/[1.73_m2]    Calcium 8.3 (L) 8.5 - 10.1 mg/dL    Bilirubin Total 0.6 0.2 - 1.3 mg/dL    Albumin 3.5 3.4 - 5.0 g/dL    Protein Total 6.2 (L) 6.8 - 8.8 g/dL    Alkaline Phosphatase 133 40 - 150 U/L    ALT 25 0 - 70 U/L    AST 23 0 - 45 U/L   Prostate spec antigen screen   Result Value Ref Range    PSA 1.73 0 - 4 ug/L   UA reflex to Microscopic ([place]; [facility])   Result Value Ref Range    Color Urine Straw     Appearance Urine Clear     Glucose Urine Negative NEG^Negative mg/dL    Bilirubin Urine Negative NEG^Negative    Ketones Urine Negative NEG^Negative mg/dL    Specific Gravity Urine 1.006 1.003 - 1.035    Blood Urine Negative NEG^Negative    pH Urine 7.0 5.0 - 7.0 pH    Protein Albumin Urine Negative NEG^Negative mg/dL    Urobilinogen mg/dL 0.0 0.0 - 2.0 mg/dL    Nitrite Urine Negative NEG^Negative    Leukocyte Esterase Urine Negative NEG^Negative    Source Unspecified Urine   "      ASSESSMENT/PLAN:           Tobacco Cessation:   reports that he quit smoking about 47 years ago. His smoking use included cigarettes. He has a 10.00 pack-year smoking history. he has never used smokeless tobacco.      BMI:   Estimated body mass index is 27.84 kg/m  as calculated from the following:    Height as of 3/27/19: 1.727 m (5' 8\").    Weight as of this encounter: 83.1 kg (183 lb 1.6 oz).         1. Hyponatremia  Resolving but still slightly low.  I certainly would not add hydrochlorothiazide.  Amlodipine and beta-blocker will be continued to manage blood pressure.  We should recheck basic profile in about 1-2 weeks.  - Prostate spec antigen screen    2. Essential hypertension with goal blood pressure less than 140/90  Moderate control at this time.  He is not anemic.  - CBC with platelets  - **Comprehensive metabolic panel FUTURE 14d  - **Basic metabolic panel FUTURE anytime; Future    3. Nutritional deficiency  Encourage high calorie protein rich foods.  If he does enter nursing home, this can be managed by nursing home staff to make sure he gets adequate protein daily.  - CBC with platelets  - **Comprehensive metabolic panel FUTURE 14d    4. Iron deficiency anemia secondary to inadequate dietary iron intake  Slightly low hemoglobin but certainly not a contributing factor.  - CBC with platelets  - **Comprehensive metabolic panel FUTURE 14d    5. Recurrent UTI (urinary tract infection)  History of infection so we are checking urine.  It appears clear.  - UA reflex to Microscopic ([facility]ain[facility] Bridges; [facility])    6. Prostate hypertrophy  Given his urinary frequency, but PSA was checked today and is not very remarkable for a gentleman his age.  Because he is tired and sleeps often and not sure tamsulosin was doing anything, it was stopped.  Family will keep track of urination and level of alertness.    7. Special screening for malignant neoplasm of prostate  See above    Also again discussion about " nursing home and how this might be the most appropriate long-term management for this gentleman.  His full agreement with this plan.  Patient and family seem to be in agreement today as well.  Until then he does need assistance in the home because he would not physically be able to manage all things himself given increasing weakness and some of this early dementia that is most probably present.  Patient Instructions   Trial off tamsulosin.  Is he less tired, does he pee more.  If the first is better and second is worse, I may try another different  med. Should know in a week or so.   We should recheck sodium again by the end of second week.   Ask to have blood pressure checked or have home care check it also.      Khoi Chacko MD  [facility]

## 2019-04-03 ENCOUNTER — DOCUMENTATION ONLY (OUTPATIENT)
Dept: OTHER | Facility: CLINIC | Age: 84
End: 2019-04-03

## 2019-04-08 ENCOUNTER — MYC MEDICAL ADVICE (OUTPATIENT)
Dept: FAMILY MEDICINE | Facility: CLINIC | Age: 84
End: 2019-04-08

## 2019-04-09 DIAGNOSIS — I10 ESSENTIAL HYPERTENSION WITH GOAL BLOOD PRESSURE LESS THAN 140/90: ICD-10-CM

## 2019-04-09 LAB
ANION GAP SERPL CALCULATED.3IONS-SCNC: 6 MMOL/L (ref 3–14)
BUN SERPL-MCNC: 16 MG/DL (ref 7–30)
CALCIUM SERPL-MCNC: 8.6 MG/DL (ref 8.5–10.1)
CHLORIDE SERPL-SCNC: 96 MMOL/L (ref 94–109)
CO2 SERPL-SCNC: 27 MMOL/L (ref 20–32)
CREAT SERPL-MCNC: 1.01 MG/DL (ref 0.66–1.25)
GFR SERPL CREATININE-BSD FRML MDRD: 66 ML/MIN/{1.73_M2}
GLUCOSE SERPL-MCNC: 79 MG/DL (ref 70–99)
POTASSIUM SERPL-SCNC: 4.1 MMOL/L (ref 3.4–5.3)
SODIUM SERPL-SCNC: 129 MMOL/L (ref 133–144)

## 2019-04-09 PROCEDURE — 36415 COLL VENOUS BLD VENIPUNCTURE: CPT | Performed by: FAMILY MEDICINE

## 2019-04-09 PROCEDURE — 80048 BASIC METABOLIC PNL TOTAL CA: CPT | Performed by: FAMILY MEDICINE

## 2019-04-10 DIAGNOSIS — E87.1 HYPONATREMIA: Primary | ICD-10-CM

## 2019-04-16 ENCOUNTER — DOCUMENTATION ONLY (OUTPATIENT)
Dept: CARE COORDINATION | Facility: CLINIC | Age: 84
End: 2019-04-16

## 2019-04-16 NOTE — PROGRESS NOTES
"\"[facility] now requests orders and shares plan of care/discharge summaries for some patients through [employer].  Please REPLY TO THIS MESSAGE OR ROUTE BACK TO THE AUTHOR in order to give authorization for orders when needed.  This is considered a verbal order, you will still receive a faxed copy of orders for signature.  Thank you for your assistance in improving collaboration for our patients.    ORDER for home care re-certification  HN  1 every 60 days 1  For direct supervision and re-certification    HH  2 w 9  [facility], bathing and light housekeeping.     Martin Macias  [phone]        "

## 2019-04-19 DIAGNOSIS — I10 ESSENTIAL HYPERTENSION WITH GOAL BLOOD PRESSURE LESS THAN 140/90: ICD-10-CM

## 2019-04-22 RX ORDER — AMLODIPINE BESYLATE 2.5 MG/1
2.5 TABLET ORAL DAILY
Qty: 90 TABLET | Refills: 1 | Status: SHIPPED | OUTPATIENT
Start: 2019-04-22 | End: 2019-06-28

## 2019-04-22 NOTE — TELEPHONE ENCOUNTER
"Routing refill request to provider for review/approval because:  Bp's > goal  Last prescription written by Walter Jaramillo for 1 month     Norvasc  Last Written Prescription Date:  3/28/2019  Last Fill Quantity: 30,  # refills: 0   Last office visit: 3/29/2019 with prescribing provider:  Ginna   Future Office Visit:      Requested Prescriptions   Pending Prescriptions Disp Refills     amLODIPine (NORVASC) 2.5 MG tablet 30 tablet      Sig: Take 1 tablet (2.5 mg) by mouth daily       Calcium Channel Blockers Protocol  Failed - 4/22/2019  8:55 AM        Failed - Blood pressure under 140/90 in past 12 months     BP Readings from Last 3 Encounters:   03/29/19 142/64   03/28/19 (!) 163/91   06/11/18 112/68                 Passed - Recent (12 mo) or future (30 days) visit within the authorizing provider's specialty     Patient had office visit in the last 12 months or has a visit in the next 30 days with authorizing provider or within the authorizing provider's specialty.  See \"Patient Info\" tab in inbasket, or \"Choose Columns\" in Meds & Orders section of the refill encounter.              Passed - Medication is active on med list        Passed - Patient is age 18 or older        Passed - Normal serum creatinine on file in past 12 months     Recent Labs   Lab Test 04/09/19  0903   CR 1.01               Hannah Li RN on 4/22/2019 at 8:58 AM    "

## 2019-05-21 DIAGNOSIS — I10 ESSENTIAL HYPERTENSION WITH GOAL BLOOD PRESSURE LESS THAN 140/90: ICD-10-CM

## 2019-05-22 RX ORDER — ATENOLOL 50 MG/1
TABLET ORAL
Qty: 60 TABLET | Refills: 1 | Status: SHIPPED | OUTPATIENT
Start: 2019-05-22 | End: 2019-06-28

## 2019-05-22 NOTE — TELEPHONE ENCOUNTER
"Atenolol  Last Written Prescription Date:  8/15/2018  Last Fill Quantity: 180,  # refills: 2   Last office visit: 3/29/2019 with prescribing provider:  Ginna   Future Office Visit:      Requested Prescriptions   Pending Prescriptions Disp Refills     atenolol (TENORMIN) 50 MG tablet [Pharmacy Med Name: ATENOLOL 50 MG TABLET 50 TAB] 180 tablet 2     Sig: TAKE ONE TABLET BY MOUTH TWICE DAILY       Beta-Blockers Protocol Failed - 5/21/2019  3:47 PM        Failed - Blood pressure under 140/90 in past 12 months     BP Readings from Last 3 Encounters:   03/29/19 142/64   03/28/19 (!) 163/91   06/11/18 112/68                 Passed - Patient is age 6 or older        Passed - Recent (12 mo) or future (30 days) visit within the authorizing provider's specialty     Patient had office visit in the last 12 months or has a visit in the next 30 days with authorizing provider or within the authorizing provider's specialty.  See \"Patient Info\" tab in inbasket, or \"Choose Columns\" in Meds & Orders section of the refill encounter.              Passed - Medication is active on med list        Bibi refill given per RN protocol.   Please contact patient to have them schedule the following:  Due for office visit: follow up appt of Bp and low Na- due June 2019    Hannah Li RN on 5/22/2019 at 2:41 PM    "

## 2019-06-18 ENCOUNTER — DOCUMENTATION ONLY (OUTPATIENT)
Dept: CARE COORDINATION | Facility: CLINIC | Age: 84
End: 2019-06-18

## 2019-06-18 NOTE — PROGRESS NOTES
"\"[facility] now requests orders and shares plan of care/discharge summaries for some patients through [employer].  Please REPLY TO THIS MESSAGE OR ROUTE BACK TO THE AUTHOR in order to give authorization for orders when needed.  This is considered a verbal order, you will still receive a faxed copy of orders for signature.  Thank you for your assistance in improving collaboration for our patients.    ORDER for home care re-certification  HN  1 every 60 days 1  For direct supervision and re-certification    HH   2 w 8  1 w 1  [facility] home health aid for personal cares, bathing and light housekeeping.    Martin Macias  [phone]        "

## 2019-06-24 DIAGNOSIS — E78.5 HYPERLIPIDEMIA LDL GOAL <130: ICD-10-CM

## 2019-06-26 RX ORDER — ATORVASTATIN CALCIUM 20 MG/1
TABLET, FILM COATED ORAL
Qty: 30 TABLET | Refills: 0 | Status: SHIPPED | OUTPATIENT
Start: 2019-06-26 | End: 2019-06-28

## 2019-06-26 NOTE — TELEPHONE ENCOUNTER
"Requested Prescriptions   Pending Prescriptions Disp Refills     atorvastatin (LIPITOR) 20 MG tablet [Pharmacy Med Name: ATORVASTATIN CALCIUM 20 MG 20 TAB] 90 tablet 2     Sig: TAKE ONE TABLET BY MOUTH ONCE DAILY   Last Written Prescription Date:  8/15/2018  Last Fill Quantity: 90,  # refills: 2   Last office visit: 3/29/2019 with prescribing provider:  Ginna   Future Office Visit:   Next 5 appointments (look out 90 days)    Jun 28, 2019  8:00 AM CDT  Office Visit with Khoi Bartolome Chacko MD  [facility] ([address] [address]  [phone]             Statins Protocol Failed - 6/24/2019 11:29 AM        Failed - LDL on file in past 12 months     Recent Labs   Lab Test 08/11/17  0928   LDL 36           Passed - No abnormal creatine kinase in past 12 months     No lab results found.             Passed - Recent (12 mo) or future (30 days) visit within the authorizing provider's specialty     Patient had office visit in the last 12 months or has a visit in the next 30 days with authorizing provider or within the authorizing provider's specialty.  See \"Patient Info\" tab in inbasket, or \"Choose Columns\" in Meds & Orders section of the refill encounter.              Passed - Medication is active on med list        Passed - Patient is age 18 or older      Medication is being filled for 1 time refill only due to:  Future labs ordered LDL.    Labs not current:  LDL    Shruti Avendaño RN            "

## 2019-06-28 ENCOUNTER — OFFICE VISIT (OUTPATIENT)
Dept: FAMILY MEDICINE | Facility: CLINIC | Age: 84
End: 2019-06-28
Payer: COMMERCIAL

## 2019-06-28 VITALS
TEMPERATURE: 96.8 F | DIASTOLIC BLOOD PRESSURE: 64 MMHG | WEIGHT: 174 LBS | RESPIRATION RATE: 16 BRPM | SYSTOLIC BLOOD PRESSURE: 128 MMHG | HEART RATE: 60 BPM | OXYGEN SATURATION: 96 % | BODY MASS INDEX: 26.46 KG/M2

## 2019-06-28 DIAGNOSIS — I71.40 ABDOMINAL AORTIC ANEURYSM (AAA) WITHOUT RUPTURE (H): ICD-10-CM

## 2019-06-28 DIAGNOSIS — D50.8 IRON DEFICIENCY ANEMIA SECONDARY TO INADEQUATE DIETARY IRON INTAKE: ICD-10-CM

## 2019-06-28 DIAGNOSIS — E44.1 MILD PROTEIN-CALORIE MALNUTRITION (H): ICD-10-CM

## 2019-06-28 DIAGNOSIS — I10 HYPERTENSION GOAL BP (BLOOD PRESSURE) < 150/90: Primary | ICD-10-CM

## 2019-06-28 DIAGNOSIS — J31.0 CHRONIC RHINITIS: ICD-10-CM

## 2019-06-28 DIAGNOSIS — N39.41 URGENCY INCONTINENCE: ICD-10-CM

## 2019-06-28 DIAGNOSIS — B35.1 ONYCHOMYCOSIS: ICD-10-CM

## 2019-06-28 DIAGNOSIS — E87.1 HYPONATREMIA: ICD-10-CM

## 2019-06-28 DIAGNOSIS — E78.5 HYPERLIPIDEMIA LDL GOAL <130: ICD-10-CM

## 2019-06-28 DIAGNOSIS — N40.0 PROSTATE HYPERTROPHY: ICD-10-CM

## 2019-06-28 DIAGNOSIS — I10 ESSENTIAL HYPERTENSION WITH GOAL BLOOD PRESSURE LESS THAN 140/90: ICD-10-CM

## 2019-06-28 LAB
ALBUMIN UR-MCNC: NEGATIVE MG/DL
ANION GAP SERPL CALCULATED.3IONS-SCNC: 5 MMOL/L (ref 3–14)
APPEARANCE UR: CLEAR
BILIRUB UR QL STRIP: NEGATIVE
BUN SERPL-MCNC: 17 MG/DL (ref 7–30)
CALCIUM SERPL-MCNC: 8.2 MG/DL (ref 8.5–10.1)
CHLORIDE SERPL-SCNC: 102 MMOL/L (ref 94–109)
CHOLEST SERPL-MCNC: 108 MG/DL
CO2 SERPL-SCNC: 27 MMOL/L (ref 20–32)
COLOR UR AUTO: YELLOW
CREAT SERPL-MCNC: 1.14 MG/DL (ref 0.66–1.25)
ERYTHROCYTE [DISTWIDTH] IN BLOOD BY AUTOMATED COUNT: 13.5 % (ref 10–15)
GFR SERPL CREATININE-BSD FRML MDRD: 57 ML/MIN/{1.73_M2}
GLUCOSE SERPL-MCNC: 137 MG/DL (ref 70–99)
GLUCOSE UR STRIP-MCNC: NEGATIVE MG/DL
HCT VFR BLD AUTO: 37.5 % (ref 40–53)
HDLC SERPL-MCNC: 36 MG/DL
HGB BLD-MCNC: 12.7 G/DL (ref 13.3–17.7)
HGB UR QL STRIP: NEGATIVE
KETONES UR STRIP-MCNC: NEGATIVE MG/DL
LDLC SERPL CALC-MCNC: 36 MG/DL
LEUKOCYTE ESTERASE UR QL STRIP: NEGATIVE
MCH RBC QN AUTO: 30.7 PG (ref 26.5–33)
MCHC RBC AUTO-ENTMCNC: 33.9 G/DL (ref 31.5–36.5)
MCV RBC AUTO: 91 FL (ref 78–100)
NITRATE UR QL: NEGATIVE
NONHDLC SERPL-MCNC: 72 MG/DL
PH UR STRIP: 6 PH (ref 5–7)
PLATELET # BLD AUTO: 155 10E9/L (ref 150–450)
POTASSIUM SERPL-SCNC: 3.7 MMOL/L (ref 3.4–5.3)
RBC # BLD AUTO: 4.14 10E12/L (ref 4.4–5.9)
SODIUM SERPL-SCNC: 134 MMOL/L (ref 133–144)
SOURCE: NORMAL
SP GR UR STRIP: 1.01 (ref 1–1.03)
TRIGL SERPL-MCNC: 181 MG/DL
UROBILINOGEN UR STRIP-MCNC: 2 MG/DL (ref 0–2)
WBC # BLD AUTO: 7.3 10E9/L (ref 4–11)

## 2019-06-28 PROCEDURE — 80048 BASIC METABOLIC PNL TOTAL CA: CPT | Performed by: FAMILY MEDICINE

## 2019-06-28 PROCEDURE — 81003 URINALYSIS AUTO W/O SCOPE: CPT | Performed by: FAMILY MEDICINE

## 2019-06-28 PROCEDURE — 80061 LIPID PANEL: CPT | Performed by: FAMILY MEDICINE

## 2019-06-28 PROCEDURE — 36415 COLL VENOUS BLD VENIPUNCTURE: CPT | Performed by: FAMILY MEDICINE

## 2019-06-28 PROCEDURE — 85027 COMPLETE CBC AUTOMATED: CPT | Performed by: FAMILY MEDICINE

## 2019-06-28 PROCEDURE — 99214 OFFICE O/P EST MOD 30 MIN: CPT | Performed by: FAMILY MEDICINE

## 2019-06-28 RX ORDER — ATORVASTATIN CALCIUM 20 MG/1
20 TABLET, FILM COATED ORAL DAILY
Qty: 90 TABLET | Refills: 3 | Status: SHIPPED | OUTPATIENT
Start: 2019-06-28 | End: 2020-06-23

## 2019-06-28 RX ORDER — AMLODIPINE BESYLATE 2.5 MG/1
2.5 TABLET ORAL DAILY
Qty: 90 TABLET | Refills: 3 | Status: SHIPPED | OUTPATIENT
Start: 2019-06-28 | End: 2020-09-14

## 2019-06-28 RX ORDER — FINASTERIDE 5 MG/1
5 TABLET, FILM COATED ORAL DAILY
Qty: 30 TABLET | Refills: 3 | Status: SHIPPED | OUTPATIENT
Start: 2019-06-28 | End: 2019-09-11

## 2019-06-28 RX ORDER — CICLOPIROX 80 MG/ML
SOLUTION TOPICAL
Qty: 6.6 ML | Refills: 3 | Status: SHIPPED | OUTPATIENT
Start: 2019-06-28 | End: 2019-09-11

## 2019-06-28 RX ORDER — ATENOLOL 50 MG/1
50 TABLET ORAL 2 TIMES DAILY
Qty: 180 TABLET | Refills: 1 | Status: SHIPPED | OUTPATIENT
Start: 2019-06-28 | End: 2020-02-19

## 2019-06-28 ASSESSMENT — PAIN SCALES - GENERAL: PAINLEVEL: NO PAIN (0)

## 2019-06-28 ASSESSMENT — ANXIETY QUESTIONNAIRES
5. BEING SO RESTLESS THAT IT IS HARD TO SIT STILL: NOT AT ALL
4. TROUBLE RELAXING: NOT AT ALL
1. FEELING NERVOUS, ANXIOUS, OR ON EDGE: NOT AT ALL
GAD7 TOTAL SCORE: 1
6. BECOMING EASILY ANNOYED OR IRRITABLE: SEVERAL DAYS
2. NOT BEING ABLE TO STOP OR CONTROL WORRYING: NOT AT ALL
7. FEELING AFRAID AS IF SOMETHING AWFUL MIGHT HAPPEN: NOT AT ALL
7. FEELING AFRAID AS IF SOMETHING AWFUL MIGHT HAPPEN: NOT AT ALL
GAD7 TOTAL SCORE: 1
3. WORRYING TOO MUCH ABOUT DIFFERENT THINGS: NOT AT ALL
GAD7 TOTAL SCORE: 1

## 2019-06-28 ASSESSMENT — PATIENT HEALTH QUESTIONNAIRE - PHQ9
SUM OF ALL RESPONSES TO PHQ QUESTIONS 1-9: 5
SUM OF ALL RESPONSES TO PHQ QUESTIONS 1-9: 5

## 2019-06-28 NOTE — PATIENT INSTRUCTIONS
Start finasteride once daily and note if urination frequency decreases.  Generally few side effects but look at insert   All else the same.

## 2019-06-28 NOTE — PROGRESS NOTES
Subjective     Tre Fischer is a 88 year old male who presents to clinic today for the following health issues:    HPI   Hypertension Follow-up      Do you check your blood pressure regularly outside of the clinic? No     Are you following a low salt diet? No    Are your blood pressures ever more than 140 on the top number (systolic) OR more   than 90 on the bottom number (diastolic), for example 140/90? Not checking    Amount of exercise or physical activity: None    Problems taking medications regularly: No    Medication side effects: none    Diet: regular (no restrictions)      Patient sleeps much of the time but he also gets up for 5 times at night urinate.  Tamsulosin was tried without success.  He recently had urinary tract infections and is being followed today to confirm he does not still have infection.  States he is just getting weaker.  He cares for his invalid wife much of the time but has a lot of family support and he is here with his daughter today.  Denies much pain really if any.    Patient Active Problem List   Diagnosis     Impotence of organic origin     Varicose veins of legs     Prostate hypertrophy     History  of basal cell carcinoma     Chronic rhinitis     Chronic left shoulder pain     Reflux esophagitis     Iron deficiency anemia     Essential hypertension with goal blood pressure less than 140/90     Hyponatremia     Anxiety     Nutritional deficiency     Generalized muscle weakness     Head pain     Abdominal aortic aneurysm (AAA) without rupture (H)     Mild protein-calorie malnutrition (H)     Past Surgical History:   Procedure Laterality Date     C NONSPECIFIC PROCEDURE  1980's    Hernia      C NONSPECIFIC PROCEDURE  10/2012    mass removal on scalp - melonoma     CATARACT IOL, RT/LT  7/15    Rt and Lt     COLONOSCOPY N/A 10/11/2016    Procedure: COLONOSCOPY;  Surgeon: Julia Bhatti MD;  Location:  GI     ESOPHAGOSCOPY, GASTROSCOPY, DUODENOSCOPY (EGD), COMBINED N/A  10/11/2016    Procedure: COMBINED ESOPHAGOSCOPY, GASTROSCOPY, DUODENOSCOPY (EGD), BIOPSY SINGLE OR MULTIPLE;  Surgeon: Julia Bhatti MD;  Location:  GI     HC COLONOSCOPY W/WO BRUSH/WASH  05    diverticolosis Repeat in 5yrs     HC REMOVAL OF LEG VEINS/ULCER      left     HC REPAIR ROTATOR CUFF,ACUTE  2002    Rotator cuff repair, right shoulder, with anterior decompression.     HC REVISE MEDIAN N/CARPAL TUNNEL SURG  04/19/10     LAPAROSCOPIC CHOLECYSTECTOMY N/A 10/14/2016    Procedure: LAPAROSCOPIC CHOLECYSTECTOMY;  Surgeon: Julia Bhatti MD;  Location: [facility] OR       Social History     Tobacco Use     Smoking status: Former Smoker     Packs/day: 1.00     Years: 10.00     Pack years: 10.00     Types: Cigarettes     Last attempt to quit: 1972     Years since quittin.5     Smokeless tobacco: Never Used   Substance Use Topics     Alcohol use: No     Alcohol/week: 0.0 oz     Family History   Problem Relation Age of Onset     Diabetes Mother         Adult Onset     Cancer Sister      Diabetes Sister      Hypertension Sister      Cerebrovascular Disease Sister      Heart Disease Brother         valve replacement         BP Readings from Last 3 Encounters:   19 128/64   19 142/64   19 (!) 163/91    Wt Readings from Last 3 Encounters:   19 78.9 kg (174 lb)   19 83.1 kg (183 lb 1.6 oz)   19 81.5 kg (179 lb 10.8 oz)                      Reviewed and updated as needed this visit by Provider         Review of Systems   ROS COMP: Constitutional, HEENT, cardiovascular, pulmonary, gi and gu systems are negative, except as otherwise noted.      Objective    /64   Pulse 60   Temp 96.8  F (36  C) (Temporal)   Resp 16   Wt 78.9 kg (174 lb)   SpO2 96%   BMI 26.46 kg/m    Body mass index is 26.46 kg/m .  Physical Exam   GENERAL: healthy, alert and no distress  EYES: Eyes grossly normal to inspection, PERRL and conjunctivae and sclerae  normal  NECK: no adenopathy, no asymmetry, masses, or scars and thyroid normal to palpation  RESP: lungs clear to auscultation - no rales, rhonchi or wheezes  CV: regular rate and rhythm, normal S1 S2, no S3 or S4, no murmur, click or rub, no peripheral edema and peripheral pulses strong  ABDOMEN: soft, nontender, no hepatosplenomegaly, no masses and bowel sounds normal  MS: no gross musculoskeletal defects noted, no edema  SKIN: no suspicious lesions or rashes mild fungal discoloration of nails with thickening  NEURO: Normal strength and tone, sensory exam grossly normal and mentation intact  PSYCH: mentation appears normal, affect normal/bright    Diagnostic Test Results:  Labs reviewed in Epic  Results for orders placed or performed in visit on 06/28/19 (from the past 24 hour(s))   **CBC with platelets FUTURE anytime   Result Value Ref Range    WBC 7.3 4.0 - 11.0 10e9/L    RBC Count 4.14 (L) 4.4 - 5.9 10e12/L    Hemoglobin 12.7 (L) 13.3 - 17.7 g/dL    Hematocrit 37.5 (L) 40.0 - 53.0 %    MCV 91 78 - 100 fl    MCH 30.7 26.5 - 33.0 pg    MCHC 33.9 31.5 - 36.5 g/dL    RDW 13.5 10.0 - 15.0 %    Platelet Count 155 150 - 450 10e9/L   **Basic metabolic panel FUTURE anytime   Result Value Ref Range    Sodium 134 133 - 144 mmol/L    Potassium 3.7 3.4 - 5.3 mmol/L    Chloride 102 94 - 109 mmol/L    Carbon Dioxide 27 20 - 32 mmol/L    Anion Gap 5 3 - 14 mmol/L    Glucose 137 (H) 70 - 99 mg/dL    Urea Nitrogen 17 7 - 30 mg/dL    Creatinine 1.14 0.66 - 1.25 mg/dL    GFR Estimate 57 (L) >60 mL/min/[1.73_m2]    GFR Estimate If Black 66 >60 mL/min/[1.73_m2]    Calcium 8.2 (L) 8.5 - 10.1 mg/dL   Lipid panel reflex to direct LDL Fasting   Result Value Ref Range    Cholesterol 108 <200 mg/dL    Triglycerides 181 (H) <150 mg/dL    HDL Cholesterol 36 (L) >39 mg/dL    LDL Cholesterol Calculated 36 <100 mg/dL    Non HDL Cholesterol 72 <130 mg/dL   *UA reflex to Microscopic and Culture ([place]; [facility]; [facility]; [place]  - [facility]; [facility]; [facility]; [place]; [place])   Result Value Ref Range    Color Urine Yellow     Appearance Urine Clear     Glucose Urine Negative NEG^Negative mg/dL    Bilirubin Urine Negative NEG^Negative    Ketones Urine Negative NEG^Negative mg/dL    Specific Gravity Urine 1.014 1.003 - 1.035    Blood Urine Negative NEG^Negative    pH Urine 6.0 5.0 - 7.0 pH    Protein Albumin Urine Negative NEG^Negative mg/dL    Urobilinogen mg/dL 2.0 0.0 - 2.0 mg/dL    Nitrite Urine Negative NEG^Negative    Leukocyte Esterase Urine Negative NEG^Negative    Source Midstream Urine            Assessment & Plan     1. Hypertension goal BP (blood pressure) < 150/90  Well-controlled.  Will continue current medications.    2. Iron deficiency anemia secondary to inadequate dietary iron intake  Just mildly anemic now.  No special treatment will be used.  - **CBC with platelets FUTURE anytime    3. Hyponatremia  Resolved at this time.  - **Basic metabolic panel FUTURE anytime    4. Hyperlipidemia LDL goal <130  Well controlled and will continue atorvastatin.  Even given his age, think there is benefit.  - Lipid panel reflex to direct LDL Fasting  - atorvastatin (LIPITOR) 20 MG tablet; Take 1 tablet (20 mg) by mouth daily  Dispense: 90 tablet; Refill: 3    5. Urgency incontinence  No sign of infection.  Most probably prostatic related.  - *UA reflex to Microscopic and Culture ([place]; [facility]; [facility]; [facility]; [facility]; [facility]; [place]; [place])    6. Essential hypertension with goal blood pressure less than 140/90  Continue amlodipine current dose  - amLODIPine (NORVASC) 2.5 MG tablet; Take 1 tablet (2.5 mg) by mouth daily  Dispense: 90 tablet; Refill: 3  - atenolol (TENORMIN) 50 MG tablet; Take 1 tablet (50 mg) by mouth 2 times daily  Dispense: 180 tablet; Refill: 1    7. Prostate hypertrophy  Adding finasteride to see if we can shrink the prostate some limit his nighttime urination and  "therefore help reduce his frequent daytime napping.  He is aware it does not work immediately as this is daughter  - finasteride (PROSCAR) 5 MG tablet; Take 1 tablet (5 mg) by mouth daily  Dispense: 30 tablet; Refill: 3    8. Abdominal aortic aneurysm (AAA) without rupture (H)  Reason we continue with statin and this was noted incidentally during another examination.  I do not think we need to follow-up with an further ultrasound    9. Onychomycosis  Use this.  He apparently had some from someone else  - ciclopirox (PENLAC) 8 % external solution; Apply to adjacent skin and affected nails daily.  Remove with alcohol every 7 days, then repeat.  Dispense: 6.6 mL; Refill: 3    10. Mild protein-calorie malnutrition (H)  Weight loss has diminished. He will eat as able       BMI:   Estimated body mass index is 26.46 kg/m  as calculated from the following:    Height as of 3/27/19: 1.727 m (5' 8\").    Weight as of this encounter: 78.9 kg (174 lb).           Patient Instructions   Start finasteride once daily and note if urination frequency decreases.  Generally few side effects but look at insert   All else the same.       Return in about 3 months (around 9/28/2019) for BP Recheck.    Khoi Chacko MD  [facility]      Answers for HPI/ROS submitted by the patient on 6/28/2019   PHQ9 TOTAL SCORE: 5  PASCUAL 7 TOTAL SCORE: 1    "

## 2019-06-29 ASSESSMENT — PATIENT HEALTH QUESTIONNAIRE - PHQ9: SUM OF ALL RESPONSES TO PHQ QUESTIONS 1-9: 5

## 2019-06-29 ASSESSMENT — ANXIETY QUESTIONNAIRES: GAD7 TOTAL SCORE: 1

## 2019-07-16 ENCOUNTER — OFFICE VISIT (OUTPATIENT)
Dept: AUDIOLOGY | Facility: CLINIC | Age: 84
End: 2019-07-16
Payer: COMMERCIAL

## 2019-07-16 DIAGNOSIS — H90.3 SENSORINEURAL HEARING LOSS, BILATERAL: Primary | ICD-10-CM

## 2019-07-16 PROCEDURE — 92593 HC HEARING AID CHECK, BINAURAL: CPT | Performed by: AUDIOLOGIST

## 2019-07-16 PROCEDURE — 99207 ZZC NO CHARGE LOS: CPT | Performed by: AUDIOLOGIST

## 2019-07-16 NOTE — PROGRESS NOTES
Hearing Aid Check     SUBJECTIVE:  Tre Fischer is a 87 year old year old male, seen today for a hearing aid check at [facility]. Patient reports the hearing aids were purchased from the [employer].  He currently uses [employer] 3 Series i110 LAUREN hearing aids and reported the left device stopped working a week or so ago (  in two) he also requested maintenance on the right device.        OBJECTIVE:  Otoscopic exam indicates minimal non-occluding cerumen bilaterally      Left  was replaced #3 50, yrn ports were vacuumed on both devices, battery compartments were clean on both devices, and the right  was cleaned and the filter replaced.  Replaced both domes with medium double domes.  Listening check revealed appropriate function.       Patient reported improved function after maintenance.     PLAN: Return to clinic as needed for hearing aid maintenance and programming. If he is experiencing continued sound quality or listening difficulties after hearing aid maintenance recommended hearing evaluation and hearing aid programming.    Winnie Lewis Licensed Audiologist #[id number]

## 2019-08-20 ENCOUNTER — DOCUMENTATION ONLY (OUTPATIENT)
Dept: CARE COORDINATION | Facility: CLINIC | Age: 84
End: 2019-08-20

## 2019-08-20 DIAGNOSIS — F41.9 ANXIETY: ICD-10-CM

## 2019-08-20 RX ORDER — FLUOXETINE 10 MG/1
CAPSULE ORAL
Qty: 30 CAPSULE | Refills: 8 | Status: SHIPPED | OUTPATIENT
Start: 2019-08-20 | End: 2019-09-11

## 2019-08-20 NOTE — PROGRESS NOTES
"\"[facility] now requests orders and shares plan of care/discharge summaries for some patients through [employer].  Please REPLY TO THIS MESSAGE OR ROUTE BACK TO THE AUTHOR in order to give authorization for orders when needed.  This is considered a verbal order, you will still receive a faxed copy of orders for signature.  Thank you for your assistance in improving collaboration for our patients.    ORDER for home care re-certification  1 w 1  2 w 8  [facility] home health aid for personal cares, bathing and light housekeeping.    HN  1 every 60 days 1  HN  For direct supervision and re-certification    Martin Macias  [phone]      "

## 2019-08-20 NOTE — TELEPHONE ENCOUNTER
"  Requested Prescriptions   Pending Prescriptions Disp Refills     FLUoxetine (PROZAC) 10 MG capsule [Pharmacy Med Name: FLUoxetine HCL 10 MG CAPS 10 CAP] 30 capsule 9     Sig: TAKE ONE CAPSULE (10 MG) BY MOUTH DAILY   Last Written Prescription Date:  8/15/2018  Last Fill Quantity: 30,  # refills: 9   Last office visit: 6/28/2019 with prescribing provider:     Future Office Visit:   Next 5 appointments (look out 90 days)    Sep 11, 2019  8:00 AM CDT  Office Visit with Khoi Chacko MD  [facility] ([address] [address]  [phone]             SSRIs Protocol Passed - 8/20/2019 11:12 AM        Passed - Recent (12 mo) or future (30 days) visit within the authorizing provider's specialty     Patient had office visit in the last 12 months or has a visit in the next 30 days with authorizing provider or within the authorizing provider's specialty.  See \"Patient Info\" tab in inbasket, or \"Choose Columns\" in Meds & Orders section of the refill encounter.              Passed - Medication is active on med list        Passed - Patient is age 18 or older      Prescription approved per [facility] Refill Protocol.  Shruti Avendaño RN    "

## 2019-09-06 ENCOUNTER — MYC MEDICAL ADVICE (OUTPATIENT)
Dept: FAMILY MEDICINE | Facility: CLINIC | Age: 84
End: 2019-09-06

## 2019-09-06 DIAGNOSIS — E87.1 HYPONATREMIA: ICD-10-CM

## 2019-09-06 DIAGNOSIS — N52.9 IMPOTENCE OF ORGANIC ORIGIN: ICD-10-CM

## 2019-09-06 DIAGNOSIS — E44.1 MILD PROTEIN-CALORIE MALNUTRITION (H): ICD-10-CM

## 2019-09-06 DIAGNOSIS — I10 ESSENTIAL HYPERTENSION WITH GOAL BLOOD PRESSURE LESS THAN 140/90: Primary | ICD-10-CM

## 2019-09-11 ENCOUNTER — OFFICE VISIT (OUTPATIENT)
Dept: FAMILY MEDICINE | Facility: CLINIC | Age: 84
End: 2019-09-11
Payer: COMMERCIAL

## 2019-09-11 VITALS
OXYGEN SATURATION: 97 % | HEART RATE: 62 BPM | SYSTOLIC BLOOD PRESSURE: 138 MMHG | DIASTOLIC BLOOD PRESSURE: 72 MMHG | RESPIRATION RATE: 18 BRPM | WEIGHT: 173.5 LBS | BODY MASS INDEX: 26.38 KG/M2 | TEMPERATURE: 96.6 F

## 2019-09-11 DIAGNOSIS — I10 ESSENTIAL HYPERTENSION WITH GOAL BLOOD PRESSURE LESS THAN 140/90: Primary | ICD-10-CM

## 2019-09-11 DIAGNOSIS — Z23 NEED FOR PROPHYLACTIC VACCINATION AND INOCULATION AGAINST INFLUENZA: ICD-10-CM

## 2019-09-11 DIAGNOSIS — I87.2 VENOUS STASIS DERMATITIS OF LEFT LOWER EXTREMITY: ICD-10-CM

## 2019-09-11 DIAGNOSIS — F41.9 ANXIETY: ICD-10-CM

## 2019-09-11 DIAGNOSIS — E87.1 HYPONATREMIA: ICD-10-CM

## 2019-09-11 DIAGNOSIS — E44.1 MILD PROTEIN-CALORIE MALNUTRITION (H): ICD-10-CM

## 2019-09-11 LAB
ALBUMIN SERPL-MCNC: 3.7 G/DL (ref 3.4–5)
ALP SERPL-CCNC: 142 U/L (ref 40–150)
ALT SERPL W P-5'-P-CCNC: 25 U/L (ref 0–70)
ANION GAP SERPL CALCULATED.3IONS-SCNC: 5 MMOL/L (ref 3–14)
AST SERPL W P-5'-P-CCNC: 22 U/L (ref 0–45)
BILIRUB SERPL-MCNC: 0.4 MG/DL (ref 0.2–1.3)
BUN SERPL-MCNC: 15 MG/DL (ref 7–30)
CALCIUM SERPL-MCNC: 8.9 MG/DL (ref 8.5–10.1)
CHLORIDE SERPL-SCNC: 101 MMOL/L (ref 94–109)
CO2 SERPL-SCNC: 30 MMOL/L (ref 20–32)
CREAT SERPL-MCNC: 0.96 MG/DL (ref 0.66–1.25)
ERYTHROCYTE [DISTWIDTH] IN BLOOD BY AUTOMATED COUNT: 13.1 % (ref 10–15)
GFR SERPL CREATININE-BSD FRML MDRD: 70 ML/MIN/{1.73_M2}
GLUCOSE SERPL-MCNC: 90 MG/DL (ref 70–99)
HCT VFR BLD AUTO: 39.9 % (ref 40–53)
HGB BLD-MCNC: 13.6 G/DL (ref 13.3–17.7)
MCH RBC QN AUTO: 30.8 PG (ref 26.5–33)
MCHC RBC AUTO-ENTMCNC: 34.1 G/DL (ref 31.5–36.5)
MCV RBC AUTO: 90 FL (ref 78–100)
PLATELET # BLD AUTO: 175 10E9/L (ref 150–450)
POTASSIUM SERPL-SCNC: 4.4 MMOL/L (ref 3.4–5.3)
PROT SERPL-MCNC: 6.5 G/DL (ref 6.8–8.8)
RBC # BLD AUTO: 4.42 10E12/L (ref 4.4–5.9)
SODIUM SERPL-SCNC: 136 MMOL/L (ref 133–144)
WBC # BLD AUTO: 8.8 10E9/L (ref 4–11)

## 2019-09-11 PROCEDURE — 90662 IIV NO PRSV INCREASED AG IM: CPT | Performed by: FAMILY MEDICINE

## 2019-09-11 PROCEDURE — G0008 ADMIN INFLUENZA VIRUS VAC: HCPCS | Performed by: FAMILY MEDICINE

## 2019-09-11 PROCEDURE — 36415 COLL VENOUS BLD VENIPUNCTURE: CPT | Performed by: FAMILY MEDICINE

## 2019-09-11 PROCEDURE — 99214 OFFICE O/P EST MOD 30 MIN: CPT | Mod: 25 | Performed by: FAMILY MEDICINE

## 2019-09-11 PROCEDURE — 85027 COMPLETE CBC AUTOMATED: CPT | Performed by: FAMILY MEDICINE

## 2019-09-11 PROCEDURE — 80053 COMPREHEN METABOLIC PANEL: CPT | Performed by: FAMILY MEDICINE

## 2019-09-11 RX ORDER — BISACODYL 5 MG/1
5 TABLET, DELAYED RELEASE ORAL DAILY PRN
COMMUNITY
End: 2021-01-01

## 2019-09-11 RX ORDER — FLUOXETINE 10 MG/1
10 CAPSULE ORAL DAILY
Qty: 90 CAPSULE | Refills: 8 | Status: SHIPPED | OUTPATIENT
Start: 2019-09-11 | End: 2020-09-15

## 2019-09-11 RX ORDER — TRIAMCINOLONE ACETONIDE 1 MG/G
CREAM TOPICAL 2 TIMES DAILY
Qty: 453.6 G | Refills: 1 | Status: SHIPPED | OUTPATIENT
Start: 2019-09-11 | End: 2020-04-08

## 2019-09-11 NOTE — PROGRESS NOTES
Subjective     Tre Fischer is a 89 year old male who presents to clinic today for the following health issues:    HPI   Hypertension Follow-up      Do you check your blood pressure regularly outside of the clinic? No     Are you following a low salt diet? No    Are your blood pressures ever more than 140 on the top number (systolic) OR more   than 90 on the bottom number (diastolic), for example 140/90? Not checking      How many servings of fruits and vegetables do you eat daily?  2-3    On average, how many sweetened beverages do you drink each day (soda, juice, sweet tea, etc)?   1    How many days per week do you miss taking your medication? 0      .  Patient has noticed little or no difference taking finasteride regarding nighttime urination and daytime.  Perhaps slightly reduced daytime urination but nighttime is as frequent.  He does seem less tired.  States he still remains weak.  Appetite is diminished but he eats often enough per patient and family.  His major complaint is an itchy red rash on his left ankle shin.  He will take naps during the daytime.    Patient Active Problem List   Diagnosis     Impotence of organic origin     Varicose veins of legs     Prostate hypertrophy     History  of basal cell carcinoma     Chronic rhinitis     Chronic left shoulder pain     Reflux esophagitis     Iron deficiency anemia     Essential hypertension with goal blood pressure less than 140/90     Hyponatremia     Anxiety     Nutritional deficiency     Generalized muscle weakness     Head pain     Abdominal aortic aneurysm (AAA) without rupture (H)     Mild protein-calorie malnutrition (H)     Past Surgical History:   Procedure Laterality Date     C NONSPECIFIC PROCEDURE  1980's    Hernia      C NONSPECIFIC PROCEDURE  10/2012    mass removal on scalp - melonoma     CATARACT IOL, RT/LT  7/15    Rt and Lt     COLONOSCOPY N/A 10/11/2016    Procedure: COLONOSCOPY;  Surgeon: Julia Bhatti MD;  Location: [facility]      ESOPHAGOSCOPY, GASTROSCOPY, DUODENOSCOPY (EGD), COMBINED N/A 10/11/2016    Procedure: COMBINED ESOPHAGOSCOPY, GASTROSCOPY, DUODENOSCOPY (EGD), BIOPSY SINGLE OR MULTIPLE;  Surgeon: Julia Bhatti MD;  Location: [facility]     HC COLONOSCOPY W/WO BRUSH/WASH  05    diverticolosis Repeat in 5yrs     HC REMOVAL OF LEG VEINS/ULCER      left     HC REPAIR ROTATOR CUFF,ACUTE  2002    Rotator cuff repair, right shoulder, with anterior decompression.     HC REVISE MEDIAN N/CARPAL TUNNEL SURG  04/19/10     LAPAROSCOPIC CHOLECYSTECTOMY N/A 10/14/2016    Procedure: LAPAROSCOPIC CHOLECYSTECTOMY;  Surgeon: Julia Bhatti MD;  Location: [facility]       Social History     Tobacco Use     Smoking status: Former Smoker     Packs/day: 1.00     Years: 10.00     Pack years: 10.00     Types: Cigarettes     Last attempt to quit: 1972     Years since quittin.7     Smokeless tobacco: Never Used   Substance Use Topics     Alcohol use: No     Alcohol/week: 0.0 oz     Family History   Problem Relation Age of Onset     Diabetes Mother         Adult Onset     Cancer Sister      Diabetes Sister      Hypertension Sister      Cerebrovascular Disease Sister      Heart Disease Brother         valve replacement         BP Readings from Last 3 Encounters:   19 138/72   19 128/64   19 142/64    Wt Readings from Last 3 Encounters:   19 78.7 kg (173 lb 8 oz)   19 78.9 kg (174 lb)   19 83.1 kg (183 lb 1.6 oz)                    Reviewed and updated as needed this visit by Provider         Review of Systems   ROS COMP: Constitutional, HEENT, cardiovascular, pulmonary, gi and gu systems are negative, except as otherwise noted.      Objective    /72   Pulse 62   Temp 96.6  F (35.9  C) (Temporal)   Resp 18   Wt 78.7 kg (173 lb 8 oz)   SpO2 97%   BMI 26.38 kg/m    Body mass index is 26.38 kg/m .  Physical Exam   GENERAL: healthy, alert and no distress  EYES: Eyes grossly  normal to inspection, PERRL and conjunctivae and sclerae normal  NECK: no adenopathy, no asymmetry, masses, or scars and thyroid normal to palpation  RESP: lungs clear to auscultation - no rales, rhonchi or wheezes  CV: regular rates and rhythm, no murmur, click or rub, no peripheral edema and varicosities mild varicosities of both lower extremities  ABDOMEN: soft, nontender, no hepatosplenomegaly, no masses and bowel sounds normal  MS: Extremities are grossly negative with more than adequate range of motion for function.  Able to get out of a chair stand and walk without too much difficulty  SKIN: Anterior ankle area and shin area left lower extremity red and inflamed consistent with stasis dermatitis.  There was one slight area on his left foot that perhaps appear to have a little bit of a clearing center with a red rim.  However this combined into the other red regions which did not have similar appearance  NEURO: Grossly negative  PSYCH: mentation appears normal, affect normal/bright    Diagnostic Test Results:  Labs reviewed in Epic  Results for orders placed or performed in visit on 09/11/19 (from the past 24 hour(s))   **Comprehensive metabolic panel FUTURE anytime   Result Value Ref Range    Sodium 136 133 - 144 mmol/L    Potassium 4.4 3.4 - 5.3 mmol/L    Chloride 101 94 - 109 mmol/L    Carbon Dioxide 30 20 - 32 mmol/L    Anion Gap 5 3 - 14 mmol/L    Glucose 90 70 - 99 mg/dL    Urea Nitrogen 15 7 - 30 mg/dL    Creatinine 0.96 0.66 - 1.25 mg/dL    GFR Estimate 70 >60 mL/min/[1.73_m2]    GFR Estimate If Black 81 >60 mL/min/[1.73_m2]    Calcium 8.9 8.5 - 10.1 mg/dL    Bilirubin Total 0.4 0.2 - 1.3 mg/dL    Albumin 3.7 3.4 - 5.0 g/dL    Protein Total 6.5 (L) 6.8 - 8.8 g/dL    Alkaline Phosphatase 142 40 - 150 U/L    ALT 25 0 - 70 U/L    AST 22 0 - 45 U/L   **CBC with platelets FUTURE anytime   Result Value Ref Range    WBC 8.8 4.0 - 11.0 10e9/L    RBC Count 4.42 4.4 - 5.9 10e12/L    Hemoglobin 13.6 13.3 - 17.7  "g/dL    Hematocrit 39.9 (L) 40.0 - 53.0 %    MCV 90 78 - 100 fl    MCH 30.8 26.5 - 33.0 pg    MCHC 34.1 31.5 - 36.5 g/dL    RDW 13.1 10.0 - 15.0 %    Platelet Count 175 150 - 450 10e9/L           Assessment & Plan     1. Essential hypertension with goal blood pressure less than 140/90  Reasonably well-controlled high blood pressure for age with medication.  Would not want to increase medication.  - **Comprehensive metabolic panel FUTURE anytime  - **CBC with platelets FUTURE anytime    2. Hyponatremia  Improved and absent.  No changes  - **Comprehensive metabolic panel FUTURE anytime    3. Mild protein-calorie malnutrition (H)  Slightly more protein and normal albumin today.  No changes planned  - **Comprehensive metabolic panel FUTURE anytime    4. Venous stasis dermatitis of left lower extremity  This is what the rash on the leg looks most like.  There was that one area that appeared slightly fungal in nature.  He certainly has thickened toenails that may be consistent with fungus.  For now no other treatment other than steroid cream to help with redness and itch.  I will continue over-the-counter moisturizing creams as needed.  - triamcinolone (KENALOG) 0.1 % external cream; Apply topically 2 times daily  Dispense: 453.6 g; Refill: 1    5. Anxiety  This medication has helped him so he is less worried about life in general i.e. future plans, work that needs to be done etc.  - FLUoxetine (PROZAC) 10 MG capsule; Take 1 capsule (10 mg) by mouth daily  Dispense: 90 capsule; Refill: 8    6. Need for prophylactic vaccination and inoculation against influenza  Updated against influenza  - FLU VACCINE, INCREASED ANTIGEN, PRESV FREE, AGE 65+ [25275]  - Vaccine Administration, Initial [73227]     BMI:   Estimated body mass index is 26.38 kg/m  as calculated from the following:    Height as of 3/27/19: 1.727 m (5' 8\").    Weight as of this encounter: 78.7 kg (173 lb 8 oz).           Regular exercise  Patient Instructions "   Continue being as active as you are  Use triamcinolone cream until clear, then may repeat if things return.  Use moisturizing cream all winter  Stop finasteride and see if urinary symptoms once again become even worse.  We can resume this or something else if necessary.      Return in about 6 months (around 3/11/2020) for BP Recheck, Routine Visit, MSK problem.    Khoileigh Chacko MD  [facility]

## 2019-09-11 NOTE — PATIENT INSTRUCTIONS
Continue being as active as you are  Use triamcinolone cream until clear, then may repeat if things return.  Use moisturizing cream all winter  Stop finasteride and see if urinary symptoms once again become even worse.  We can resume this or something else if necessary.

## 2019-09-13 DIAGNOSIS — N40.0 PROSTATE HYPERTROPHY: ICD-10-CM

## 2019-09-13 RX ORDER — FINASTERIDE 5 MG/1
5 TABLET, FILM COATED ORAL DAILY
Qty: 30 TABLET | Refills: 3 | Status: SHIPPED | OUTPATIENT
Start: 2019-09-13 | End: 2020-02-19

## 2019-09-13 NOTE — TELEPHONE ENCOUNTER
Reason for Call:  Medication or medication refill:    Do you use a [facility]?  Name of the pharmacy and phone number for the current request:  [facility]    Name of the medication requested: Pt's daughter calling (C2C on file) and states they would like to keep pt on Finasteride 5mg, patient started urinating too often. Please advise.      Other request:     Can we leave a detailed message on this number? YES    Phone number patient can be reached at: Home number on file [phone] (home)    Best Time:     Call taken on 9/13/2019 at 4:15 PM by Raquel Hendrix

## 2019-09-23 NOTE — ED NOTES
ED Nursing criteria listed below was addressed during verbal handoff:     Abnormal vitals: Yes  Abnormal results: Yes  Med Reconciliation completed: Yes  Meds given in ED: Yes  Any Overdue Meds: No  Core Measures: N/A  Isolation: N/A  Special needs: Yes, uses walker  Skin assessment: Yes, no problems    Observation Patient  Education provided: Yes   Anesthesia Type: 1% lidocaine with epinephrine

## 2019-09-24 ENCOUNTER — TELEPHONE (OUTPATIENT)
Dept: FAMILY MEDICINE | Facility: CLINIC | Age: 84
End: 2019-09-24

## 2019-09-24 DIAGNOSIS — A04.8 H. PYLORI INFECTION: ICD-10-CM

## 2019-09-24 NOTE — TELEPHONE ENCOUNTER
Reason for Call:  Form, our goal is to have forms completed with 72 hours, however, some forms may require a visit or additional information.    Type of letter, form or note:  Home Health Certification    Who is the form from?: Home care    Where did the form come from: form was faxed in    What clinic location was the form placed at?: [facility]    Where the form was placed: Given to MA/RN    What number is listed as a contact on the form?: [phone]       Additional comments:     Call taken on 9/24/2019 at 3:56 PM by Monica Goldstein

## 2019-09-25 NOTE — TELEPHONE ENCOUNTER
Medication reconciliation completed by RN. Form and chart forwarded to PCP for signatures.    Marcelina Smith RN

## 2019-09-26 NOTE — TELEPHONE ENCOUNTER
"  Requested Prescriptions   Pending Prescriptions Disp Refills     omeprazole (PRILOSEC) 20 MG DR capsule [Pharmacy Med Name: OMEPRAZOLE 20MG CAP DR--* 20MG CAP] 180 capsule 3     Sig: TAKE 1 CAPSULE (20 MG) BY MOUTH 2 TIMES DAILY   Last Written Prescription Date:  10/17/2018  Last Fill Quantity: 180,  # refills: 3   Last office visit: 9/11/2019 with prescribing provider:     Future Office Visit:        PPI Protocol Passed - 9/24/2019 12:39 PM        Passed - Not on Clopidogrel (unless Pantoprazole ordered)        Passed - No diagnosis of osteoporosis on record        Passed - Recent (12 mo) or future (30 days) visit within the authorizing provider's specialty     Patient had office visit in the last 12 months or has a visit in the next 30 days with authorizing provider or within the authorizing provider's specialty.  See \"Patient Info\" tab in inbasket, or \"Choose Columns\" in Meds & Orders section of the refill encounter.              Passed - Medication is active on med list        Passed - Patient is age 18 or older      Prescription approved per [facility]ill Protocol.  Shruti Avendaño RN    "

## 2019-09-28 ENCOUNTER — HEALTH MAINTENANCE LETTER (OUTPATIENT)
Age: 84
End: 2019-09-28

## 2019-09-30 DIAGNOSIS — Z53.9 DIAGNOSIS NOT YET DEFINED: Primary | ICD-10-CM

## 2019-09-30 PROCEDURE — 99207 C MD RECERTIFICATION HHA PT: CPT | Performed by: FAMILY MEDICINE

## 2019-10-15 ENCOUNTER — DOCUMENTATION ONLY (OUTPATIENT)
Dept: CARE COORDINATION | Facility: CLINIC | Age: 84
End: 2019-10-15

## 2019-10-15 ENCOUNTER — OFFICE VISIT (OUTPATIENT)
Dept: AUDIOLOGY | Facility: CLINIC | Age: 84
End: 2019-10-15
Payer: COMMERCIAL

## 2019-10-15 DIAGNOSIS — H90.3 SENSORINEURAL HEARING LOSS, BILATERAL: Primary | ICD-10-CM

## 2019-10-15 PROCEDURE — V5299 HEARING SERVICE: HCPCS | Performed by: AUDIOLOGIST

## 2019-10-15 PROCEDURE — 99207 ZZC NO CHARGE LOS: CPT | Performed by: AUDIOLOGIST

## 2019-10-15 NOTE — PROGRESS NOTES
Hearing Aid Check     SUBJECTIVE:  Tre Fischer is a 89 year old year old male, seen today for a hearing aid check at [facility]. Patient reports the left device is not functioning. He is also concerned about changes in hearing.        OBJECTIVE:  Biologic listening check revealed the program change alert and start up alert were audible, but no amplification after yrn ports were vacuumed and  changed.  Programmed a loaner for his use under [facility] file Loaner Gordon.  Read settings out of his hearing aids and loaded to the loaner.     PLAN:   Recommended follow up in 2 weeks to evaluate hearing, and program hearing aids after the left device is back from repair. He was advised that the out of warranty charge is $275.    Morelia Lewis.  [place] Licensed Audiologist #[id number]

## 2019-10-15 NOTE — PROGRESS NOTES
"\"[facility] now requests orders and shares plan of care/discharge summaries for some patients through [employer].  Please REPLY TO THIS MESSAGE OR ROUTE BACK TO THE AUTHOR in order to give authorization for orders when needed.  This is considered a verbal order, you will still receive a faxed copy of orders for signature.  Thank you for your assistance in improving collaboration for our patients.    ORDER for home care RCT  HN  1 every 60 days 1  For direct supervision and re-certification      2 w 8  1 w 1  [facility] home health aid for personal cares, bathing and light housekeeping.     Martin Macias  [phone]      "

## 2019-11-08 ENCOUNTER — OFFICE VISIT (OUTPATIENT)
Dept: AUDIOLOGY | Facility: CLINIC | Age: 84
End: 2019-11-08

## 2019-11-08 DIAGNOSIS — H90.3 SENSORINEURAL HEARING LOSS, BILATERAL: Primary | ICD-10-CM

## 2019-11-08 PROCEDURE — V5014 HEARING AID REPAIR/MODIFYING: HCPCS | Mod: LT | Performed by: AUDIOLOGIST

## 2019-11-08 PROCEDURE — 99207 ZZC NO CHARGE LOS: CPT | Performed by: AUDIOLOGIST

## 2019-11-08 NOTE — PROGRESS NOTES
"Hearing Aid Check     SUBJECTIVE:  Tre Fischer is a 89 year old year old male, seen today to  his left Gordon 3 Series i110 LAUREN 312d hearing aid check at [facility]. Patient requests increase in output \"bump up\", and also feedback from the left device.        OBJECTIVE:  Otoscopic exam indicates cerumen in the right ear and clear left canal with visible landmarks.  Cerumen was removed from the right ear using a lighted curette and micro alligator forceps without incident.    His hearing aids were connected to programming software and user settings were restored (currently Loaner Gordon file).      Binaural earmold impressions were taken without incident.     PLAN:   Return to clinic for audiology evaluation, earmold fitting, and hearing aid programming.    Morelia Lewis.  [place] Licensed Audiologist #[id number]      "

## 2019-12-03 ENCOUNTER — OFFICE VISIT (OUTPATIENT)
Dept: AUDIOLOGY | Facility: CLINIC | Age: 84
End: 2019-12-03

## 2019-12-03 ENCOUNTER — OFFICE VISIT (OUTPATIENT)
Dept: AUDIOLOGY | Facility: CLINIC | Age: 84
End: 2019-12-03
Payer: COMMERCIAL

## 2019-12-03 DIAGNOSIS — H90.3 SENSORINEURAL HEARING LOSS, BILATERAL: Primary | ICD-10-CM

## 2019-12-03 PROCEDURE — V5275 EAR IMPRESSION: HCPCS | Mod: RT | Performed by: AUDIOLOGIST

## 2019-12-03 PROCEDURE — 92557 COMPREHENSIVE HEARING TEST: CPT | Performed by: AUDIOLOGIST

## 2019-12-03 PROCEDURE — 99207 ZZC NO CHARGE LOS: CPT | Performed by: AUDIOLOGIST

## 2019-12-03 PROCEDURE — 92550 TYMPANOMETRY & REFLEX THRESH: CPT | Performed by: AUDIOLOGIST

## 2019-12-03 PROCEDURE — V5264 EAR MOLD/INSERT: HCPCS | Mod: RT | Performed by: AUDIOLOGIST

## 2019-12-03 PROCEDURE — V5299 HEARING SERVICE: HCPCS | Performed by: AUDIOLOGIST

## 2019-12-03 NOTE — PROGRESS NOTES
Verification of insurance indicated earmolds were not covered, patient signed Acknowledgement of Self Pay by Request and a copy was scanned to Epic.  See noted for audiology evaluation and hearing aid programming dated 12/3/2019 for details of the visit.    Morelia Lewis.  [place] Licensed Audiologist #[id number]      Charge:     Earmold Impression Quantity 2 ($15 each) $30   Earmold Quantity 2 ($80 each) $160  Total $190 (patient paid date of service)

## 2019-12-03 NOTE — PROGRESS NOTES
AUDIOLOGY REPORT    SUBJECTIVE:  Tre Fischer is a 89 year old male who was seen at [facility] for audiologic evaluation and hearing aid programming after his left hearing aid was repaired, self-referred. The patient reports increased difficulty hearing face to face conversations even with hearing aids. The patient notes difficulty with communication in a variety of listening situations.  They were accompanied today by their daughter.    OBJECTIVE:  Otoscopic exam indicates ears are clear of cerumen bilaterally     Pure Tone Thresholds assessed using conventional audiometry with good  reliability from 250-8000 Hz bilaterally using insert earphones     RIGHT:  moderate sloping to profound sensorineural hearing loss    LEFT:    moderate sloping to profound sensorineural hearing loss    Tympanogram:    RIGHT: normal eardrum mobility    LEFT:   normal eardrum mobility    Reflexes (reported by stimulus ear):  RIGHT: Ipsilateral is absent at frequencies tested  RIGHT: Contralateral is absent at frequencies tested  LEFT:   Ipsilateral is absent at frequencies tested  LEFT:   Contralateral is absent at frequencies tested    Speech Reception Threshold:    RIGHT: 80 dB HL    LEFT:   65 dB HL    Word Recognition Score:     RIGHT: 36% at 105 dB HL using NU-6 recorded word list.    RIGHT: 36% at 95 dB HL using NU-6 recorded word list.    LEFT:   60% at 100 dB HL using NU-6 recorded word list.    LEFT:   52% at 95 dB HL using NU-6 recorded word list.    BINAURAL: 72% right 105 dB left 100 dB using NU-6 recorded word list.    New custom earmolds were fit to hearing aids after maintenance to vacuum mics and receivers (billed in a separate visit for insurance reasons).  Current user hearing aid settings were saved. Hearing aids were reset and programming changes were made to attempt to match NAL-NL1 prescriptive targets.  There was a good match in the right ear with a 65 dB input from 250 Hz to 1500  Hz with 3-5 dB under amplification at 2000 Hz and 10 dB under amplification at 3000 Hz and 4000 Hz (hearing aids are maxed out with a 60 dB gain ).  Left ear output was increased but could not be turned up as much as the right ear because the device has a 50 dB gain , good match to NAL-NL1 prescriptive target at 500 Hz and 750 Hz under amplification at other frequencies.  Overall audibility was improved in both the right and left hearing aids after adjustments.  Average output before adjustments was at or below threshold.      ASSESSMENT:     ICD-10-CM    1. Sensorineural hearing loss, bilateral H90.3 COMPREHENSIVE HEARING TEST     TYMPANOMETRY AND REFLEX THRESHOLD MEASUREMENTS     Compared to patient's audiogram stored in hearing aid software, hearing has remained stable. Today s results were discussed with the patient in detail.     PLAN:   It is recommended that the patient return in two weeks to follow up on changes and to fit a 60 dB gain  to the left hearing aid for additional output and better match to speech targets..  Please call this clinic with questions regarding these results or recommendations.        Morelia Lewis.  [place] Licensed Audiologist #[id number]

## 2019-12-17 ENCOUNTER — DOCUMENTATION ONLY (OUTPATIENT)
Dept: CARE COORDINATION | Facility: CLINIC | Age: 84
End: 2019-12-17

## 2019-12-17 NOTE — PROGRESS NOTES
"\"[facility] now requests orders and shares plan of care/discharge summaries for some patients through [employer].  Please REPLY TO THIS MESSAGE OR ROUTE BACK TO THE AUTHOR in order to give authorization for orders when needed.  This is considered a verbal order, you will still receive a faxed copy of orders for signature.  Thank you for your assistance in improving collaboration for our patients.    ORDER for home care re-certification  HN  1 every 60 days 1  For direct supervision and re-certification    HH  1 w 1  2 w 2  3 w 6  [facility] home health aid for personal cares, bathing and light housekeeping.     Martin Macias  [phone]        "

## 2019-12-20 ENCOUNTER — OFFICE VISIT (OUTPATIENT)
Dept: AUDIOLOGY | Facility: CLINIC | Age: 84
End: 2019-12-20
Payer: COMMERCIAL

## 2019-12-20 DIAGNOSIS — H90.3 SENSORINEURAL HEARING LOSS, BILATERAL: Primary | ICD-10-CM

## 2019-12-20 PROCEDURE — V5299 HEARING SERVICE: HCPCS | Performed by: AUDIOLOGIST

## 2019-12-20 PROCEDURE — 99207 ZZC NO CHARGE LOS: CPT | Performed by: AUDIOLOGIST

## 2019-12-26 ENCOUNTER — TELEPHONE (OUTPATIENT)
Dept: AUDIOLOGY | Facility: CLINIC | Age: 84
End: 2019-12-26

## 2019-12-26 NOTE — TELEPHONE ENCOUNTER
Reason for Call:  Other appointment    Detailed comments: Patient's daughter Ava is calling to see if [facility] hearing aid is in yet? Please call her at [phone]    Phone Number Patient can be reached at: Home number on      Best Time: any    Can we leave a detailed message on this number? YES    Call taken on 12/26/2019 at 1:45 PM by Sarah Mcdaniel

## 2019-12-27 ENCOUNTER — OFFICE VISIT (OUTPATIENT)
Dept: AUDIOLOGY | Facility: CLINIC | Age: 84
End: 2019-12-27
Payer: COMMERCIAL

## 2019-12-27 DIAGNOSIS — H90.3 SENSORINEURAL HEARING LOSS, BILATERAL: Primary | ICD-10-CM

## 2019-12-27 PROCEDURE — 99207 ZZC NO CHARGE LOS: CPT | Performed by: AUDIOLOGIST

## 2019-12-27 PROCEDURE — V5299 HEARING SERVICE: HCPCS | Performed by: AUDIOLOGIST

## 2019-12-27 NOTE — PROGRESS NOTES
Hearing Aid Check     SUBJECTIVE:  Tre Fischer is a 89 year old year old male, seen today to fit replacement receivers, Gordon #4 60 gain right and left, at [facility]. Patient reports overall improved hearing after fitting custom earmolds and reprogramming hearing aids.  The left  needed to be changed from 50 to 60 and both receivers needed to be increased in length from #3 to #4 as the  wire was too short and leaving a andrez and causing soreness on both ears (more on the right).     OBJECTIVE:  Otoscopic exam indicates ears are clear of cerumen bilaterally.      Hearing aid acoustics were changed in software from 50 gain  to 60 gain  in the left device. Real ear measures were obtained and adjustments were made to his hearing aids to match NAL-NL1 prescriptive targets.  There was an excellent match to NAL-NL1 targets from 250 Hz to 1500 Hz and slight under amplification 2000 Hz to 4000 Hz right (highs are maxed out) and from 250 Hz to 2000 Hz with slight under amplification 3000 Hz to 4000 Hz left ear (highs maxed out). He reported balanced sound from the right and left and noticed the left was louder, needed to add an occluding vent plug right and left to control feedback.  He reported conversation was comfortable.     PLAN:   Return to clinic as needed for hearing aid maintenance and programming.  Discussed remake period if feedback is a problem    Morelia Lewis.  [place] Licensed Audiologist #[id number]

## 2019-12-27 NOTE — TELEPHONE ENCOUNTER
Spoke with Ava, received replacement receivers, scheduled programming appointment at 3:30 today.    Morelia Lewis.  [place] Licensed Audiologist #[id number]

## 2020-01-15 ENCOUNTER — TELEPHONE (OUTPATIENT)
Dept: FAMILY MEDICINE | Facility: CLINIC | Age: 85
End: 2020-01-15

## 2020-01-15 DIAGNOSIS — Z53.9 DIAGNOSIS NOT YET DEFINED: Primary | ICD-10-CM

## 2020-01-15 PROCEDURE — 99207 C MD RECERTIFICATION HHA PT: CPT | Performed by: FAMILY MEDICINE

## 2020-01-15 RX ORDER — DOCUSATE CALCIUM 240 MG
240 CAPSULE ORAL DAILY
COMMUNITY
End: 2021-01-01

## 2020-01-15 NOTE — TELEPHONE ENCOUNTER
Reason for Call:  Form, our goal is to have forms completed with 72 hours, however, some forms may require a visit or additional information.    Type of letter, form or note:  Home Health Certification    Who is the form from?: Home care    Where did the form come from: form was faxed in    What clinic location was the form placed at?: [facility]    Where the form was placed: Given to MA/RN    What number is listed as a contact on the form?: [phone]         Additional comments:     Call taken on 1/15/2020 at 3:27 PM by Monica Goldstein

## 2020-01-15 NOTE — TELEPHONE ENCOUNTER
Medication reconciliation completed by RN. Form and chart forwarded to PCP for signatures.  Certification dates: 12/19/19-02/16/2020  Marcelina Smith RN

## 2020-02-11 NOTE — PROGRESS NOTES
Hearing Aid Check     SUBJECTIVE:  Tre Fischer is a 89 year old year old male, seen today for a hearing aid check at [facility]. Patient reports improved hearing and decreased feedback using the custom earmolds. However now with deeper insertion the #3 length is too short and cutting into his ear.        OBJECTIVE:  Need to increase to #4 60 dB receivers right and left.     PLAN:   Receivers will be ordered and programming and real ear measures will be scheduled upon receipt.    Morelia Lewis.  [place] Licensed Audiologist #[id number]

## 2020-02-12 ENCOUNTER — DOCUMENTATION ONLY (OUTPATIENT)
Dept: CARE COORDINATION | Facility: CLINIC | Age: 85
End: 2020-02-12

## 2020-02-12 NOTE — PROGRESS NOTES
"\"[facility] now requests orders and shares plan of care/discharge summaries for some patients through [email or web address].  Please REPLY TO THIS MESSAGE OR ROUTE BACK TO THE AUTHOR in order to give authorization for orders when needed.  This is considered a verbal order, you will still receive a faxed copy of orders for signature.  Thank you for your assistance in improving collaboration for our patients.    ORDER for home care re-certification  HN  1 every 60 days 1  For direct supervision and re-certification    3 w 8  2 w 1  [facility] home health aid for personal cares, bathing and light housekeeping.     Martin Macias  [phone]      "

## 2020-02-18 DIAGNOSIS — N40.0 PROSTATE HYPERTROPHY: ICD-10-CM

## 2020-02-18 DIAGNOSIS — I10 ESSENTIAL HYPERTENSION WITH GOAL BLOOD PRESSURE LESS THAN 140/90: ICD-10-CM

## 2020-02-19 RX ORDER — ATENOLOL 50 MG/1
50 TABLET ORAL 2 TIMES DAILY
Qty: 180 TABLET | Refills: 1 | Status: SHIPPED | OUTPATIENT
Start: 2020-02-19 | End: 2020-09-14

## 2020-02-19 RX ORDER — FINASTERIDE 5 MG/1
5 TABLET, FILM COATED ORAL DAILY
Qty: 30 TABLET | Refills: 3 | Status: SHIPPED | OUTPATIENT
Start: 2020-02-19 | End: 2020-03-13

## 2020-02-19 NOTE — TELEPHONE ENCOUNTER
Prescription approved per [facility] Refill Protocol.    Maru Baez RN on 2/19/2020 at 12:35 PM

## 2020-02-19 NOTE — TELEPHONE ENCOUNTER
"Requested Prescriptions   Pending Prescriptions Disp Refills     FINASTERIDE 5 MG PO tablet [Pharmacy Med Name: FINASTERIDE 5 MG TABLET 5 TAB] 30 tablet 3     Sig: TAKE 1 TABLET (5 MG) BY MOUTH DAILY   Last Written Prescription Date:  9/13/19  Last Fill Quantity: 30,  # refills: 3   Last office visit: 9/11/2019 with prescribing provider:  Ginna   Future Office Visit:        BPH Agents Passed - 2/18/2020  6:06 PM        Passed - Recent (12 mo) or future (30 days) visit within the authorizing provider's department     Patient has had an office visit with the authorizing provider or a provider within the authorizing providers department within the previous 12 mos or has a future within next 30 days. See \"Patient Info\" tab in inbasket, or \"Choose Columns\" in Meds & Orders section of the refill encounter.              Passed - Medication is active on med list        Passed - Patient is 18 years of age or older          "

## 2020-02-19 NOTE — TELEPHONE ENCOUNTER
Prescription approved per [facility] Refill Protocol.    Maru Baez RN on 2/19/2020 at 12:36 PM

## 2020-02-19 NOTE — TELEPHONE ENCOUNTER
"Requested Prescriptions   Pending Prescriptions Disp Refills     ATENOLOL 50 MG PO tablet [Pharmacy Med Name: ATENOLOL 50 MG TABLET 50 TAB] 180 tablet 2     Sig: TAKE 1 TABLET (50 MG) BY MOUTH 2 TIMES DAILY   Last Written Prescription Date:  6/28/19  Last Fill Quantity: 180,  # refills: 1   Last office visit: 9/11/2019 with prescribing provider:  Ginna   Future Office Visit:        Beta-Blockers Protocol Passed - 2/18/2020  6:07 PM        Passed - Blood pressure under 140/90 in past 12 months     BP Readings from Last 3 Encounters:   09/11/19 138/72   06/28/19 128/64   03/29/19 142/64           Passed - Patient is age 6 or older        Passed - Recent (12 mo) or future (30 days) visit within the authorizing provider's specialty     Patient has had an office visit with the authorizing provider or a provider within the authorizing providers department within the previous 12 mos or has a future within next 30 days. See \"Patient Info\" tab in inbasket, or \"Choose Columns\" in Meds & Orders section of the refill encounter.              Passed - Medication is active on med list          "

## 2020-03-13 ENCOUNTER — MYC MEDICAL ADVICE (OUTPATIENT)
Dept: FAMILY MEDICINE | Facility: CLINIC | Age: 85
End: 2020-03-13

## 2020-03-13 DIAGNOSIS — N40.0 PROSTATE HYPERTROPHY: ICD-10-CM

## 2020-03-13 RX ORDER — FINASTERIDE 5 MG/1
5 TABLET, FILM COATED ORAL DAILY
Qty: 90 TABLET | Refills: 1 | Status: SHIPPED | OUTPATIENT
Start: 2020-03-13 | End: 2020-07-10

## 2020-03-15 ENCOUNTER — HEALTH MAINTENANCE LETTER (OUTPATIENT)
Age: 85
End: 2020-03-15

## 2020-04-06 DIAGNOSIS — I87.2 VENOUS STASIS DERMATITIS OF LEFT LOWER EXTREMITY: ICD-10-CM

## 2020-04-08 ENCOUNTER — TELEPHONE (OUTPATIENT)
Dept: FAMILY MEDICINE | Facility: CLINIC | Age: 85
End: 2020-04-08

## 2020-04-08 RX ORDER — TRIAMCINOLONE ACETONIDE 1 MG/G
CREAM TOPICAL 2 TIMES DAILY
Qty: 454 G | Refills: 1 | Status: SHIPPED | OUTPATIENT
Start: 2020-04-08 | End: 2020-09-14

## 2020-04-08 NOTE — TELEPHONE ENCOUNTER
Prescription approved per [facility] Refill Protocol.    Maru Baez RN on 4/8/2020 at 12:15 PM

## 2020-04-08 NOTE — TELEPHONE ENCOUNTER
The medication they are talking about is triamcinolone (KENALOG) 0.1 % external cream.  I talked to Tre's daughter and she it is for both legs below the knee for dry skin. Pharmacy is aware.

## 2020-04-08 NOTE — TELEPHONE ENCOUNTER
Reason for Call:  Other call back- prescription     Detailed comments: Pharmacy received script for a cream for 454 grams, pharmacist would like to know what area is this being applied to for this amount of cream     Phone Number Patient can be reached at: Other phone number:  [phone]    Best Time: anytime     Can we leave a detailed message on this number? YES    Call taken on 4/8/2020 at 12:19 PM by Mariposa Smith

## 2020-04-08 NOTE — TELEPHONE ENCOUNTER
Pharmacy was informed that kenalog cream is for both legs per Dr. Chacko.    Mindy Barker, [facility]

## 2020-04-08 NOTE — TELEPHONE ENCOUNTER
Does pharmacy have ability to see associated diagnosis for medications in Epic? If they do, this question may not even be necessary. I find it is clearly associated with his legs when I check on medication details in his med list. I know pharmacy requires an accurate location for instructions but   perhaps if they access to see the diagnosis it would save them and us time. If they cannot access the diagnosis, I will be more clear with instructions going forward.  Let the pharmacy know cream is for his legs/ stasis dermatitis and therefore such a large amount was ordered.  It is nearly a permanent problem for Tre.  Khoi Chacko MD.

## 2020-04-08 NOTE — TELEPHONE ENCOUNTER
"Requested Prescriptions   Pending Prescriptions Disp Refills     triamcinolone (KENALOG) 0.1 % external cream [Pharmacy Med Name: TRIAMCINOLONE 0.1% CREAM 0.1 CRM] 454 g 1     Sig: APPLY TOPICALLY 2 TIMES DAILY   Last Written Prescription Date:  9/11/19  Last Fill Quantity: 453.6g,  # refills: 1   Last office visit: 9/11/2019 with prescribing provider:  Ginna   Future Office Visit:        Topical Steroids and Nonsteroidals Protocol Passed - 4/6/2020  9:39 AM        Passed - Patient is age 6 or older        Passed - Authorizing prescriber's most recent note related to this medication read.     If refill request is for ophthalmic use, please forward request to provider for approval.          Passed - High potency steroid not ordered        Passed - Recent (12 mo) or future (30 days) visit within the authorizing provider's specialty     Patient has had an office visit with the authorizing provider or a provider within the authorizing providers department within the previous 12 mos or has a future within next 30 days. See \"Patient Info\" tab in inbasket, or \"Choose Columns\" in Meds & Orders section of the refill encounter.              Passed - Medication is active on med list             "

## 2020-04-15 ENCOUNTER — DOCUMENTATION ONLY (OUTPATIENT)
Dept: FAMILY MEDICINE | Facility: CLINIC | Age: 85
End: 2020-04-15

## 2020-04-15 NOTE — PROGRESS NOTES
"\"[facility] now requests orders and shares plan of care/discharge summaries for some patients through [employer].  Please REPLY TO THIS MESSAGE OR ROUTE BACK TO THE AUTHOR in order to give authorization for orders when needed.  This is considered a verbal order, you will still receive a faxed copy of orders for signature.  Thank you for your assistance in improving collaboration for our patients.    ORDER for home care re-certification  HN  1 every 60 days 1  For direct supervision and re-certification    HH  1 w 1  3 w 8  1 w 1  [facility] home health aid for personal cares, bathing and light housekeeping.     Martin Macias  [phone]          "

## 2020-05-28 DIAGNOSIS — J31.0 CHRONIC RHINITIS: ICD-10-CM

## 2020-05-29 RX ORDER — AZELASTINE 1 MG/ML
SPRAY, METERED NASAL
Qty: 30 ML | Refills: 7 | Status: SHIPPED | OUTPATIENT
Start: 2020-05-29 | End: 2021-02-17

## 2020-05-29 NOTE — TELEPHONE ENCOUNTER
Astelin 0.1% nasal spray  Last Written Prescription Date:  3/25/19  Last Fill Quantity: 30 ml,  # refills: 7   Last office visit: 9/11/2019 with prescribing provider:  Dr. Chacko   Future Office Visit:  NONE  Failed refill protocol due to age. If > 64 refer to provider for refill.  PAOLA Luque

## 2020-06-15 ENCOUNTER — DOCUMENTATION ONLY (OUTPATIENT)
Dept: FAMILY MEDICINE | Facility: CLINIC | Age: 85
End: 2020-06-15

## 2020-06-22 DIAGNOSIS — E78.5 HYPERLIPIDEMIA LDL GOAL <130: ICD-10-CM

## 2020-06-23 RX ORDER — ATORVASTATIN CALCIUM 20 MG/1
20 TABLET, FILM COATED ORAL DAILY
Qty: 90 TABLET | Refills: 3 | Status: SHIPPED | OUTPATIENT
Start: 2020-06-23 | End: 2021-01-01

## 2020-06-23 NOTE — TELEPHONE ENCOUNTER
Prescription approved per [facility] Refill Protocol.  Marcelina Smith RN

## 2020-07-10 DIAGNOSIS — N40.0 PROSTATE HYPERTROPHY: ICD-10-CM

## 2020-07-10 RX ORDER — FINASTERIDE 5 MG/1
5 TABLET, FILM COATED ORAL DAILY
Qty: 90 TABLET | Refills: 1 | Status: SHIPPED | OUTPATIENT
Start: 2020-07-10 | End: 2020-11-02

## 2020-07-10 NOTE — TELEPHONE ENCOUNTER
Prescription approved per [facility] Refill Protocol.  Marcelina Smith RN

## 2020-08-10 ENCOUNTER — DOCUMENTATION ONLY (OUTPATIENT)
Dept: FAMILY MEDICINE | Facility: CLINIC | Age: 85
End: 2020-08-10

## 2020-08-10 NOTE — PROGRESS NOTES
"\"[facility] now requests orders and shares plan of care/discharge summaries for some patients through [employer].  Please REPLY TO THIS MESSAGE OR ROUTE BACK TO THE AUTHOR in order to give authorization for orders when needed.  This is considered a verbal order, you will still receive a faxed copy of orders for signature.  Thank you for your assistance in improving collaboration for our patients.    ORDER for home care re-certification  HN  1 every 60 days 1  HN for direct supervision and re-certification      3 w 8  1 w 1  Visits to begin week of 8/16/20  [facility] for personal cares, bathing and light housekeeping.     Martin Macias -[phone]      "

## 2020-08-14 ENCOUNTER — OFFICE VISIT (OUTPATIENT)
Dept: AUDIOLOGY | Facility: CLINIC | Age: 85
End: 2020-08-14
Payer: COMMERCIAL

## 2020-08-14 DIAGNOSIS — E87.1 HYPONATREMIA: ICD-10-CM

## 2020-08-14 DIAGNOSIS — I10 ESSENTIAL HYPERTENSION WITH GOAL BLOOD PRESSURE LESS THAN 140/90: ICD-10-CM

## 2020-08-14 DIAGNOSIS — D50.8 IRON DEFICIENCY ANEMIA SECONDARY TO INADEQUATE DIETARY IRON INTAKE: ICD-10-CM

## 2020-08-14 DIAGNOSIS — H90.3 SENSORINEURAL HEARING LOSS, BILATERAL: Primary | ICD-10-CM

## 2020-08-14 DIAGNOSIS — E63.9 NUTRITIONAL DEFICIENCY: ICD-10-CM

## 2020-08-14 LAB
ALBUMIN SERPL-MCNC: 3.4 G/DL (ref 3.4–5)
ALP SERPL-CCNC: 117 U/L (ref 40–150)
ALT SERPL W P-5'-P-CCNC: 22 U/L (ref 0–70)
ANION GAP SERPL CALCULATED.3IONS-SCNC: 6 MMOL/L (ref 3–14)
AST SERPL W P-5'-P-CCNC: 19 U/L (ref 0–45)
BILIRUB SERPL-MCNC: 0.4 MG/DL (ref 0.2–1.3)
BUN SERPL-MCNC: 19 MG/DL (ref 7–30)
CALCIUM SERPL-MCNC: 8.1 MG/DL (ref 8.5–10.1)
CHLORIDE SERPL-SCNC: 101 MMOL/L (ref 94–109)
CO2 SERPL-SCNC: 26 MMOL/L (ref 20–32)
CREAT SERPL-MCNC: 1.08 MG/DL (ref 0.66–1.25)
ERYTHROCYTE [DISTWIDTH] IN BLOOD BY AUTOMATED COUNT: 14.6 % (ref 10–15)
GFR SERPL CREATININE-BSD FRML MDRD: 60 ML/MIN/{1.73_M2}
GLUCOSE SERPL-MCNC: 141 MG/DL (ref 70–99)
HCT VFR BLD AUTO: 34.7 % (ref 40–53)
HGB BLD-MCNC: 11.5 G/DL (ref 13.3–17.7)
MCH RBC QN AUTO: 28.7 PG (ref 26.5–33)
MCHC RBC AUTO-ENTMCNC: 33.1 G/DL (ref 31.5–36.5)
MCV RBC AUTO: 87 FL (ref 78–100)
PLATELET # BLD AUTO: 177 10E9/L (ref 150–450)
POTASSIUM SERPL-SCNC: 4.1 MMOL/L (ref 3.4–5.3)
PROT SERPL-MCNC: 6.2 G/DL (ref 6.8–8.8)
RBC # BLD AUTO: 4.01 10E12/L (ref 4.4–5.9)
SODIUM SERPL-SCNC: 133 MMOL/L (ref 133–144)
WBC # BLD AUTO: 8.7 10E9/L (ref 4–11)

## 2020-08-14 PROCEDURE — 36415 COLL VENOUS BLD VENIPUNCTURE: CPT | Performed by: FAMILY MEDICINE

## 2020-08-14 PROCEDURE — 85027 COMPLETE CBC AUTOMATED: CPT | Performed by: FAMILY MEDICINE

## 2020-08-14 PROCEDURE — V5299 HEARING SERVICE: HCPCS | Performed by: AUDIOLOGIST

## 2020-08-14 PROCEDURE — 99207 ZZC NO CHARGE LOS: CPT | Performed by: AUDIOLOGIST

## 2020-08-14 PROCEDURE — 80053 COMPREHEN METABOLIC PANEL: CPT | Performed by: FAMILY MEDICINE

## 2020-08-14 NOTE — PROGRESS NOTES
Hearing Aid Check     SUBJECTIVE:  Tre Fischer is a 89 year old year old male, seen at [facility] for binaural hearing aid check. Patient reports the left hearing aid is intermittent. Previous audiology evaluation 12/3/2019 showed moderate to severe profound sensorineural hearing loss bilaterally.  Patient is using binaural Gordon 3 Series i110 LAUREN 312 devices with custom canal molds.     OBJECTIVE:  Otoscopic exam showed some non-occluding wax bilaterally removed without incident using a lighted curette. Visual inspection showed left wax filter occluded and right partially occluded. Removed wax filters and vacuumed receivers. Replaced wax filters. Vacuumed mics.  Biologic listening check after revealed appropriate function. Patient reported improved function as well.     PLAN:   Return to clinic as needed for hearing aid maintenance and programming.  Provided replaced wax filters and demonstrated use.     Winnie Lewis Licensed Audiologist #[id number]

## 2020-09-12 DIAGNOSIS — I87.2 VENOUS STASIS DERMATITIS OF LEFT LOWER EXTREMITY: ICD-10-CM

## 2020-09-12 DIAGNOSIS — I10 ESSENTIAL HYPERTENSION WITH GOAL BLOOD PRESSURE LESS THAN 140/90: ICD-10-CM

## 2020-09-14 DIAGNOSIS — F41.9 ANXIETY: ICD-10-CM

## 2020-09-14 RX ORDER — ATENOLOL 50 MG/1
50 TABLET ORAL 2 TIMES DAILY
Qty: 60 TABLET | Refills: 0 | Status: SHIPPED | OUTPATIENT
Start: 2020-09-14 | End: 2020-10-21

## 2020-09-14 RX ORDER — TRIAMCINOLONE ACETONIDE 1 MG/G
CREAM TOPICAL
Qty: 454 G | Refills: 0 | Status: SHIPPED | OUTPATIENT
Start: 2020-09-14 | End: 2020-11-02

## 2020-09-14 RX ORDER — AMLODIPINE BESYLATE 2.5 MG/1
2.5 TABLET ORAL DAILY
Qty: 30 TABLET | Refills: 0 | Status: SHIPPED | OUTPATIENT
Start: 2020-09-14 | End: 2020-10-21

## 2020-09-14 NOTE — TELEPHONE ENCOUNTER
Patients daughter Debora answered (c2c on file) patient is going to do a telephone visit as they previously discussed with dr amin he just had his labs done as well.

## 2020-09-14 NOTE — TELEPHONE ENCOUNTER
"Requested Prescriptions   Pending Prescriptions Disp Refills     amLODIPine (NORVASC) 2.5 MG tablet [Pharmacy Med Name: AMLODIPINE BESYLATE 2.5 MG 2.5 TAB] 90 tablet 3     Sig: TAKE 1 TABLET (2.5 MG) BY MOUTH DAILY   Last Written Prescription Date:  6/28/2019  Last Fill Quantity: 90,  # refills: 3   Last office visit: 9/11/2019 with prescribing provider:     Future Office Visit:      Calcium Channel Blockers Protocol  Failed - 9/12/2020 11:38 AM        Failed - Blood pressure under 140/90 in past 12 months     BP Readings from Last 3 Encounters:   09/11/19 138/72   06/28/19 128/64   03/29/19 142/64           Failed - Recent (12 mo) or future (30 days) visit within the authorizing provider's specialty     Patient has had an office visit with the authorizing provider or a provider within the authorizing providers department within the previous 12 mos or has a future within next 30 days. See \"Patient Info\" tab in inbasket, or \"Choose Columns\" in Meds & Orders section of the refill encounter.            Passed - Medication is active on med list        Passed - Patient is age 18 or older        Passed - Normal serum creatinine on file in past 12 months     Recent Labs   Lab Test 08/14/20  1306   CR 1.08     Ok to refill medication if creatinine is low        Medication is being filled for 1 time refill only due to:  Patient needs to be seen because it has been more than one year since last visit.       atenolol (TENORMIN) 50 MG tablet [Pharmacy Med Name: ATENOLOL 50 MG TABLET 50 TAB] 180 tablet 1     Sig: TAKE 1 TABLET (50 MG) BY MOUTH 2 TIMES DAILY   Last Written Prescription Date:  2/19/2020  Last Fill Quantity: 180,  # refills: 1   Last office visit: 9/11/2019 with prescribing provider:     Future Office Visit:        Beta-Blockers Protocol Failed - 9/12/2020 11:38 AM        Failed - Blood pressure under 140/90 in past 12 months     BP Readings from Last 3 Encounters:   09/11/19 138/72   06/28/19 128/64   03/29/19 " "142/64           Failed - Recent (12 mo) or future (30 days) visit within the authorizing provider's specialty     Patient has had an office visit with the authorizing provider or a provider within the authorizing providers department within the previous 12 mos or has a future within next 30 days. See \"Patient Info\" tab in inbasket, or \"Choose Columns\" in Meds & Orders section of the refill encounter.              Passed - Patient is age 6 or older        Passed - Medication is active on med list      Medication is being filled for 1 time refill only due to:  Patient needs to be seen because it has been more than one year since last visit.       triamcinolone (KENALOG) 0.1 % external cream [Pharmacy Med Name: TRIAMCINOLONE 0.1% CREAM 0.1 CRM] 454 g 1     Sig: APPLY TOPICALLY 2 TIMES DAILY ON BOTH LEGS FROM THE KNEES DOWN   Last Written Prescription Date:  4/8/2020  Last Fill Quantity: 454g,  # refills: 1   Last office visit: 9/11/2019 with prescribing provider:     Future Office Visit:      Topical Steroids and Nonsteroidals Protocol Failed - 9/12/2020 11:38 AM        Failed - Recent (12 mo) or future (30 days) visit within the authorizing provider's specialty     Patient has had an office visit with the authorizing provider or a provider within the authorizing providers department within the previous 12 mos or has a future within next 30 days. See \"Patient Info\" tab in inbasket, or \"Choose Columns\" in Meds & Orders section of the refill encounter.            Passed - Patient is age 6 or older        Passed - Authorizing prescriber's most recent note related to this medication read.     If refill request is for ophthalmic use, please forward request to provider for approval.          Passed - High potency steroid not ordered        Passed - Medication is active on med list         Medication is being filled for 1 time refill only due to:  Patient needs to be seen because it has been more than one year since last " visit.     Sending to scheduling for yearly office visit due  Shruti Avendaño RN

## 2020-09-15 RX ORDER — FLUOXETINE 10 MG/1
10 CAPSULE ORAL DAILY
Qty: 30 CAPSULE | Refills: 0 | Status: SHIPPED | OUTPATIENT
Start: 2020-09-15 | End: 2020-10-21

## 2020-09-15 NOTE — TELEPHONE ENCOUNTER
"  Requested Prescriptions   Pending Prescriptions Disp Refills     FLUoxetine (PROZAC) 10 MG capsule [Pharmacy Med Name: FLUoxetine HCL 10 MG CAPS 10 CAP] 90 capsule 8     Sig: TAKE ONE CAPSULE (10 MG) BY MOUTH DAILY   Last Written Prescription Date:  9/11/2019  Last Fill Quantity: 90,  # refills: 8   Last office visit: 9/11/2019 with prescribing provider:     Future Office Visit:        SSRIs Protocol Failed - 9/14/2020 10:18 AM        Failed - Recent (12 mo) or future (30 days) visit within the authorizing provider's specialty     Patient has had an office visit with the authorizing provider or a provider within the authorizing providers department within the previous 12 mos or has a future within next 30 days. See \"Patient Info\" tab in inbasket, or \"Choose Columns\" in Meds & Orders section of the refill encounter.              Passed - Medication is active on med list        Passed - Patient is age 18 or older      Medication is being filled for 1 time refill only due to:  Patient needs to be seen because it has been more than one year since last visit.     Sending to scheduling for yearly office visit.    Shruti Avendaño RN        "

## 2020-10-12 ENCOUNTER — DOCUMENTATION ONLY (OUTPATIENT)
Dept: FAMILY MEDICINE | Facility: CLINIC | Age: 85
End: 2020-10-12

## 2020-10-12 NOTE — PROGRESS NOTES
"\"[facility] now requests orders and shares plan of care/discharge summaries for some patients through [employer].  Please REPLY TO THIS MESSAGE OR ROUTE BACK TO THE AUTHOR in order to give authorization for orders when needed.  This is considered a verbal order, you will still receive a faxed copy of orders for signature.  Thank you for your assistance in improving collaboration for our patients.    ORDER for home care re-certification  HH  2 w 1  3 w 8  [facility] health aid for personal cares, bathing and light housekeeping.    HN  1 every 60 days 1  For direct supervision and re-certification.    Martin Macias -[phone]      "

## 2020-10-21 ENCOUNTER — MYC REFILL (OUTPATIENT)
Dept: FAMILY MEDICINE | Facility: CLINIC | Age: 85
End: 2020-10-21

## 2020-10-21 ENCOUNTER — MYC MEDICAL ADVICE (OUTPATIENT)
Dept: FAMILY MEDICINE | Facility: CLINIC | Age: 85
End: 2020-10-21

## 2020-10-21 DIAGNOSIS — N40.0 PROSTATE HYPERTROPHY: ICD-10-CM

## 2020-10-21 DIAGNOSIS — E78.5 HYPERLIPIDEMIA LDL GOAL <130: ICD-10-CM

## 2020-10-21 DIAGNOSIS — I10 ESSENTIAL HYPERTENSION WITH GOAL BLOOD PRESSURE LESS THAN 140/90: ICD-10-CM

## 2020-10-21 DIAGNOSIS — F41.9 ANXIETY: ICD-10-CM

## 2020-10-21 DIAGNOSIS — A04.8 H. PYLORI INFECTION: ICD-10-CM

## 2020-10-21 RX ORDER — ATORVASTATIN CALCIUM 20 MG/1
20 TABLET, FILM COATED ORAL DAILY
Qty: 90 TABLET | Refills: 3 | OUTPATIENT
Start: 2020-10-21

## 2020-10-21 RX ORDER — FINASTERIDE 5 MG/1
5 TABLET, FILM COATED ORAL DAILY
Qty: 90 TABLET | Refills: 1 | OUTPATIENT
Start: 2020-10-21

## 2020-10-21 RX ORDER — AMLODIPINE BESYLATE 2.5 MG/1
2.5 TABLET ORAL DAILY
Qty: 30 TABLET | Refills: 0 | Status: SHIPPED | OUTPATIENT
Start: 2020-10-21 | End: 2020-11-02

## 2020-10-21 RX ORDER — FLUOXETINE 10 MG/1
10 CAPSULE ORAL DAILY
Qty: 30 CAPSULE | Refills: 0 | Status: SHIPPED | OUTPATIENT
Start: 2020-10-21 | End: 2020-11-02

## 2020-10-21 RX ORDER — ATENOLOL 50 MG/1
50 TABLET ORAL 2 TIMES DAILY
Qty: 60 TABLET | Refills: 0 | Status: SHIPPED | OUTPATIENT
Start: 2020-10-21 | End: 2020-11-02

## 2020-10-21 NOTE — TELEPHONE ENCOUNTER
Patients daughter is not wanting him to be seen and they state he did labs and everything needed and that they poke with brennan about this last time

## 2020-10-22 NOTE — TELEPHONE ENCOUNTER
Patient is scheduled on 11/2/2020 at 1:00 pm with Dr. Chacko.    Mindy Barker, [facility]

## 2020-10-22 NOTE — TELEPHONE ENCOUNTER
Addressed in another note.  I previously thought I desired a phone visit but it was unnecessary for him to come to the clinic and leave his home isolation. Perhaps this was not made clear. Khoileigh Chacko MD

## 2020-10-22 NOTE — TELEPHONE ENCOUNTER
They will keep bothering him because he has not been seen for over a year.  Can we do a telephone visit with him so they stop bothering him.

## 2020-10-22 NOTE — TELEPHONE ENCOUNTER
Phone and possibly face to face requested via [employer] .  Maybe check to see if one is scheduled next week and if not call one the daughters to arrange one. Khoi Chacko MD

## 2020-11-01 ENCOUNTER — MYC MEDICAL ADVICE (OUTPATIENT)
Dept: FAMILY MEDICINE | Facility: CLINIC | Age: 85
End: 2020-11-01

## 2020-11-02 ENCOUNTER — VIRTUAL VISIT (OUTPATIENT)
Dept: FAMILY MEDICINE | Facility: CLINIC | Age: 85
End: 2020-11-02
Payer: COMMERCIAL

## 2020-11-02 DIAGNOSIS — Z00.00 ENCOUNTER FOR MEDICARE ANNUAL WELLNESS EXAM: Primary | ICD-10-CM

## 2020-11-02 DIAGNOSIS — E44.1 MILD PROTEIN-CALORIE MALNUTRITION (H): ICD-10-CM

## 2020-11-02 DIAGNOSIS — F41.9 ANXIETY: ICD-10-CM

## 2020-11-02 DIAGNOSIS — A04.8 H. PYLORI INFECTION: ICD-10-CM

## 2020-11-02 DIAGNOSIS — N40.0 PROSTATE HYPERTROPHY: ICD-10-CM

## 2020-11-02 DIAGNOSIS — I87.2 VENOUS STASIS DERMATITIS OF LEFT LOWER EXTREMITY: ICD-10-CM

## 2020-11-02 DIAGNOSIS — I71.40 ABDOMINAL AORTIC ANEURYSM (AAA) WITHOUT RUPTURE (H): ICD-10-CM

## 2020-11-02 DIAGNOSIS — I10 ESSENTIAL HYPERTENSION WITH GOAL BLOOD PRESSURE LESS THAN 140/90: ICD-10-CM

## 2020-11-02 PROCEDURE — 99397 PER PM REEVAL EST PAT 65+ YR: CPT | Mod: 95 | Performed by: FAMILY MEDICINE

## 2020-11-02 RX ORDER — AMLODIPINE BESYLATE 2.5 MG/1
2.5 TABLET ORAL DAILY
Qty: 90 TABLET | Refills: 3 | Status: SHIPPED | OUTPATIENT
Start: 2020-11-02 | End: 2021-02-17

## 2020-11-02 RX ORDER — MULTIVIT WITH MINERALS/LUTEIN
1000 TABLET ORAL DAILY
COMMUNITY
Start: 2020-11-02 | End: 2021-01-01

## 2020-11-02 RX ORDER — FLUOXETINE 10 MG/1
10 CAPSULE ORAL DAILY
Qty: 90 CAPSULE | Refills: 3 | Status: SHIPPED | OUTPATIENT
Start: 2020-11-02 | End: 2021-01-01

## 2020-11-02 RX ORDER — TRIAMCINOLONE ACETONIDE 1 MG/G
CREAM TOPICAL
Qty: 454 G | Refills: 0 | Status: SHIPPED | OUTPATIENT
Start: 2020-11-02 | End: 2020-11-19

## 2020-11-02 RX ORDER — FINASTERIDE 5 MG/1
5 TABLET, FILM COATED ORAL DAILY
Qty: 90 TABLET | Refills: 2 | Status: SHIPPED | OUTPATIENT
Start: 2020-11-02 | End: 2020-12-17

## 2020-11-02 RX ORDER — ATENOLOL 50 MG/1
50 TABLET ORAL 2 TIMES DAILY
Qty: 180 TABLET | Refills: 3 | Status: SHIPPED | OUTPATIENT
Start: 2020-11-02 | End: 2021-01-01

## 2020-11-02 RX ORDER — CYANOCOBALAMIN (VITAMIN B-12) 2500 MCG
2500 TABLET, SUBLINGUAL SUBLINGUAL DAILY
COMMUNITY
Start: 2020-11-02 | End: 2021-01-01

## 2020-11-02 NOTE — TELEPHONE ENCOUNTER
Noted.  ................Joe Yuen LPN,   November 2, 2020,      11:32 AM,   [facility]

## 2020-11-02 NOTE — PROGRESS NOTES
"Tre Fischer is a 89 year old male who is being evaluated via a billable video visit.      The patient has been notified of following:     \"This video visit will be conducted via a call between you and your physician/provider. We have found that certain health care needs can be provided without the need for an in-person physical exam.  This service lets us provide the care you need with a video conversation.  If a prescription is necessary we can send it directly to your pharmacy.  If lab work is needed we can place an order for that and you can then stop by our lab to have the test done at a later time.    Video visits are billed at different rates depending on your insurance coverage.  Please reach out to your insurance provider with any questions.    If during the course of the call the physician/provider feels a video visit is not appropriate, you will not be charged for this service.\"    Patient has given verbal consent for Video visit? Yes  How would you like to obtain your AVS? MyChart  If you are dropped from the video visit, the video invite should be resent to: Text to cell phone: [phone]  Will anyone else be joining your video visit? No      Subjective     Tre Fischer is a 89 year old male who presents today via video visit for the following health issues:    HPI     Hyperlipidemia Follow-Up      Are you regularly taking any medication or supplement to lower your cholesterol?   Yes- atorvastatin    Are you having muscle aches or other side effects that you think could be caused by your cholesterol lowering medication?  No    Hypertension Follow-up      Do you check your blood pressure regularly outside of the clinic? Yes     Are you following a low salt diet? No    Are your blood pressures ever more than 140 on the top number (systolic) OR more   than 90 on the bottom number (diastolic), for example 140/90? No    Anxiety Follow-Up    How are you doing with your anxiety since your last visit? " Improved patient lives at home with some home care, an aide, and the assistance of his children.  His wife  in May and she was living in a nursing home.  He has grief but he no longer needs to worry about her.    Are you having other symptoms that might be associated with anxiety? No    Have you had a significant life event? Grief or Loss     Are you feeling depressed? No    Do you have any concerns with your use of alcohol or other drugs? No    Social History     Tobacco Use     Smoking status: Former Smoker     Packs/day: 1.00     Years: 10.00     Pack years: 10.00     Types: Cigarettes     Quit date: 1972     Years since quittin.8     Smokeless tobacco: Never Used   Substance Use Topics     Alcohol use: No     Alcohol/week: 0.0 standard drinks     Drug use: No     PASCUAL-7 SCORE 2017   Total Score - - 1 (minimal anxiety)   Total Score 0 5 1     PHQ 2017   PHQ-9 Total Score 0 5   Q9: Thoughts of better off dead/self-harm past 2 weeks Not at all Not at all     Last PHQ-9 2019   1.  Little interest or pleasure in doing things 2   2.  Feeling down, depressed, or hopeless 0   3.  Trouble falling or staying asleep, or sleeping too much 0   4.  Feeling tired or having little energy 1   5.  Poor appetite or overeating 2   6.  Feeling bad about yourself 0   7.  Trouble concentrating 0   8.  Moving slowly or restless 0   Q9: Thoughts of better off dead/self-harm past 2 weeks 0   PHQ-9 Total Score 5   Difficulty at work, home, or with people -     PASCUAL-7  2019   1. Feeling nervous, anxious, or on edge 0   2. Not being able to stop or control worrying 0   3. Worrying too much about different things 0   4. Trouble relaxing 0   5. Being so restless that it is hard to sit still 0   6. Becoming easily annoyed or irritable 1   7. Feeling afraid, as if something awful might happen 0   PASCUAL-7 Total Score 1   If you checked any problems, how difficult have they made it for  you to do your work, take care of things at home, or get along with other people? -         How many days per week do you miss taking your medication? 0         Video Start Time: 1321    He gets up at night to urinate twice but can get back to sleep.  Fact he sleeps off and on all day every day.  He does take cetirizine routinely.  This is for his drippy nose.  He is not on any other sedative medications.  He takes fluoxetine which is helped reduce anxiety and depression.  He gets short of breath with some minimal activity at home but recovers quickly.  He does not have chest pain.  His legs are nonswollen.  He has no cough.  He states he feels weaker.  Family is wondering if we can stop aspirin or any other medication.  Patient has stasis dermatitis well controlled with medicated cream and over-the-counter cream.  His legs are not swelling much anymore at all.  He is not lightheaded.  Appetite is okay.  Patient has been losing weight but that is maintained and he had blood work in August that was unremarkable.  Patient has a walker which he uses and house has been evaluated for dangers contributing to possible falling  Review of Systems   Constitutional, HEENT, cardiovascular, pulmonary, gi and gu systems are negative, except as otherwise noted.      Objective    Vitals - Patient Reported  Weight (Patient Reported): 77.1 kg (170 lb)  Pulse (Patient Reported): 60  Pain Score: No Pain (0)        Physical Exam     GENERAL: Healthy, alert and no distress  SKIN: Visible skin clear. No significant rash, abnormal pigmentation or lesions.  PSYCH: Mentation appears normal, affect normal/bright, judgement and insight intact, normal speech and appearance well-groomed.  Mentation was clear.  He could answer questions about current events.  Did not officially test memory or clock drawing.  But at 89 years of age function seemed very adequate and family around him which included 2 daughters and 1 son who indicated he functions  well.      Minor changes on CBC and comprehensive profile done earlier this year        Assessment & Plan     Tre was seen today for recheck medication.    Diagnoses and all orders for this visit:    Encounter for Medicare annual wellness exam    Essential hypertension with goal blood pressure less than 140/90  -     amLODIPine (NORVASC) 2.5 MG tablet; Take 1 tablet (2.5 mg) by mouth daily  -     atenolol (TENORMIN) 50 MG tablet; Take 1 tablet (50 mg) by mouth 2 times daily    Prostate hypertrophy  -     finasteride (PROSCAR) 5 MG tablet; Take 1 tablet (5 mg) by mouth daily    Anxiety  -     FLUoxetine (PROZAC) 10 MG capsule; Take 1 capsule (10 mg) by mouth daily Office visit due    H. pylori infection  -     omeprazole (PRILOSEC) 20 MG DR capsule; Take 1 capsule (20 mg) by mouth 2 times daily    Venous stasis dermatitis of left lower extremity  -     triamcinolone (KENALOG) 0.1 % external cream; APPLY TOPICALLY 2 TIMES DAILY ON BOTH LEGS FROM THE KNEES DOWN as needed    Mild protein-calorie malnutrition (H)    Abdominal aortic aneurysm (AAA) without rupture (H)          Patient's age and condition, aggressive laboratory studies, visits, testing seem unnecessary.  He physically is doing well and family and home care keep track of him.  His abdominal aortic aneurysm requires no further assessment because any intervention at this point would be aggressive.  Regarding all other things as noted above, they are keeping those diseases and check with the medications being used.  Other symptoms and problems addressed elsewhere also in this note  MEDICATIONS:        - Discontinue aspirin       - Hold cetirizine to see if it helps with sleepiness       - Continue other medications without change  Regular exercise  See Patient Instructions    Return in about 6 months (around 5/2/2021) for Cardiac disease, BP Recheck, MSK problem.    Khoi Chacko MD  [facility]      Video-Visit Details    Type  of service:  Video Visit    Video End Time:[id number]    Originating Location (pt. Location): Home    Distant Location (provider location):  [facility]     Platform used for Video Visit: [place]

## 2020-11-02 NOTE — PATIENT INSTRUCTIONS
1.  Stop aspirin and hold cetirizine to see if tiredness improves.  If no symptom gets worse even if tiredness does not improve, no need to use cetirizine.  If his nasal droopiness becomes worse go ahead and use cetirizine again.  2  2.  Next follow-up could be another video visit between 6 and 12 months.  Do keep us informed and if he appears ill and especially runs a fever, contact our system for advice.  3.  I greatly appreciated the opportunity to be your caregiver and be involved with your family over the last almost 40 years.

## 2020-12-07 DIAGNOSIS — A04.8 H. PYLORI INFECTION: ICD-10-CM

## 2020-12-08 NOTE — TELEPHONE ENCOUNTER
"  Requested Prescriptions   Pending Prescriptions Disp Refills     omeprazole (PRILOSEC) 20 MG DR capsule [Pharmacy Med Name: OMEPRAZOLE DR 20 MG CAPSULE 20 Capsule] 60 capsule 0     Sig: TAKE ONE CAPSULE (20 MG) BY MOUTH 2 TIMES DAILY   11/2/2020    PPI Protocol Passed - 12/7/2020  4:55 PM        Passed - Not on Clopidogrel (unless Pantoprazole ordered)        Passed - No diagnosis of osteoporosis on record        Passed - Recent (12 mo) or future (30 days) visit within the authorizing provider's specialty     Patient has had an office visit with the authorizing provider or a provider within the authorizing providers department within the previous 12 mos or has a future within next 30 days. See \"Patient Info\" tab in inbasket, or \"Choose Columns\" in Meds & Orders section of the refill encounter.              Passed - Medication is active on med list        Passed - Patient is age 18 or older         Denied  Refills current  Sent to this pharmacy 11/2/2020 for 3 months and 3 refills  Shruti Avendaño RN      "

## 2020-12-09 ENCOUNTER — DOCUMENTATION ONLY (OUTPATIENT)
Dept: FAMILY MEDICINE | Facility: CLINIC | Age: 85
End: 2020-12-09

## 2020-12-09 NOTE — PROGRESS NOTES
"\"[facility] now requests orders and shares plan of care/discharge summaries for some patients through [employer].  Please REPLY TO THIS MESSAGE OR ROUTE BACK TO THE AUTHOR in order to give authorization for orders when needed.  This is considered a verbal order, you will still receive a faxed copy of orders for signature.  Thank you for your assistance in improving collaboration for our patients.    ORDER for home care re-certification  HN  1 every 60 days 1  For direct supervision and re-certification  HH  3 w 8  1 w 1  Hourly home health aid for personal cares, bathing and light housekeeping.  No changes in long term goals.      Martin Macias -[phone]      "

## 2020-12-17 ENCOUNTER — TELEPHONE (OUTPATIENT)
Dept: FAMILY MEDICINE | Facility: CLINIC | Age: 85
End: 2020-12-17

## 2020-12-17 ENCOUNTER — MYC MEDICAL ADVICE (OUTPATIENT)
Dept: FAMILY MEDICINE | Facility: CLINIC | Age: 85
End: 2020-12-17

## 2020-12-17 DIAGNOSIS — A04.8 H. PYLORI INFECTION: ICD-10-CM

## 2020-12-17 DIAGNOSIS — N40.0 PROSTATE HYPERTROPHY: ICD-10-CM

## 2020-12-17 RX ORDER — FINASTERIDE 5 MG/1
5 TABLET, FILM COATED ORAL DAILY
Qty: 90 TABLET | Refills: 2 | Status: SHIPPED | OUTPATIENT
Start: 2020-12-17 | End: 2021-01-01

## 2020-12-17 NOTE — TELEPHONE ENCOUNTER
Reason for Call:  Other prescription    Detailed comments: Pt's EC  calling for Pt recently went to  the Rx Finasteride and he was notifed his Provider withdrew and Pt was not given reason why for Pt notrmally has been prescribed mediation and would like a call back for further reason. She would also like to make sure that Pt's Omeprazole is prescribed for Pt to take twice a day for   90 days.    Phone Number Patient can be reached at: Home number on file [phone] (home)    Best Time: anytime    Can we leave a detailed message on this number? YES    Call taken on 12/17/2020 at 3:18 PM by Maikol Estevez

## 2021-01-01 ENCOUNTER — TELEPHONE (OUTPATIENT)
Dept: INTERNAL MEDICINE | Facility: CLINIC | Age: 86
End: 2021-01-01

## 2021-01-01 ENCOUNTER — HEALTH MAINTENANCE LETTER (OUTPATIENT)
Age: 86
End: 2021-01-01

## 2021-01-01 ENCOUNTER — OFFICE VISIT (OUTPATIENT)
Dept: INTERNAL MEDICINE | Facility: CLINIC | Age: 86
End: 2021-01-01
Payer: COMMERCIAL

## 2021-01-01 ENCOUNTER — HOSPITAL ENCOUNTER (OUTPATIENT)
Dept: CT IMAGING | Facility: CLINIC | Age: 86
Discharge: HOME | End: 2021-07-06
Attending: INTERNAL MEDICINE | Admitting: INTERNAL MEDICINE
Payer: COMMERCIAL

## 2021-01-01 ENCOUNTER — TELEPHONE (OUTPATIENT)
Dept: INTERNAL MEDICINE | Facility: CLINIC | Age: 86
End: 2021-01-01
Payer: COMMERCIAL

## 2021-01-01 ENCOUNTER — DOCUMENTATION ONLY (OUTPATIENT)
Dept: CARE COORDINATION | Facility: CLINIC | Age: 86
End: 2021-01-01

## 2021-01-01 ENCOUNTER — APPOINTMENT (OUTPATIENT)
Dept: GENERAL RADIOLOGY | Facility: CLINIC | Age: 86
End: 2021-01-01
Attending: FAMILY MEDICINE
Payer: COMMERCIAL

## 2021-01-01 ENCOUNTER — ALLIED HEALTH/NURSE VISIT (OUTPATIENT)
Dept: AUDIOLOGY | Facility: CLINIC | Age: 86
End: 2021-01-01
Payer: COMMERCIAL

## 2021-01-01 ENCOUNTER — OFFICE VISIT (OUTPATIENT)
Dept: AUDIOLOGY | Facility: CLINIC | Age: 86
End: 2021-01-01
Payer: COMMERCIAL

## 2021-01-01 ENCOUNTER — OFFICE VISIT (OUTPATIENT)
Dept: OTOLARYNGOLOGY | Facility: CLINIC | Age: 86
End: 2021-01-01
Payer: COMMERCIAL

## 2021-01-01 ENCOUNTER — HOSPITAL ENCOUNTER (EMERGENCY)
Facility: CLINIC | Age: 86
Discharge: HOME | End: 2021-11-22
Attending: FAMILY MEDICINE | Admitting: FAMILY MEDICINE
Payer: COMMERCIAL

## 2021-01-01 ENCOUNTER — MYC MEDICAL ADVICE (OUTPATIENT)
Dept: INTERNAL MEDICINE | Facility: CLINIC | Age: 86
End: 2021-01-01

## 2021-01-01 ENCOUNTER — DOCUMENTATION ONLY (OUTPATIENT)
Dept: INTERNAL MEDICINE | Facility: CLINIC | Age: 86
End: 2021-01-01

## 2021-01-01 VITALS
HEIGHT: 68 IN | WEIGHT: 188.31 LBS | SYSTOLIC BLOOD PRESSURE: 136 MMHG | TEMPERATURE: 97.5 F | DIASTOLIC BLOOD PRESSURE: 86 MMHG | BODY MASS INDEX: 28.54 KG/M2

## 2021-01-01 VITALS
BODY MASS INDEX: 29.16 KG/M2 | SYSTOLIC BLOOD PRESSURE: 153 MMHG | WEIGHT: 175 LBS | RESPIRATION RATE: 18 BRPM | TEMPERATURE: 98.3 F | OXYGEN SATURATION: 96 % | HEART RATE: 66 BPM | DIASTOLIC BLOOD PRESSURE: 70 MMHG | HEIGHT: 65 IN

## 2021-01-01 VITALS
HEART RATE: 65 BPM | DIASTOLIC BLOOD PRESSURE: 58 MMHG | SYSTOLIC BLOOD PRESSURE: 138 MMHG | BODY MASS INDEX: 32.12 KG/M2 | TEMPERATURE: 98.5 F | RESPIRATION RATE: 8 BRPM | OXYGEN SATURATION: 95 % | WEIGHT: 193 LBS

## 2021-01-01 VITALS
BODY MASS INDEX: 28.89 KG/M2 | HEART RATE: 64 BPM | DIASTOLIC BLOOD PRESSURE: 84 MMHG | TEMPERATURE: 97.8 F | WEIGHT: 190 LBS | SYSTOLIC BLOOD PRESSURE: 152 MMHG | RESPIRATION RATE: 20 BRPM | OXYGEN SATURATION: 98 %

## 2021-01-01 DIAGNOSIS — J44.1 COPD EXACERBATION (H): ICD-10-CM

## 2021-01-01 DIAGNOSIS — Z53.9 DIAGNOSIS NOT YET DEFINED: Primary | ICD-10-CM

## 2021-01-01 DIAGNOSIS — J44.9 CHRONIC OBSTRUCTIVE PULMONARY DISEASE, UNSPECIFIED COPD TYPE (H): ICD-10-CM

## 2021-01-01 DIAGNOSIS — I10 ESSENTIAL HYPERTENSION WITH GOAL BLOOD PRESSURE LESS THAN 140/90: ICD-10-CM

## 2021-01-01 DIAGNOSIS — I71.40 ABDOMINAL AORTIC ANEURYSM (AAA) WITHOUT RUPTURE (H): ICD-10-CM

## 2021-01-01 DIAGNOSIS — H90.3 SENSORINEURAL HEARING LOSS, BILATERAL: Primary | ICD-10-CM

## 2021-01-01 DIAGNOSIS — N40.0 PROSTATE HYPERTROPHY: ICD-10-CM

## 2021-01-01 DIAGNOSIS — Z23 NEED FOR PROPHYLACTIC VACCINATION AND INOCULATION AGAINST INFLUENZA: Primary | ICD-10-CM

## 2021-01-01 DIAGNOSIS — I87.2 VENOUS STASIS DERMATITIS OF LEFT LOWER EXTREMITY: ICD-10-CM

## 2021-01-01 DIAGNOSIS — R35.1 NOCTURIA: ICD-10-CM

## 2021-01-01 DIAGNOSIS — J31.0 CHRONIC RHINITIS: ICD-10-CM

## 2021-01-01 DIAGNOSIS — H61.22 IMPACTED CERUMEN OF LEFT EAR: Primary | ICD-10-CM

## 2021-01-01 DIAGNOSIS — R53.81 PHYSICAL DECONDITIONING: ICD-10-CM

## 2021-01-01 DIAGNOSIS — G47.9 TROUBLE IN SLEEPING: ICD-10-CM

## 2021-01-01 DIAGNOSIS — E78.5 HYPERLIPIDEMIA LDL GOAL <130: ICD-10-CM

## 2021-01-01 DIAGNOSIS — J20.9 ACUTE BRONCHITIS, UNSPECIFIED ORGANISM: ICD-10-CM

## 2021-01-01 DIAGNOSIS — R05.9 COUGH: ICD-10-CM

## 2021-01-01 DIAGNOSIS — M19.019 OSTEOARTHRITIS OF SHOULDER REGION: ICD-10-CM

## 2021-01-01 DIAGNOSIS — J44.9 CHRONIC OBSTRUCTIVE PULMONARY DISEASE, UNSPECIFIED COPD TYPE (H): Primary | ICD-10-CM

## 2021-01-01 DIAGNOSIS — R53.83 FATIGUE, UNSPECIFIED TYPE: ICD-10-CM

## 2021-01-01 DIAGNOSIS — R09.89 PULSE IRREGULARITY: Primary | ICD-10-CM

## 2021-01-01 DIAGNOSIS — D64.9 ANEMIA, UNSPECIFIED TYPE: ICD-10-CM

## 2021-01-01 DIAGNOSIS — F41.9 ANXIETY: ICD-10-CM

## 2021-01-01 DIAGNOSIS — K59.00 CONSTIPATION: Primary | ICD-10-CM

## 2021-01-01 DIAGNOSIS — A04.8 H. PYLORI INFECTION: ICD-10-CM

## 2021-01-01 LAB
ALBUMIN SERPL-MCNC: 2.7 G/DL (ref 3.4–5)
ALBUMIN SERPL-MCNC: 3.4 G/DL (ref 3.4–5)
ALP SERPL-CCNC: 138 U/L (ref 40–150)
ALP SERPL-CCNC: 138 U/L (ref 40–150)
ALT SERPL W P-5'-P-CCNC: 15 U/L (ref 0–70)
ALT SERPL W P-5'-P-CCNC: 24 U/L (ref 0–70)
ANION GAP SERPL CALCULATED.3IONS-SCNC: 2 MMOL/L (ref 3–14)
ANION GAP SERPL CALCULATED.3IONS-SCNC: 4 MMOL/L (ref 3–14)
AST SERPL W P-5'-P-CCNC: 14 U/L (ref 0–45)
AST SERPL W P-5'-P-CCNC: 20 U/L (ref 0–45)
BASE EXCESS BLDV CALC-SCNC: 1.7 MMOL/L (ref -7.7–1.9)
BASOPHILS # BLD AUTO: 0 10E3/UL (ref 0–0.2)
BASOPHILS # BLD MANUAL: 0 10E3/UL (ref 0–0.2)
BASOPHILS NFR BLD AUTO: 0 %
BASOPHILS NFR BLD MANUAL: 0 %
BILIRUB SERPL-MCNC: 0.3 MG/DL (ref 0.2–1.3)
BILIRUB SERPL-MCNC: 0.4 MG/DL (ref 0.2–1.3)
BUN SERPL-MCNC: 15 MG/DL (ref 7–30)
BUN SERPL-MCNC: 21 MG/DL (ref 7–30)
CALCIUM SERPL-MCNC: 8.1 MG/DL (ref 8.5–10.1)
CALCIUM SERPL-MCNC: 8.2 MG/DL (ref 8.5–10.1)
CHLORIDE BLD-SCNC: 112 MMOL/L (ref 94–109)
CHLORIDE SERPL-SCNC: 103 MMOL/L (ref 94–109)
CK SERPL-CCNC: 57 U/L (ref 30–300)
CO2 SERPL-SCNC: 27 MMOL/L (ref 20–32)
CO2 SERPL-SCNC: 28 MMOL/L (ref 20–32)
CREAT SERPL-MCNC: 0.94 MG/DL (ref 0.66–1.25)
CREAT SERPL-MCNC: 1.11 MG/DL (ref 0.66–1.25)
CRP SERPL-MCNC: 72.7 MG/L (ref 0–8)
EOSINOPHIL # BLD AUTO: 0 10E3/UL (ref 0–0.7)
EOSINOPHIL # BLD MANUAL: 0 10E3/UL (ref 0–0.7)
EOSINOPHIL NFR BLD AUTO: 0 %
EOSINOPHIL NFR BLD MANUAL: 0 %
ERYTHROCYTE [DISTWIDTH] IN BLOOD BY AUTOMATED COUNT: 14.9 % (ref 10–15)
ERYTHROCYTE [DISTWIDTH] IN BLOOD BY AUTOMATED COUNT: 15.1 % (ref 10–15)
ERYTHROCYTE [DISTWIDTH] IN BLOOD BY AUTOMATED COUNT: 15.2 % (ref 10–15)
FLUAV RNA SPEC QL NAA+PROBE: NEGATIVE
FLUBV RNA RESP QL NAA+PROBE: NEGATIVE
GFR SERPL CREATININE-BSD FRML MDRD: 58 ML/MIN/{1.73_M2}
GFR SERPL CREATININE-BSD FRML MDRD: 71 ML/MIN/1.73M2
GLUCOSE BLD-MCNC: 123 MG/DL (ref 70–99)
GLUCOSE SERPL-MCNC: 128 MG/DL (ref 70–99)
HCO3 BLDV-SCNC: 27 MMOL/L (ref 21–28)
HCT VFR BLD AUTO: 31.7 % (ref 40–53)
HCT VFR BLD AUTO: 32.5 % (ref 40–53)
HCT VFR BLD AUTO: 37.1 % (ref 40–53)
HGB BLD-MCNC: 10.2 G/DL (ref 13.3–17.7)
HGB BLD-MCNC: 10.5 G/DL (ref 13.3–17.7)
HGB BLD-MCNC: 12 G/DL (ref 13.3–17.7)
HOLD SPECIMEN: NORMAL
IMM GRANULOCYTES # BLD: 0.1 10E3/UL
IMM GRANULOCYTES NFR BLD: 1 %
IRON SATN MFR SERPL: 24 % (ref 15–46)
IRON SERPL-MCNC: 55 UG/DL (ref 35–180)
LACTATE SERPL-SCNC: 0.8 MMOL/L (ref 0.7–2)
LYMPHOCYTES # BLD AUTO: 1 10E3/UL (ref 0.8–5.3)
LYMPHOCYTES # BLD MANUAL: 1.7 10E3/UL (ref 0.8–5.3)
LYMPHOCYTES NFR BLD AUTO: 11 %
LYMPHOCYTES NFR BLD MANUAL: 13 %
MAGNESIUM SERPL-MCNC: 1.8 MG/DL (ref 1.6–2.3)
MCH RBC QN AUTO: 28 PG (ref 26.5–33)
MCH RBC QN AUTO: 28.3 PG (ref 26.5–33)
MCH RBC QN AUTO: 28.4 PG (ref 26.5–33)
MCHC RBC AUTO-ENTMCNC: 32.2 G/DL (ref 31.5–36.5)
MCHC RBC AUTO-ENTMCNC: 32.3 G/DL (ref 31.5–36.5)
MCHC RBC AUTO-ENTMCNC: 32.3 G/DL (ref 31.5–36.5)
MCV RBC AUTO: 87 FL (ref 78–100)
MCV RBC AUTO: 88 FL (ref 78–100)
MCV RBC AUTO: 88 FL (ref 78–100)
MONOCYTES # BLD AUTO: 0.6 10E3/UL (ref 0–1.3)
MONOCYTES # BLD MANUAL: 0.3 10E3/UL (ref 0–1.3)
MONOCYTES NFR BLD AUTO: 6 %
MONOCYTES NFR BLD MANUAL: 2 %
NEUTROPHILS # BLD AUTO: 7.8 10E3/UL (ref 1.6–8.3)
NEUTROPHILS # BLD MANUAL: 11 10E3/UL (ref 1.6–8.3)
NEUTROPHILS NFR BLD AUTO: 82 %
NEUTROPHILS NFR BLD MANUAL: 85 %
NRBC # BLD AUTO: 0 10E3/UL
NRBC BLD AUTO-RTO: 0 /100
O2/TOTAL GAS SETTING VFR VENT: 21 %
PCO2 BLDV: 47 MM HG (ref 40–50)
PH BLDV: 7.37 [PH] (ref 7.32–7.43)
PLAT MORPH BLD: ABNORMAL
PLATELET # BLD AUTO: 178 10E3/UL (ref 150–450)
PLATELET # BLD AUTO: 189 10E9/L (ref 150–450)
PLATELET # BLD AUTO: 194 10E3/UL (ref 150–450)
PO2 BLDV: 39 MM HG (ref 25–47)
POTASSIUM BLD-SCNC: 3.6 MMOL/L (ref 3.4–5.3)
POTASSIUM SERPL-SCNC: 4.1 MMOL/L (ref 3.4–5.3)
PROT SERPL-MCNC: 6.3 G/DL (ref 6.8–8.8)
PROT SERPL-MCNC: 6.6 G/DL (ref 6.8–8.8)
RBC # BLD AUTO: 3.61 10E6/UL (ref 4.4–5.9)
RBC # BLD AUTO: 3.7 10E6/UL (ref 4.4–5.9)
RBC # BLD AUTO: 4.28 10E12/L (ref 4.4–5.9)
RBC MORPH BLD: ABNORMAL
RETICS # AUTO: 0.08 10E6/UL (ref 0.03–0.1)
RETICS/RBC NFR AUTO: 2.2 % (ref 0.5–2)
SARS-COV-2 RNA RESP QL NAA+PROBE: NEGATIVE
SODIUM SERPL-SCNC: 135 MMOL/L (ref 133–144)
SODIUM SERPL-SCNC: 141 MMOL/L (ref 133–144)
TIBC SERPL-MCNC: 231 UG/DL (ref 240–430)
TROPONIN I SERPL-MCNC: <0.015 UG/L (ref 0–0.04)
TSH SERPL DL<=0.005 MIU/L-ACNC: 1.53 MU/L (ref 0.4–4)
VIT B12 SERPL-MCNC: 672 PG/ML (ref 193–986)
WBC # BLD AUTO: 12.9 10E3/UL (ref 4–11)
WBC # BLD AUTO: 8.7 10E9/L (ref 4–11)
WBC # BLD AUTO: 9.6 10E3/UL (ref 4–11)

## 2021-01-01 PROCEDURE — 250N000011 HC RX IP 250 OP 636: Performed by: FAMILY MEDICINE

## 2021-01-01 PROCEDURE — 99214 OFFICE O/P EST MOD 30 MIN: CPT | Mod: 25 | Performed by: INTERNAL MEDICINE

## 2021-01-01 PROCEDURE — V5299 HEARING SERVICE: HCPCS | Performed by: AUDIOLOGIST

## 2021-01-01 PROCEDURE — 96375 TX/PRO/DX INJ NEW DRUG ADDON: CPT | Performed by: FAMILY MEDICINE

## 2021-01-01 PROCEDURE — 94640 AIRWAY INHALATION TREATMENT: CPT | Performed by: FAMILY MEDICINE

## 2021-01-01 PROCEDURE — 96367 TX/PROPH/DG ADDL SEQ IV INF: CPT | Performed by: FAMILY MEDICINE

## 2021-01-01 PROCEDURE — 71045 X-RAY EXAM CHEST 1 VIEW: CPT

## 2021-01-01 PROCEDURE — 99284 EMERGENCY DEPT VISIT MOD MDM: CPT | Performed by: FAMILY MEDICINE

## 2021-01-01 PROCEDURE — G0008 ADMIN INFLUENZA VIRUS VAC: HCPCS | Performed by: INTERNAL MEDICINE

## 2021-01-01 PROCEDURE — 99284 EMERGENCY DEPT VISIT MOD MDM: CPT | Mod: 25 | Performed by: FAMILY MEDICINE

## 2021-01-01 PROCEDURE — 71250 CT THORAX DX C-: CPT

## 2021-01-01 PROCEDURE — V5264 EAR MOLD/INSERT: HCPCS | Mod: GA | Performed by: AUDIOLOGIST

## 2021-01-01 PROCEDURE — G0179 MD RECERTIFICATION HHA PT: HCPCS | Performed by: INTERNAL MEDICINE

## 2021-01-01 PROCEDURE — 36415 COLL VENOUS BLD VENIPUNCTURE: CPT | Performed by: INTERNAL MEDICINE

## 2021-01-01 PROCEDURE — 99207 PR MD RECERTIFICATION HHA PT: CPT | Performed by: INTERNAL MEDICINE

## 2021-01-01 PROCEDURE — 83735 ASSAY OF MAGNESIUM: CPT | Performed by: FAMILY MEDICINE

## 2021-01-01 PROCEDURE — 99214 OFFICE O/P EST MOD 30 MIN: CPT | Performed by: INTERNAL MEDICINE

## 2021-01-01 PROCEDURE — 80053 COMPREHEN METABOLIC PANEL: CPT | Performed by: INTERNAL MEDICINE

## 2021-01-01 PROCEDURE — 83550 IRON BINDING TEST: CPT | Performed by: INTERNAL MEDICINE

## 2021-01-01 PROCEDURE — 83605 ASSAY OF LACTIC ACID: CPT | Performed by: FAMILY MEDICINE

## 2021-01-01 PROCEDURE — 93000 ELECTROCARDIOGRAM COMPLETE: CPT | Performed by: INTERNAL MEDICINE

## 2021-01-01 PROCEDURE — 36415 COLL VENOUS BLD VENIPUNCTURE: CPT | Performed by: FAMILY MEDICINE

## 2021-01-01 PROCEDURE — 99207 PR NO CHARGE LOS: CPT | Performed by: AUDIOLOGIST

## 2021-01-01 PROCEDURE — V5275 EAR IMPRESSION: HCPCS | Mod: RT | Performed by: AUDIOLOGIST

## 2021-01-01 PROCEDURE — C9803 HOPD COVID-19 SPEC COLLECT: HCPCS | Performed by: FAMILY MEDICINE

## 2021-01-01 PROCEDURE — 85045 AUTOMATED RETICULOCYTE COUNT: CPT | Performed by: INTERNAL MEDICINE

## 2021-01-01 PROCEDURE — 86140 C-REACTIVE PROTEIN: CPT | Performed by: FAMILY MEDICINE

## 2021-01-01 PROCEDURE — 85027 COMPLETE CBC AUTOMATED: CPT | Performed by: INTERNAL MEDICINE

## 2021-01-01 PROCEDURE — 90662 IIV NO PRSV INCREASED AG IM: CPT | Performed by: INTERNAL MEDICINE

## 2021-01-01 PROCEDURE — 82803 BLOOD GASES ANY COMBINATION: CPT | Performed by: FAMILY MEDICINE

## 2021-01-01 PROCEDURE — V5010 ASSESSMENT FOR HEARING AID: HCPCS | Performed by: AUDIOLOGIST

## 2021-01-01 PROCEDURE — 84443 ASSAY THYROID STIM HORMONE: CPT | Performed by: INTERNAL MEDICINE

## 2021-01-01 PROCEDURE — 82607 VITAMIN B-12: CPT | Performed by: INTERNAL MEDICINE

## 2021-01-01 PROCEDURE — 69210 REMOVE IMPACTED EAR WAX UNI: CPT | Performed by: OTOLARYNGOLOGY

## 2021-01-01 PROCEDURE — 96365 THER/PROPH/DIAG IV INF INIT: CPT | Performed by: FAMILY MEDICINE

## 2021-01-01 PROCEDURE — V5014 HEARING AID REPAIR/MODIFYING: HCPCS | Mod: GA | Performed by: AUDIOLOGIST

## 2021-01-01 PROCEDURE — 82040 ASSAY OF SERUM ALBUMIN: CPT | Performed by: FAMILY MEDICINE

## 2021-01-01 PROCEDURE — 87636 SARSCOV2 & INF A&B AMP PRB: CPT | Performed by: FAMILY MEDICINE

## 2021-01-01 PROCEDURE — 82550 ASSAY OF CK (CPK): CPT | Performed by: INTERNAL MEDICINE

## 2021-01-01 PROCEDURE — 250N000009 HC RX 250: Performed by: FAMILY MEDICINE

## 2021-01-01 PROCEDURE — 85004 AUTOMATED DIFF WBC COUNT: CPT | Performed by: FAMILY MEDICINE

## 2021-01-01 PROCEDURE — 84484 ASSAY OF TROPONIN QUANT: CPT | Performed by: FAMILY MEDICINE

## 2021-01-01 RX ORDER — IPRATROPIUM BROMIDE AND ALBUTEROL SULFATE 2.5; .5 MG/3ML; MG/3ML
1 SOLUTION RESPIRATORY (INHALATION) EVERY 6 HOURS PRN
Qty: 75 ML | Refills: 0 | Status: SHIPPED | OUTPATIENT
Start: 2021-01-01 | End: 2021-01-01

## 2021-01-01 RX ORDER — DEXAMETHASONE SODIUM PHOSPHATE 10 MG/ML
6 INJECTION, SOLUTION INTRAMUSCULAR; INTRAVENOUS ONCE
Status: COMPLETED | OUTPATIENT
Start: 2021-01-01 | End: 2021-01-01

## 2021-01-01 RX ORDER — CHOLECALCIFEROL (VITAMIN D3) 50 MCG
25 TABLET ORAL DAILY
Qty: 100 TABLET | Refills: 3 | COMMUNITY
Start: 2021-01-01

## 2021-01-01 RX ORDER — FLUOXETINE 10 MG/1
20 CAPSULE ORAL DAILY
Qty: 180 CAPSULE | Refills: 3 | Status: SHIPPED | OUTPATIENT
Start: 2021-01-01 | End: 2021-01-01

## 2021-01-01 RX ORDER — AZITHROMYCIN 500 MG/1
500 INJECTION, POWDER, LYOPHILIZED, FOR SOLUTION INTRAVENOUS DAILY
Status: DISCONTINUED | OUTPATIENT
Start: 2021-01-01 | End: 2021-01-01 | Stop reason: HOSPADM

## 2021-01-01 RX ORDER — AZITHROMYCIN 250 MG/1
TABLET, FILM COATED ORAL
Qty: 4 TABLET | Refills: 0 | Status: SHIPPED | OUTPATIENT
Start: 2021-01-01 | End: 2021-01-01

## 2021-01-01 RX ORDER — TRIAMCINOLONE ACETONIDE 1 MG/G
CREAM TOPICAL
Qty: 454 G | Refills: 0 | Status: SHIPPED | OUTPATIENT
Start: 2021-01-01 | End: 2021-01-01

## 2021-01-01 RX ORDER — TAMSULOSIN HYDROCHLORIDE 0.4 MG/1
0.4 CAPSULE ORAL DAILY
Qty: 60 CAPSULE | Refills: 3 | Status: SHIPPED | OUTPATIENT
Start: 2021-01-01

## 2021-01-01 RX ORDER — ATORVASTATIN CALCIUM 20 MG/1
20 TABLET, FILM COATED ORAL DAILY
Qty: 90 TABLET | Refills: 1 | Status: SHIPPED | OUTPATIENT
Start: 2021-01-01 | End: 2021-01-01

## 2021-01-01 RX ORDER — IPRATROPIUM BROMIDE AND ALBUTEROL SULFATE 2.5; .5 MG/3ML; MG/3ML
SOLUTION RESPIRATORY (INHALATION)
Qty: 270 ML | Refills: 0 | Status: SHIPPED | OUTPATIENT
Start: 2021-01-01 | End: 2021-01-01

## 2021-01-01 RX ORDER — AZELASTINE 1 MG/ML
SPRAY, METERED NASAL
Qty: 30 ML | Refills: 7 | Status: SHIPPED | OUTPATIENT
Start: 2021-01-01

## 2021-01-01 RX ORDER — TAMSULOSIN HYDROCHLORIDE 0.4 MG/1
0.4 CAPSULE ORAL DAILY
Qty: 30 CAPSULE | Refills: 3 | Status: SHIPPED | OUTPATIENT
Start: 2021-01-01 | End: 2021-01-01

## 2021-01-01 RX ORDER — IPRATROPIUM BROMIDE AND ALBUTEROL SULFATE 2.5; .5 MG/3ML; MG/3ML
1 SOLUTION RESPIRATORY (INHALATION) EVERY 6 HOURS
Qty: 300 ML | Refills: 0 | Status: SHIPPED | OUTPATIENT
Start: 2021-01-01 | End: 2022-01-01

## 2021-01-01 RX ORDER — CEFTRIAXONE 1 G/1
1 INJECTION, POWDER, FOR SOLUTION INTRAMUSCULAR; INTRAVENOUS ONCE
Status: COMPLETED | OUTPATIENT
Start: 2021-01-01 | End: 2021-01-01

## 2021-01-01 RX ORDER — CETIRIZINE HYDROCHLORIDE 10 MG/1
10 TABLET ORAL DAILY
COMMUNITY

## 2021-01-01 RX ORDER — DOCUSATE SODIUM 100 MG/1
100 CAPSULE, LIQUID FILLED ORAL DAILY
COMMUNITY
Start: 2021-01-01

## 2021-01-01 RX ORDER — DEXAMETHASONE 6 MG/1
TABLET ORAL
Qty: 4 TABLET | Refills: 0 | Status: SHIPPED | OUTPATIENT
Start: 2021-01-01 | End: 2021-01-01

## 2021-01-01 RX ORDER — GUAIFENESIN, PSEUDOEPHEDRINE HYDROCHLORIDE 600; 60 MG/1; MG/1
1 TABLET, EXTENDED RELEASE ORAL EVERY 12 HOURS
COMMUNITY
Start: 2021-01-01

## 2021-01-01 RX ORDER — GUAIFENESIN AND DEXTROMETHORPHAN HYDROBROMIDE 600; 30 MG/1; MG/1
1 TABLET, EXTENDED RELEASE ORAL EVERY 12 HOURS
COMMUNITY
End: 2021-01-01

## 2021-01-01 RX ORDER — IPRATROPIUM BROMIDE AND ALBUTEROL SULFATE 2.5; .5 MG/3ML; MG/3ML
3 SOLUTION RESPIRATORY (INHALATION) ONCE
Status: COMPLETED | OUTPATIENT
Start: 2021-01-01 | End: 2021-01-01

## 2021-01-01 RX ORDER — AMLODIPINE BESYLATE 2.5 MG/1
2.5 TABLET ORAL
COMMUNITY
Start: 2021-01-01

## 2021-01-01 RX ORDER — DEXAMETHASONE 2 MG/1
TABLET ORAL
COMMUNITY
Start: 2021-01-01 | End: 2021-01-01

## 2021-01-01 RX ORDER — IPRATROPIUM BROMIDE AND ALBUTEROL SULFATE 2.5; .5 MG/3ML; MG/3ML
1 SOLUTION RESPIRATORY (INHALATION) EVERY 6 HOURS PRN
Qty: 300 ML | Refills: 1 | Status: SHIPPED | OUTPATIENT
Start: 2021-01-01 | End: 2021-01-01

## 2021-01-01 RX ORDER — FINASTERIDE 5 MG/1
5 TABLET, FILM COATED ORAL DAILY
Qty: 30 TABLET | Refills: 5 | Status: SHIPPED | OUTPATIENT
Start: 2021-01-01 | End: 2022-01-01

## 2021-01-01 RX ORDER — ATENOLOL 50 MG/1
50 TABLET ORAL 2 TIMES DAILY
Qty: 180 TABLET | Refills: 1 | Status: SHIPPED | OUTPATIENT
Start: 2021-01-01

## 2021-01-01 RX ORDER — TRIAMCINOLONE ACETONIDE 1 MG/G
CREAM TOPICAL
Qty: 454 G | Refills: 1 | Status: SHIPPED | OUTPATIENT
Start: 2021-01-01 | End: 2022-03-07

## 2021-01-01 RX ORDER — ATORVASTATIN CALCIUM 20 MG/1
20 TABLET, FILM COATED ORAL DAILY
Qty: 90 TABLET | Refills: 1 | Status: SHIPPED | OUTPATIENT
Start: 2021-01-01

## 2021-01-01 RX ORDER — LOSARTAN POTASSIUM 50 MG/1
50 TABLET ORAL 2 TIMES DAILY
Qty: 180 TABLET | Refills: 3 | Status: SHIPPED | OUTPATIENT
Start: 2021-01-01

## 2021-01-01 RX ADMIN — AZITHROMYCIN MONOHYDRATE 500 MG: 500 INJECTION, POWDER, LYOPHILIZED, FOR SOLUTION INTRAVENOUS at 11:13

## 2021-01-01 RX ADMIN — DEXAMETHASONE SODIUM PHOSPHATE 6 MG: 10 INJECTION INTRAMUSCULAR; INTRAVENOUS at 09:49

## 2021-01-01 RX ADMIN — IPRATROPIUM BROMIDE AND ALBUTEROL SULFATE 3 ML: 2.5; .5 SOLUTION RESPIRATORY (INHALATION) at 10:42

## 2021-01-01 RX ADMIN — CEFTRIAXONE 1 G: 1 INJECTION, POWDER, FOR SOLUTION INTRAMUSCULAR; INTRAVENOUS at 10:53

## 2021-01-01 ASSESSMENT — PAIN SCALES - GENERAL
PAINLEVEL: NO PAIN (0)
PAINLEVEL: NO PAIN (0)

## 2021-01-01 ASSESSMENT — MIFFLIN-ST. JEOR
SCORE: 1488.68
SCORE: 1375.67

## 2021-01-09 ENCOUNTER — HEALTH MAINTENANCE LETTER (OUTPATIENT)
Age: 86
End: 2021-01-09

## 2021-01-19 ENCOUNTER — OFFICE VISIT (OUTPATIENT)
Dept: AUDIOLOGY | Facility: CLINIC | Age: 86
End: 2021-01-19
Payer: COMMERCIAL

## 2021-01-19 DIAGNOSIS — H90.3 SENSORINEURAL HEARING LOSS, BILATERAL: Primary | ICD-10-CM

## 2021-01-19 PROCEDURE — 99207 PR NO CHARGE LOS: CPT | Performed by: AUDIOLOGIST

## 2021-01-19 PROCEDURE — V5299 HEARING SERVICE: HCPCS | Performed by: AUDIOLOGIST

## 2021-01-19 NOTE — PROGRESS NOTES
Hearing Aid Check     SUBJECTIVE:  Tre Fischer is a 89 year old year old male, seen today for hearing aid checkt, at [facility]. Patient reports intermittent function from the right device for the last few weeks, and questions wax in the left ear.  He is currently using binaural Gordon 3 Series i110 LAUREN 312d, with custom canal earmolds.    OBJECTIVE:  Otoscopic exam indicates moderate non-occluding wax right ear removed without incident using a lighted curette (some wax remains deep in the ear canal near the tympanic membrane removal, was not attempted) and moderate non-occluding wax left ear.  Wax was dark and looked hard and dried, near the canal floor the wax was lighter in color and otitis extern could not be ruled out.  Attempted removal but wax was stuck.    Hearing aid maintenance was completed to clean external components, remove canal molds and clean. Cleaned receivers and removed wax filters, vacuumed behind filter, and replaced wax filter.  Vacuumed yrn ports. Biologic listening check revealed appropriate function of both hearing aids. Patient reported improved function right.     Patient requested replacement wax filters, provided 4 and patient paid using Non-[employer] process.     PLAN:   Recommended ENT consult for ear cleaning left and right, and for ear exam left.  If he has any problems with the hearing aids recommended follow up as needed.    Morelia Lewis.  [place] Licensed Audiologist #[id number]

## 2021-01-26 NOTE — PROGRESS NOTES
ENT Consultation    Tre Fischer who is a 89 year old male seen in consultation at the request of self.      History of Present Illness - Tre Fischer is a 89 year old male with a long history of hearing loss wears hearing aids feels like his left ear is plugged.  In the significant cerumen impaction was discovered by audiology.  Indeed      Body mass index is 29.06 kg/m .        BP Readings from Last 1 Encounters:   02/01/21 (!) 160/76       BP noted to be elevated today in office.  Patient to follow up with Primary Care provider regarding elevated blood pressure.    Tre IS NOT a smoker/uses chewing tobacco.    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       Current Medications -   Current Outpatient Medications:      amLODIPine (NORVASC) 2.5 MG tablet, Take 1 tablet (2.5 mg) by mouth daily, Disp: 90 tablet, Rfl: 3     atenolol (TENORMIN) 50 MG tablet, Take 1 tablet (50 mg) by mouth 2 times daily, Disp: 180 tablet, Rfl: 3     atorvastatin (LIPITOR) 20 MG tablet, TAKE 1 TABLET (20 MG) BY MOUTH DAILY, Disp: 90 tablet, Rfl: 3     azelastine (ASTELIN) 0.1 % nasal spray, USE 2 SPRAYS INTO BOTH NOSTRILS 2 TIMES DAILY, Disp: 30 mL, Rfl: 7     bisacodyl (DULCOLAX) 5 MG EC tablet, Take 5 mg by mouth daily as needed for constipation, Disp: , Rfl:      Cholecalciferol (VITAMIN D) 2000 UNITS tablet, Take 2,000 Units by mouth daily, Disp: 100 tablet, Rfl: 3     diphenhydrAMINE-acetaminophen (TYLENOL PM EXTRA STRENGTH)  MG tablet, Take 1 tablet by mouth nightly as needed., Disp: 15 tablet, Rfl: 0     docusate calcium (SURFAK) 240 MG capsule, Take 240 mg by mouth daily, Disp: , Rfl:      finasteride (PROSCAR) 5 MG tablet, Take 1 tablet (5 mg) by mouth daily,  Disp: 90 tablet, Rfl: 2     FLUoxetine (PROZAC) 10 MG capsule, Take 1 capsule (10 mg) by mouth daily Office visit due, Disp: 90 capsule, Rfl: 3     omeprazole (PRILOSEC) 20 MG DR capsule, Take 1 capsule (20 mg) by mouth 2 times daily, Disp: 180 capsule, Rfl: 3     triamcinolone (KENALOG) 0.1 % external cream, APPLY TOPICALLY 2 TIMES DAILY ON BOTH LEGS FROM THE KNEES DOWN AS NEEDED, Disp: 454 g, Rfl: 0     vitamin B-12 (CYANOCOBALAMIN) 2500 MCG sublingual tablet, Take 1 tablet (2,500 mcg) by mouth daily, Disp: , Rfl:      vitamin C (ASCORBIC ACID) 1000 MG TABS, Take 1 tablet (1,000 mg) by mouth daily, Disp: , Rfl:      aspirin 81 MG EC tablet, Take 81 mg by mouth daily Takes at night, Disp: , Rfl:     Allergies -   Allergies   Allergen Reactions     No Known Drug Allergies        Social History -   Social History     Socioeconomic History     Marital status:      Spouse name: Zoila     Number of children: 4     Years of education: 8     Highest education level: Not on file   Occupational History     Occupation: retired     Employer: retired     Employer: RETIRED   Social Needs     Financial resource strain: Not on file     Food insecurity     Worry: Not on file     Inability: Not on file     Transportation needs     Medical: Not on file     Non-medical: Not on file   Tobacco Use     Smoking status: Former Smoker     Packs/day: 1.00     Years: 10.00     Pack years: 10.00     Types: Cigarettes     Quit date: 1972     Years since quittin.1     Smokeless tobacco: Never Used   Substance and Sexual Activity     Alcohol use: No     Alcohol/week: 0.0 standard drinks     Drug use: No     Sexual activity: Not Currently     Partners: Female   Lifestyle     Physical activity     Days per week: Not on file     Minutes per session: Not on file     Stress: Not on file   Relationships     Social connections     Talks on phone: Not on file     Gets together: Not on file     Attends Scientologist service: Not on file      Active member of club or organization: Not on file     Attends meetings of clubs or organizations: Not on file     Relationship status: Not on file     Intimate partner violence     Fear of current or ex partner: Not on file     Emotionally abused: Not on file     Physically abused: Not on file     Forced sexual activity: Not on file   Other Topics Concern      Service No     Blood Transfusions No     Caffeine Concern Yes     Comment: coffee: 4c/d  pop: 1/wk     Occupational Exposure No     Hobby Hazards No     Sleep Concern Yes     Comment: Tylenol pm     Stress Concern No     Weight Concern No     Special Diet Yes     Comment: low fat     Back Care Yes     Comment: Hx: seen chiropractor yrs ago     Exercise Yes     Comment: stretching every day     Bike Helmet Yes     Seat Belt Yes     Self-Exams Not Asked     Parent/sibling w/ CABG, MI or angioplasty before 65F 55M? No   Social History Narrative     Not on file       Family History -   Family History   Problem Relation Age of Onset     Diabetes Mother         Adult Onset     Cancer Sister      Diabetes Sister      Hypertension Sister      Cerebrovascular Disease Sister      Heart Disease Brother         valve replacement       Review of Systems - As per HPI and PMHx, otherwise review of system review of the head and neck negative. Otherwise 10+ review of system is negative    Physical Exam  BP (!) 160/76   Temp 98.5  F (36.9  C) (Temporal)   Wt 86.7 kg (191 lb 1.6 oz)   BMI 29.06 kg/m    BMI: Body mass index is 29.06 kg/m .    General - The patient is well nourished and well developed, and appears to have good nutritional status.  Alert and oriented to person and place, answers questions and cooperates with examination appropriately.    SKIN - No suspicious lesions or rashes.  Respiration - No respiratory distress.  Head and Face - Normocephalic and atraumatic, with no gross asymmetry noted of the contour of the facial features.  The facial nerve is  intact, with strong symmetric movements.    Voice and Breathing - The patient was breathing comfortably without the use of accessory muscles. The patients voice was clear and strong, and had appropriate pitch and quality.    Ears - Bilateral pinna and EACs with normal appearing overlying skin. Tympanic membrane intact with good mobility on pneumatic otoscopy bilaterally. Bony landmarks of the ossicular chain are normal. The tympanic membranes are normal in appearance. No retraction, perforation, or masses.  No fluid or purulence was seen in the external canal or the middle ear.  Exam on the left was normal only after cerumen disimpaction.    Eyes - Extraocular movements intact.  Sclera were not icteric or injected, conjunctiva were pink and moist.          Performed in clinic today:  Cerumen disimpaction left ear:  Significant cerumen impaction was appreciated on the left quite hard.  The cerumen curette mobilized with alligator forceps to carefully remove large chunk of cerumen.  Patient tolerated well.  Tympanic membrane is clear and canal is clear and dry.      A/P - Tre Fischer is a 89 year old male with hearing loss and cerumen impaction.  He felt much better after disimpaction.  Able to wear his hearing aids better.  He will see me back as needed.      Yves Schmidt MD

## 2021-02-01 ENCOUNTER — OFFICE VISIT (OUTPATIENT)
Dept: OTOLARYNGOLOGY | Facility: CLINIC | Age: 86
End: 2021-02-01
Payer: COMMERCIAL

## 2021-02-01 VITALS
SYSTOLIC BLOOD PRESSURE: 160 MMHG | WEIGHT: 191.1 LBS | DIASTOLIC BLOOD PRESSURE: 76 MMHG | BODY MASS INDEX: 29.06 KG/M2 | TEMPERATURE: 98.5 F

## 2021-02-01 DIAGNOSIS — H61.22 IMPACTED CERUMEN OF LEFT EAR: Primary | ICD-10-CM

## 2021-02-01 PROCEDURE — 69210 REMOVE IMPACTED EAR WAX UNI: CPT | Performed by: OTOLARYNGOLOGY

## 2021-02-01 PROCEDURE — 99207 PR NO CHARGE LOS: CPT | Performed by: OTOLARYNGOLOGY

## 2021-02-01 NOTE — LETTER
2/1/2021         RE: Tre Fischer  [address]  [facility] [address]        Dear Colleague,    Thank you for referring your patient, Tre Fischer, to the [facility]. Please see a copy of my visit note below.    ENT Consultation    Tre Fischer who is a 89 year old male seen in consultation at the request of self.      History of Present Illness - Tre Fischer is a 89 year old male with a long history of hearing loss wears hearing aids feels like his left ear is plugged.  In the significant cerumen impaction was discovered by audiology.  Indeed      Body mass index is 29.06 kg/m .        BP Readings from Last 1 Encounters:   02/01/21 (!) 160/76       BP noted to be elevated today in office.  Patient to follow up with Primary Care provider regarding elevated blood pressure.    Tre IS NOT a smoker/uses chewing tobacco.    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       Current Medications -   Current Outpatient Medications:      amLODIPine (NORVASC) 2.5 MG tablet, Take 1 tablet (2.5 mg) by mouth daily, Disp: 90 tablet, Rfl: 3     atenolol (TENORMIN) 50 MG tablet, Take 1 tablet (50 mg) by mouth 2 times daily, Disp: 180 tablet, Rfl: 3     atorvastatin (LIPITOR) 20 MG tablet, TAKE 1 TABLET (20 MG) BY MOUTH DAILY, Disp: 90 tablet, Rfl: 3     azelastine (ASTELIN) 0.1 % nasal spray, USE 2 SPRAYS INTO BOTH NOSTRILS 2 TIMES DAILY, Disp: 30 mL, Rfl: 7     bisacodyl (DULCOLAX) 5 MG EC tablet, Take 5 mg by mouth daily as needed for constipation, Disp: , Rfl:      Cholecalciferol (VITAMIN D) 2000 UNITS tablet, Take 2,000 Units by mouth daily, Disp: 100 tablet, Rfl: 3     diphenhydrAMINE-acetaminophen (TYLENOL PM EXTRA  STRENGTH)  MG tablet, Take 1 tablet by mouth nightly as needed., Disp: 15 tablet, Rfl: 0     docusate calcium (SURFAK) 240 MG capsule, Take 240 mg by mouth daily, Disp: , Rfl:      finasteride (PROSCAR) 5 MG tablet, Take 1 tablet (5 mg) by mouth daily, Disp: 90 tablet, Rfl: 2     FLUoxetine (PROZAC) 10 MG capsule, Take 1 capsule (10 mg) by mouth daily Office visit due, Disp: 90 capsule, Rfl: 3     omeprazole (PRILOSEC) 20 MG DR capsule, Take 1 capsule (20 mg) by mouth 2 times daily, Disp: 180 capsule, Rfl: 3     triamcinolone (KENALOG) 0.1 % external cream, APPLY TOPICALLY 2 TIMES DAILY ON BOTH LEGS FROM THE KNEES DOWN AS NEEDED, Disp: 454 g, Rfl: 0     vitamin B-12 (CYANOCOBALAMIN) 2500 MCG sublingual tablet, Take 1 tablet (2,500 mcg) by mouth daily, Disp: , Rfl:      vitamin C (ASCORBIC ACID) 1000 MG TABS, Take 1 tablet (1,000 mg) by mouth daily, Disp: , Rfl:      aspirin 81 MG EC tablet, Take 81 mg by mouth daily Takes at night, Disp: , Rfl:     Allergies -   Allergies   Allergen Reactions     No Known Drug Allergies        Social History -   Social History     Socioeconomic History     Marital status:      Spouse name: Zoila     Number of children: 4     Years of education: 8     Highest education level: Not on file   Occupational History     Occupation: retired     Employer: retired     Employer: RETIRED   Social Needs     Financial resource strain: Not on file     Food insecurity     Worry: Not on file     Inability: Not on file     Transportation needs     Medical: Not on file     Non-medical: Not on file   Tobacco Use     Smoking status: Former Smoker     Packs/day: 1.00     Years: 10.00     Pack years: 10.00     Types: Cigarettes     Quit date: 1972     Years since quittin.1     Smokeless tobacco: Never Used   Substance and Sexual Activity     Alcohol use: No     Alcohol/week: 0.0 standard drinks     Drug use: No     Sexual activity: Not Currently     Partners: Female   Lifestyle      Physical activity     Days per week: Not on file     Minutes per session: Not on file     Stress: Not on file   Relationships     Social connections     Talks on phone: Not on file     Gets together: Not on file     Attends Rastafari service: Not on file     Active member of club or organization: Not on file     Attends meetings of clubs or organizations: Not on file     Relationship status: Not on file     Intimate partner violence     Fear of current or ex partner: Not on file     Emotionally abused: Not on file     Physically abused: Not on file     Forced sexual activity: Not on file   Other Topics Concern      Service No     Blood Transfusions No     Caffeine Concern Yes     Comment: coffee: 4c/d  pop: 1/wk     Occupational Exposure No     Hobby Hazards No     Sleep Concern Yes     Comment: Tylenol pm     Stress Concern No     Weight Concern No     Special Diet Yes     Comment: low fat     Back Care Yes     Comment: Hx: seen chiropractor yrs ago     Exercise Yes     Comment: stretching every day     Bike Helmet Yes     Seat Belt Yes     Self-Exams Not Asked     Parent/sibling w/ CABG, MI or angioplasty before 65F 55M? No   Social History Narrative     Not on file       Family History -   Family History   Problem Relation Age of Onset     Diabetes Mother         Adult Onset     Cancer Sister      Diabetes Sister      Hypertension Sister      Cerebrovascular Disease Sister      Heart Disease Brother         valve replacement       Review of Systems - As per HPI and PMHx, otherwise review of system review of the head and neck negative. Otherwise 10+ review of system is negative    Physical Exam  BP (!) 160/76   Temp 98.5  F (36.9  C) (Temporal)   Wt 86.7 kg (191 lb 1.6 oz)   BMI 29.06 kg/m    BMI: Body mass index is 29.06 kg/m .    General - The patient is well nourished and well developed, and appears to have good nutritional status.  Alert and oriented to person and place, answers questions and  cooperates with examination appropriately.    SKIN - No suspicious lesions or rashes.  Respiration - No respiratory distress.  Head and Face - Normocephalic and atraumatic, with no gross asymmetry noted of the contour of the facial features.  The facial nerve is intact, with strong symmetric movements.    Voice and Breathing - The patient was breathing comfortably without the use of accessory muscles. The patients voice was clear and strong, and had appropriate pitch and quality.    Ears - Bilateral pinna and EACs with normal appearing overlying skin. Tympanic membrane intact with good mobility on pneumatic otoscopy bilaterally. Bony landmarks of the ossicular chain are normal. The tympanic membranes are normal in appearance. No retraction, perforation, or masses.  No fluid or purulence was seen in the external canal or the middle ear.  Exam on the left was normal only after cerumen disimpaction.    Eyes - Extraocular movements intact.  Sclera were not icteric or injected, conjunctiva were pink and moist.          Performed in clinic today:  Cerumen disimpaction left ear:  Significant cerumen impaction was appreciated on the left quite hard.  The cerumen curette mobilized with alligator forceps to carefully remove large chunk of cerumen.  Patient tolerated well.  Tympanic membrane is clear and canal is clear and dry.      A/P - Tre Fischer is a 89 year old male with hearing loss and cerumen impaction.  He felt much better after disimpaction.  Able to wear his hearing aids better.  He will see me back as needed.      Yves Schmidt MD        Again, thank you for allowing me to participate in the care of your patient.        Sincerely,        Yves Schmidt MD, MD

## 2021-02-08 ENCOUNTER — DOCUMENTATION ONLY (OUTPATIENT)
Dept: FAMILY MEDICINE | Facility: CLINIC | Age: 86
End: 2021-02-08

## 2021-02-08 NOTE — PROGRESS NOTES
"\"[facility] now requests orders and shares plan of care/discharge summaries for some patients through [facility].  Please REPLY TO THIS MESSAGE OR ROUTE BACK TO THE AUTHOR in order to give authorization for orders when needed.  This is considered a verbal order, you will still receive a faxed copy of orders for signature.  Thank you for your assistance in improving collaboration for our patients.    ORDER for home care re-certification  HN  1 every 60 days 1  For direct supervision and re-certification    HH  1 w 1  3 w 8  Hourly home health aid for personal cares, bathing and light housekeeping.    Martin Macias -[phone]      "

## 2021-02-17 ENCOUNTER — OFFICE VISIT (OUTPATIENT)
Dept: INTERNAL MEDICINE | Facility: CLINIC | Age: 86
End: 2021-02-17
Payer: COMMERCIAL

## 2021-02-17 VITALS
TEMPERATURE: 98.2 F | OXYGEN SATURATION: 97 % | BODY MASS INDEX: 29.1 KG/M2 | HEART RATE: 68 BPM | HEIGHT: 68 IN | SYSTOLIC BLOOD PRESSURE: 128 MMHG | DIASTOLIC BLOOD PRESSURE: 66 MMHG | WEIGHT: 192 LBS

## 2021-02-17 DIAGNOSIS — D50.8 IRON DEFICIENCY ANEMIA SECONDARY TO INADEQUATE DIETARY IRON INTAKE: ICD-10-CM

## 2021-02-17 DIAGNOSIS — M62.81 GENERALIZED MUSCLE WEAKNESS: ICD-10-CM

## 2021-02-17 DIAGNOSIS — R06.02 SOB (SHORTNESS OF BREATH): Primary | ICD-10-CM

## 2021-02-17 DIAGNOSIS — J31.0 CHRONIC RHINITIS: ICD-10-CM

## 2021-02-17 DIAGNOSIS — I10 ESSENTIAL HYPERTENSION WITH GOAL BLOOD PRESSURE LESS THAN 140/90: ICD-10-CM

## 2021-02-17 DIAGNOSIS — R22.43 LOCALIZED SWELLING OF BOTH LOWER LEGS: ICD-10-CM

## 2021-02-17 PROCEDURE — 99214 OFFICE O/P EST MOD 30 MIN: CPT | Performed by: INTERNAL MEDICINE

## 2021-02-17 RX ORDER — ZINC GLUCONATE 50 MG
50 TABLET ORAL DAILY
COMMUNITY
End: 2021-01-01

## 2021-02-17 RX ORDER — AZELASTINE 1 MG/ML
SPRAY, METERED NASAL
Qty: 30 ML | Refills: 7 | Status: SHIPPED | OUTPATIENT
Start: 2021-02-17 | End: 2021-01-01

## 2021-02-17 RX ORDER — LOSARTAN POTASSIUM 50 MG/1
50 TABLET ORAL DAILY
Qty: 90 TABLET | Refills: 3 | Status: SHIPPED | OUTPATIENT
Start: 2021-02-17 | End: 2021-01-01

## 2021-02-17 ASSESSMENT — MIFFLIN-ST. JEOR: SCORE: 1505.41

## 2021-02-17 ASSESSMENT — PAIN SCALES - GENERAL: PAINLEVEL: NO PAIN (0)

## 2021-02-17 NOTE — PROGRESS NOTES
Assessment & Plan     SOB (shortness of breath)  Shortness of breath for years, will check a BNP, echocardiogram there was some mention of some valvular disease.  I would like to switch him from amlodipine to losartan.  We will see if he has a low ejection fraction.  No orthopnea we will hold off on diuretics.  - BNP-N terminal pro; Future  - **TSH with free T4 reflex FUTURE anytime; Future  - Echocardiogram Complete; Future    Iron deficiency anemia secondary to inadequate dietary iron intake  Some iron deficiency anemia had a hemoglobin 11.5 we will recheck this next week when he is in for labs.  - **CBC with platelets FUTURE anytime; Future    Essential hypertension with goal blood pressure less than 140/90  Pressures controlled was high at the audiologist but okay today.  We will stop his amlodipine due to swelling having swelling we will replace this with losartan.  - **Comprehensive metabolic panel FUTURE anytime; Future  - **TSH with free T4 reflex FUTURE anytime; Future  - losartan (COZAAR) 50 MG tablet; Take 1 tablet (50 mg) by mouth daily    Generalized muscle weakness  Generalized muscle weakness we will check a CBC and thyroid  - **CBC with platelets FUTURE anytime; Future  - **TSH with free T4 reflex FUTURE anytime; Future    Localized swelling of both lower legs  Swelling seems to be from the amlodipine could be some heart failure right-sided or left-sided we will check his echo and BNP.  - **TSH with free T4 reflex FUTURE anytime; Future    Chronic rhinitis  Refill medication   - azelastine (ASTELIN) 0.1 % nasal spray; USE 2 SPRAYS INTO BOTH NOSTRILS 2 TIMES DAILY                 No follow-ups on file.    Sin Gill MD  [facility] LACEY Toledo is a 89 year old who presents for the following health issues  accompanied by his daughter:    HPI     Chief Complaint   Patient presents with     [facility] [place] [facility]     Htn  Anxiety     Lives in [place] [facility] on his own.  "Family is around and bring food.     Walks with walker, gets winded easily.  Some swelling. Sleeping a lot more now.  Little more brain fog this last year, wife passed away last May.      Homecare is there 3 days a week, around when showering.  Private pay, seen by Zeke.  Nurse is Martin Macias.     Possibly had covid but recovered kids had it.      Past Medical History:   Diagnosis Date     Basal cell carcinoma 2012    L scalp     Basal cell carcinoma 2012     Hypertension      Osteoarthrosis, unspecified whether generalized or localized, unspecified site      Pure hypercholesterolemia      URTICARIA NOS 2001     Current Outpatient Medications   Medication     atenolol (TENORMIN) 50 MG tablet     atorvastatin (LIPITOR) 20 MG tablet     azelastine (ASTELIN) 0.1 % nasal spray     bisacodyl (DULCOLAX) 5 MG EC tablet     Cholecalciferol (VITAMIN D) 2000 UNITS tablet     diphenhydrAMINE-acetaminophen (TYLENOL PM EXTRA STRENGTH)  MG tablet     finasteride (PROSCAR) 5 MG tablet     FLUoxetine (PROZAC) 10 MG capsule     losartan (COZAAR) 50 MG tablet     omeprazole (PRILOSEC) 20 MG DR capsule     triamcinolone (KENALOG) 0.1 % external cream     vitamin B-12 (CYANOCOBALAMIN) 2500 MCG sublingual tablet     vitamin C (ASCORBIC ACID) 1000 MG TABS     zinc gluconate 50 MG tablet     aspirin 81 MG EC tablet     docusate calcium (SURFAK) 240 MG capsule     No current facility-administered medications for this visit.      Social History     Tobacco Use     Smoking status: Former Smoker     Packs/day: 1.00     Years: 10.00     Pack years: 10.00     Types: Cigarettes     Quit date: 1972     Years since quittin.1     Smokeless tobacco: Never Used   Substance Use Topics     Alcohol use: No     Alcohol/week: 0.0 standard drinks     Drug use: No             Review of Systems   Tired, sleepy        Objective    /66   Pulse 68   Temp 98.2  F (36.8  C) (Temporal)   Ht 1.727 m (5' 8\")   Wt 87.1 kg " (192 lb)   SpO2 97%   BMI 29.19 kg/m    There is no height or weight on file to calculate BMI.  Physical Exam   No Distress, accompanied by daughter.    Heart is regular without obvious murmur  Lungs decreased breath sounds but no crackles  extremities have 1+ pitting edema in the mid shin.

## 2021-02-24 ENCOUNTER — HOSPITAL ENCOUNTER (OUTPATIENT)
Dept: CARDIOLOGY | Facility: CLINIC | Age: 86
Discharge: HOME | End: 2021-02-24
Attending: INTERNAL MEDICINE | Admitting: INTERNAL MEDICINE
Payer: COMMERCIAL

## 2021-02-24 DIAGNOSIS — R06.02 SOB (SHORTNESS OF BREATH): ICD-10-CM

## 2021-02-24 DIAGNOSIS — I10 ESSENTIAL HYPERTENSION WITH GOAL BLOOD PRESSURE LESS THAN 140/90: ICD-10-CM

## 2021-02-24 DIAGNOSIS — M62.81 GENERALIZED MUSCLE WEAKNESS: ICD-10-CM

## 2021-02-24 DIAGNOSIS — R22.43 LOCALIZED SWELLING OF BOTH LOWER LEGS: ICD-10-CM

## 2021-02-24 DIAGNOSIS — D50.8 IRON DEFICIENCY ANEMIA SECONDARY TO INADEQUATE DIETARY IRON INTAKE: ICD-10-CM

## 2021-02-24 LAB
ALBUMIN SERPL-MCNC: 3.3 G/DL (ref 3.4–5)
ALP SERPL-CCNC: 140 U/L (ref 40–150)
ALT SERPL W P-5'-P-CCNC: 21 U/L (ref 0–70)
ANION GAP SERPL CALCULATED.3IONS-SCNC: 3 MMOL/L (ref 3–14)
AST SERPL W P-5'-P-CCNC: 14 U/L (ref 0–45)
BILIRUB SERPL-MCNC: 0.4 MG/DL (ref 0.2–1.3)
BUN SERPL-MCNC: 20 MG/DL (ref 7–30)
CALCIUM SERPL-MCNC: 8.5 MG/DL (ref 8.5–10.1)
CHLORIDE SERPL-SCNC: 102 MMOL/L (ref 94–109)
CO2 SERPL-SCNC: 30 MMOL/L (ref 20–32)
CREAT SERPL-MCNC: 1.11 MG/DL (ref 0.66–1.25)
ERYTHROCYTE [DISTWIDTH] IN BLOOD BY AUTOMATED COUNT: 15 % (ref 10–15)
GFR SERPL CREATININE-BSD FRML MDRD: 58 ML/MIN/{1.73_M2}
GLUCOSE SERPL-MCNC: 88 MG/DL (ref 70–99)
HCT VFR BLD AUTO: 36.3 % (ref 40–53)
HGB BLD-MCNC: 11.4 G/DL (ref 13.3–17.7)
MCH RBC QN AUTO: 27.8 PG (ref 26.5–33)
MCHC RBC AUTO-ENTMCNC: 31.4 G/DL (ref 31.5–36.5)
MCV RBC AUTO: 89 FL (ref 78–100)
NT-PROBNP SERPL-MCNC: 1169 PG/ML (ref 0–450)
PLATELET # BLD AUTO: 193 10E9/L (ref 150–450)
POTASSIUM SERPL-SCNC: 4.2 MMOL/L (ref 3.4–5.3)
PROT SERPL-MCNC: 6.6 G/DL (ref 6.8–8.8)
RBC # BLD AUTO: 4.1 10E12/L (ref 4.4–5.9)
SODIUM SERPL-SCNC: 135 MMOL/L (ref 133–144)
TSH SERPL DL<=0.005 MIU/L-ACNC: 2.13 MU/L (ref 0.4–4)
WBC # BLD AUTO: 9.8 10E9/L (ref 4–11)

## 2021-02-24 PROCEDURE — 80053 COMPREHEN METABOLIC PANEL: CPT | Performed by: INTERNAL MEDICINE

## 2021-02-24 PROCEDURE — 93306 TTE W/DOPPLER COMPLETE: CPT | Mod: 26 | Performed by: INTERNAL MEDICINE

## 2021-02-24 PROCEDURE — 83880 ASSAY OF NATRIURETIC PEPTIDE: CPT | Performed by: INTERNAL MEDICINE

## 2021-02-24 PROCEDURE — 36415 COLL VENOUS BLD VENIPUNCTURE: CPT | Performed by: INTERNAL MEDICINE

## 2021-02-24 PROCEDURE — 85027 COMPLETE CBC AUTOMATED: CPT | Performed by: INTERNAL MEDICINE

## 2021-02-24 PROCEDURE — 93306 TTE W/DOPPLER COMPLETE: CPT

## 2021-02-24 PROCEDURE — 84443 ASSAY THYROID STIM HORMONE: CPT | Performed by: INTERNAL MEDICINE

## 2021-03-05 ENCOUNTER — TELEPHONE (OUTPATIENT)
Dept: INTERNAL MEDICINE | Facility: CLINIC | Age: 86
End: 2021-03-05

## 2021-03-05 DIAGNOSIS — Z53.9 DIAGNOSIS NOT YET DEFINED: Primary | ICD-10-CM

## 2021-03-05 PROCEDURE — 99207 PR MD RECERTIFICATION HHA PT: CPT | Performed by: INTERNAL MEDICINE

## 2021-03-05 RX ORDER — CETIRIZINE HYDROCHLORIDE 10 MG/1
10 TABLET ORAL DAILY
Status: CANCELLED | COMMUNITY
Start: 2021-03-05

## 2021-03-05 NOTE — TELEPHONE ENCOUNTER
Medications reconciled on [facility] form, changes noted on form.  Form to PCP for signature.    Scarlett Gomez RN  [facility]

## 2021-03-05 NOTE — TELEPHONE ENCOUNTER
Home Health Certification and Plan of Care forms received.     Certification Period from 2/11/2021 to 4/11/2021.    Forms placed in [facility] folder.     ANDRE LeaN, RN  [facility]

## 2021-04-07 NOTE — PROGRESS NOTES
"\"[facility] now requests orders and shares plan of care/discharge summaries for some patients through [employer].  Please REPLY TO THIS MESSAGE OR ROUTE BACK TO THE AUTHOR in order to give authorization for orders when needed.  This is considered a verbal order, you will still receive a faxed copy of orders for signature.  Thank you for your assistance in improving collaboration for our patients.    ORDER for home care re-certification  HN  1 every 60 days 1  For direct supervision and re-certification.    HH  3 w 8  2 w 1  [facility] home health aid for personal cares, bathing and light housekeeping.  Currently requesting 3 times per week 2 hours per visit.    Martin Macias -[phone]      "

## 2021-04-15 NOTE — TELEPHONE ENCOUNTER
Reason for Call:  Form, our goal is to have forms completed with 72 hours, however, some forms may require a visit or additional information.    Type of letter, form or note:  Home Health Certification    Who is the form from?: Home care    Where did the form come from: form was faxed in    What clinic location was the form placed at?: [facility]    Where the form was placed: Given to MA/RN    What number is listed as a contact on the form?:        Additional comments:     Call taken on 4/15/2021 at 10:48 AM by Pamela Lyles

## 2021-04-16 NOTE — TELEPHONE ENCOUNTER
Medication reconciliation completed. Forms given to PCP to sign.     MARCUS Lea, RN  [facility]

## 2021-04-16 NOTE — TELEPHONE ENCOUNTER
Medication reconciliation completed. Forms given to PCP to sign.     MARCUS Lea, RN  [facility]

## 2021-04-16 NOTE — TELEPHONE ENCOUNTER
Home Health Certification and Plan of Care forms received.     Certification Period from 2/11/2021 to 4/11/2021.    Forms placed in [facility] folder.     ANDRE LeaN, RN  [facility]

## 2021-05-04 NOTE — TELEPHONE ENCOUNTER
Routing refill request to provider for review/approval because:  Labs not current:  LDL    ANDRE LeaN, RN  [facility]

## 2021-06-07 NOTE — PROGRESS NOTES
"\"[facility] now requests orders and shares plan of care/discharge summaries for some patients through [employer].  Please REPLY TO THIS MESSAGE OR ROUTE BACK TO THE AUTHOR in order to give authorization for orders when needed.  This is considered a verbal order, you will still receive a faxed copy of orders for signature.  Thank you for your assistance in improving collaboration for our patients.    ORDER for home care re-certification.  HN  1 every 60 days 1  For direct supervision and re-certification.    HH  1 w 1  3 w 8  1 w 1  Hourly home health aid for personal cares, bathing and light housekeeping.  Currently requesting 3 times per week 2 hours per visit.    Martin Macias -[phone]    "

## 2021-06-11 NOTE — TELEPHONE ENCOUNTER
Medication reconciliation completed by RN. Form and chart forwarded to PCP for signatures.    Shruti Avendaño RN

## 2021-06-11 NOTE — TELEPHONE ENCOUNTER
Reason for Call:  Form, our goal is to have forms completed with 72 hours, however, some forms may require a visit or additional information.    Type of letter, form or note:  Home Health Certification    Who is the form from?: Home care    Where did the form come from: form was faxed in    What clinic location was the form placed at?: [facility]    Where the form was placed: Given to MA/RN    What number is listed as a contact on the form?: [phone]       Additional comments:     Call taken on 6/11/2021 at 2:55 PM by Monica Goldstein

## 2021-06-28 NOTE — TELEPHONE ENCOUNTER
Tried to reach patient, left message for patient that he is scheduled for an appointment at 2 on 6/30/2021 with Dr. Gill. Mychart message is sent to patient.      Mindy Barker [facility]

## 2021-06-28 NOTE — TELEPHONE ENCOUNTER
Daughter Debora is calling and wanting patient to be seen for recheck/follow up on medications and that patient has been feeling more weak, cough and congestion, blood pressure has been elevated 172/86 per home care RN during visit. Please advise daughter of work in for Friday, 7/2. Angela Rois LPN

## 2021-06-30 PROBLEM — J44.9 COPD (CHRONIC OBSTRUCTIVE PULMONARY DISEASE) (H): Status: ACTIVE | Noted: 2021-01-01

## 2021-06-30 PROBLEM — E44.1 MILD PROTEIN-CALORIE MALNUTRITION (H): Status: RESOLVED | Noted: 2019-06-28 | Resolved: 2021-01-01

## 2021-06-30 NOTE — PROGRESS NOTES
Assessment & Plan     Pulse irregularity  Pulse irregular for home care nurse, appears regular today but we will check an EKG.  - EKG 12-lead complete w/read - Clinics  - Vitamin B12    Nocturia  Nocturia and frequent urination he is already on finasteride we'll try him on some tamsulosin. Warned about possible hypotension and syncope.  - tamsulosin (FLOMAX) 0.4 MG capsule; Take 1 capsule (0.4 mg) by mouth daily    Fatigue, unspecified type  Fatigue just not been as active, not interested in things. Unclear if this is from the pandemic and just aging. He is got a little bit of memory and cognitive changes as well. We'll check labs, B12 CK TSH. He will get a chest CT done as he is having a cough with a lot of sputum.  - CBC with platelets  - Comprehensive metabolic panel  - TSH with free T4 reflex  - CK total  - CT Chest w/o Contrast; Future  - Vitamin B12    Cough  Cough we will check a chest CT today his lung exam appears okay he has a history of mild emphysema may do better with an inhaler in the future.  - CBC with platelets  - CT Chest w/o Contrast; Future    Essential hypertension with goal blood pressure less than 140/90  Blood pressure is treated at 152/84 home care got a #172 will increase his losartan from 50 mg once a day to twice a day.  - losartan (COZAAR) 50 MG tablet; Take 1 tablet (50 mg) by mouth 2 times daily    Chronic obstructive pulmonary disease, unspecified COPD type (H)  COPD which is mild, no inhalers but may need to consider this in the future.                   No follow-ups on file.    Sin Gill MD  [facility] LACEY Toledo is a 89 year old who presents for the following health issues     HPI     Chief Complaint   Patient presents with     Cough     Productive cough, wheezing and sob, going on for about six months - Nurse wanted him checked out     Irregular Heart Beat     Per RN     homecare nurse was out and bp was high at 172/86 and some  irregular pulse.     Had episode of illness, diarrhea in March, got weak from that. Gone downhill, sleeps more.     Eating ok now BM are ok,     Cough and gurgling with sputum, using mucinex and zyrtec.     Past Medical History:   Diagnosis Date     Basal cell carcinoma 2012    L scalp     Basal cell carcinoma 2012     Hypertension      Osteoarthrosis, unspecified whether generalized or localized, unspecified site      Pure hypercholesterolemia      URTICARIA NOS 2001     Current Outpatient Medications   Medication     atenolol (TENORMIN) 50 MG tablet     atorvastatin (LIPITOR) 20 MG tablet     azelastine (ASTELIN) 0.1 % nasal spray     cetirizine (ZYRTEC) 10 MG tablet     Cholecalciferol (VITAMIN D) 2000 UNITS tablet     dextromethorphan-guaiFENesin (MUCINEX DM)  MG 12 hr tablet     diphenhydrAMINE-acetaminophen (TYLENOL PM EXTRA STRENGTH)  MG tablet     docusate calcium (SURFAK) 240 MG capsule     finasteride (PROSCAR) 5 MG tablet     FLUoxetine (PROZAC) 10 MG capsule     losartan (COZAAR) 50 MG tablet     omeprazole (PRILOSEC) 20 MG DR capsule     vitamin B complex with vitamin C (VITAMIN  B COMPLEX) tablet     vitamin C (ASCORBIC ACID) 1000 MG TABS     No current facility-administered medications for this visit.      Social History     Tobacco Use     Smoking status: Former Smoker     Packs/day: 1.00     Years: 10.00     Pack years: 10.00     Types: Cigarettes     Quit date: 1972     Years since quittin.5     Smokeless tobacco: Never Used   Substance Use Topics     Alcohol use: No     Alcohol/week: 0.0 standard drinks     Drug use: No           Review of Systems   CONSTITUTIONAL:weakness  INTEGUMENTARY/SKIN: NEGATIVE for worrisome rashes, moles or lesions  EYES: NEGATIVE for vision changes or irritation  ENT/MOUTH: NEGATIVE for ear, mouth and throat problems  RESP: NEGATIVE for significant cough or SOB  CV: NEGATIVE for chest pain, palpitations or peripheral edema  GI: NEGATIVE  for nausea, abdominal pain, heartburn, or change in bowel habits  : nocturia  MUSCULOSKELETAL: NEGATIVE for significant arthralgias or myalgia  NEURO: NEGATIVE for weakness, dizziness or paresthesias  ENDOCRINE: NEGATIVE for temperature intolerance, skin/hair changes  HEME: NEGATIVE for bleeding problems  PSYCHIATRIC: NEGATIVE for changes in mood or affect      Objective    BP (!) 152/84   Pulse 64   Temp 97.8  F (36.6  C) (Temporal)   Resp 20   Wt 86.2 kg (190 lb)   SpO2 98%   BMI 28.89 kg/m    Body mass index is 28.89 kg/m .  Physical Exam   No acute distress, hard of hearing  Patient is able to stand up on the table for examination  Heart sounds regular but distant  Lungs are decreased breath sounds bilaterally and abdomen soft benign nontender  Extremities have no edema.    Labs pending  EKG pending.   Bradycardia but regular, no st changes

## 2021-07-06 NOTE — PROGRESS NOTES
Appropriate assistive devices provided during their visit. Yes (Yes, No, N/A) wheelchair/tech (list device)    Exam table and/or cart  placed in the lowest position. yes (Yes, No, N/A)    Brakes on tables/carts/wheelchairs used at all times. yes (Yes, No, N/A)    Non slip footwear applied. yes (Yes, No, NA)    Patient was accompanied by staff throughout visit. yes (Yes, No, N/A)    Equipment safety straps used. N/a (Yes, No, N/A)    Assist with toileting. N/a (Yes, No, N/A)      Afia Moran  7/6/2021  10:16 AM

## 2021-08-11 NOTE — TELEPHONE ENCOUNTER
Felicitas calling to follow up on forms faxed for requested homecare services that are needing to start today, nurse visits and home health aid. Please advise on orders being requested. Angela Rios LPN

## 2021-08-18 NOTE — TELEPHONE ENCOUNTER
Reason for Call:  Form, our goal is to have forms completed with 72 hours, however, some forms may require a visit or additional information.    Type of letter, form or note:  Home Health Certification    Who is the form from?: Home care    Where did the form come from: form was faxed in    What clinic location was the form placed at?: [facility]     Where the form was placed: Given to RADHA cohen    What number is listed as a contact on the form?: [phone]       Additional comments:     Call taken on 8/18/2021 at 3:46 PM by Monica Goldstein

## 2021-08-19 NOTE — TELEPHONE ENCOUNTER
Patient's medication reconciliation is completed by RN today, 8/19/21.  Forms placed on Dr. Gill desk for signature.  MARCUS Fairchild, RN     Certification period: 8/10/21 to 10/8/21.

## 2021-08-30 NOTE — LETTER
8/30/2021         RE: Tre Fischer  [address]  [facility] [address]        Dear Colleague,    Thank you for referring your patient, Tre Fischer, to the [facility]. Please see a copy of my visit note below.    History of Present Illness - Tre Fischer is a 89 year old male presenting in clinic today for a recheck on Patient presents with:  RECHECK    Patient with history of hearing loss has hearing aids feels that he is left ear has been plugged.  On the right his daughter was able to remove some cerumen.      BP Readings from Last 1 Encounters:   08/30/21 136/86       BP noted to be well controlled today in office.     Tre IS NOT a smoker/uses chewing tobacco.    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       Current Medications -   Current Outpatient Medications:      amLODIPine (NORVASC) 2.5 MG tablet, 2.5 mg, Disp: , Rfl:      atenolol (TENORMIN) 50 MG tablet, Take 1 tablet (50 mg) by mouth 2 times daily, Disp: 180 tablet, Rfl: 3     atorvastatin (LIPITOR) 20 MG tablet, TAKE 1 TABLET (20 MG) BY MOUTH DAILY, Disp: 90 tablet, Rfl: 1     azelastine (ASTELIN) 0.1 % nasal spray, USE 2 SPRAYS INTO BOTH NOSTRILS 2 TIMES DAILY, Disp: 30 mL, Rfl: 7     docusate calcium (SURFAK) 240 MG capsule, Take 240 mg by mouth daily, Disp: , Rfl:      docusate sodium (COLACE) 100 MG capsule, Take 1 capsule (100 mg) by mouth daily, Disp: , Rfl:      finasteride (PROSCAR) 5 MG tablet, Take 1 tablet (5 mg) by mouth daily, Disp: 90 tablet, Rfl: 2     FLUoxetine (PROZAC) 10 MG capsule, Take 2 capsules (20 mg) by mouth daily Office visit due, Disp: 180 capsule, Rfl: 3     ipratropium - albuterol 0.5 mg/2.5 mg/3 mL (DUONEB) 0.5-2.5  (3) MG/3ML neb solution, Take 1 vial (3 mLs) by nebulization every 6 hours as needed for shortness of breath / dyspnea or wheezing, Disp: 300 mL, Rfl: 1     losartan (COZAAR) 50 MG tablet, Take 1 tablet (50 mg) by mouth 2 times daily, Disp: 180 tablet, Rfl: 3     omeprazole (PRILOSEC) 20 MG DR capsule, Take 1 capsule (20 mg) by mouth 2 times daily, Disp: 180 capsule, Rfl: 3     tamsulosin (FLOMAX) 0.4 MG capsule, Take 1 capsule (0.4 mg) by mouth daily, Disp: 30 capsule, Rfl: 3     vitamin B complex with vitamin C (VITAMIN  B COMPLEX) tablet, Take 1 tablet by mouth daily, Disp: , Rfl:      vitamin C (ASCORBIC ACID) 1000 MG TABS, Take 1 tablet (1,000 mg) by mouth daily, Disp: , Rfl:      vitamin D3 (CHOLECALCIFEROL) 50 mcg (2000 units) tablet, Take 0.5 tablets (25 mcg) by mouth daily, Disp: 100 tablet, Rfl: 3     cetirizine (ZYRTEC) 10 MG tablet, Take 10 mg by mouth daily (Patient not taking: Reported on 2021), Disp: , Rfl:      pseudoePHEDrine-guaiFENesin (MUCINEX D)  MG 12 hr tablet, Take 1 tablet by mouth every 12 hours (Patient not taking: Reported on 2021), Disp: , Rfl:     Allergies -   Allergies   Allergen Reactions     No Known Drug Allergies        Social History -   Social History     Socioeconomic History     Marital status:      Spouse name: Zoila     Number of children: 4     Years of education: 8     Highest education level: Not on file   Occupational History     Occupation: retired     Employer: retired     Employer: RETIRED   Tobacco Use     Smoking status: Former Smoker     Packs/day: 1.00     Years: 10.00     Pack years: 10.00     Types: Cigarettes     Quit date: 1972     Years since quittin.6     Smokeless tobacco: Never Used   Substance and Sexual Activity     Alcohol use: No     Alcohol/week: 0.0 standard drinks     Drug use: No     Sexual activity: Not Currently     Partners: Female   Other Topics Concern      Service No     Blood Transfusions No      Caffeine Concern Yes     Comment: coffee: 4c/d  pop: 1/wk     Occupational Exposure No     Hobby Hazards No     Sleep Concern Yes     Comment: Tylenol pm     Stress Concern No     Weight Concern No     Special Diet Yes     Comment: low fat     Back Care Yes     Comment: Hx: seen chiropractor yrs ago     Exercise Yes     Comment: stretching every day     Bike Helmet Yes     Seat Belt Yes     Self-Exams Not Asked     Parent/sibling w/ CABG, MI or angioplasty before 65F 55M? No   Social History Narrative     Not on file     Social Determinants of Health     Financial Resource Strain:      Difficulty of Paying Living Expenses:    Food Insecurity:      Worried About Running Out of Food in the Last Year:      Ran Out of Food in the Last Year:    Transportation Needs:      Lack of Transportation (Medical):      Lack of Transportation (Non-Medical):    Physical Activity:      Days of Exercise per Week:      Minutes of Exercise per Session:    Stress:      Feeling of Stress :    Social Connections:      Frequency of Communication with Friends and Family:      Frequency of Social Gatherings with Friends and Family:      Attends [employer] Services:      Active Member of Clubs or Organizations:      Attends Club or Organization Meetings:      Marital Status:    Intimate Partner Violence:      Fear of Current or Ex-Partner:      Emotionally Abused:      Physically Abused:      Sexually Abused:        Family History -   Family History   Problem Relation Age of Onset     Diabetes Mother         Adult Onset     Cancer Sister      Diabetes Sister      Hypertension Sister      Cerebrovascular Disease Sister      Heart Disease Brother         valve replacement       Review of Systems - As per HPI and PMHx, otherwise review of system review of the head and neck negative. Otherwise 10+ review of system is negative    Physical Exam  /86 (BP Location: Right arm, Patient Position: Sitting, Cuff Size: Adult Regular)   Temp 97.5  F  "(36.4  C) (Temporal)   Ht 1.727 m (5' 8\")   Wt 85.4 kg (188 lb 5 oz)   BMI 28.63 kg/m    BMI: Body mass index is 28.63 kg/m .    General - The patient is well nourished and well developed, and appears to have good nutritional status.  Alert and oriented to person and place, answers questions and cooperates with examination appropriately.    SKIN - No suspicious lesions or rashes.  Respiration - No respiratory distress.  Head and Face - Normocephalic and atraumatic, with no gross asymmetry noted of the contour of the facial features.  The facial nerve is intact, with strong symmetric movements.    Voice and Breathing - The patient was breathing comfortably without the use of accessory muscles. The patients voice was clear and strong, and had appropriate pitch and quality.    Ears - Bilateral pinna and EACs with normal appearing overlying skin. Tympanic membrane intact with good mobility on pneumatic otoscopy bilaterally. Bony landmarks of the ossicular chain are normal. The tympanic membranes are normal in appearance. No retraction, perforation, or masses.  No fluid or purulence was seen in the external canal or the middle ear.       Performed in clinic today:  Cerumen disimpaction left ear.  Significant cerumen impaction is appreciated in the left side.  Under the guidance of magnified speculum using cerumen curette using alligator forceps and suction I removed large chunks of cerumen.  Patient tolerated procedure well.      A/P - Tre Fischer is a 89 year old male Patient presents with:  RECHECK    Patient had a successful cerumen disimpaction on the left.  He will continue wearing his hearing aids and has an appointment with audiology.  His daughters did not want him to get hearing testing today.    Tre should follow up in 1 year.      At Tre next appointment they will need a hearing test.      Yves Schmidt MD            Again, thank you for allowing me to participate in the care of your patient.  "       Sincerely,        Yves Schmidt MD, MD

## 2021-08-30 NOTE — PROGRESS NOTES
History of Present Illness - Tre Fischer is a 89 year old male presenting in clinic today for a recheck on Patient presents with:  RECHECK    Patient with history of hearing loss has hearing aids feels that he is left ear has been plugged.  On the right his daughter was able to remove some cerumen.      BP Readings from Last 1 Encounters:   08/30/21 136/86       BP noted to be well controlled today in office.     Tre IS NOT a smoker/uses chewing tobacco.    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       Current Medications -   Current Outpatient Medications:      amLODIPine (NORVASC) 2.5 MG tablet, 2.5 mg, Disp: , Rfl:      atenolol (TENORMIN) 50 MG tablet, Take 1 tablet (50 mg) by mouth 2 times daily, Disp: 180 tablet, Rfl: 3     atorvastatin (LIPITOR) 20 MG tablet, TAKE 1 TABLET (20 MG) BY MOUTH DAILY, Disp: 90 tablet, Rfl: 1     azelastine (ASTELIN) 0.1 % nasal spray, USE 2 SPRAYS INTO BOTH NOSTRILS 2 TIMES DAILY, Disp: 30 mL, Rfl: 7     docusate calcium (SURFAK) 240 MG capsule, Take 240 mg by mouth daily, Disp: , Rfl:      docusate sodium (COLACE) 100 MG capsule, Take 1 capsule (100 mg) by mouth daily, Disp: , Rfl:      finasteride (PROSCAR) 5 MG tablet, Take 1 tablet (5 mg) by mouth daily, Disp: 90 tablet, Rfl: 2     FLUoxetine (PROZAC) 10 MG capsule, Take 2 capsules (20 mg) by mouth daily Office visit due, Disp: 180 capsule, Rfl: 3     ipratropium - albuterol 0.5 mg/2.5 mg/3 mL (DUONEB) 0.5-2.5 (3) MG/3ML neb solution, Take 1 vial (3 mLs) by nebulization every 6 hours as needed for shortness of breath / dyspnea or wheezing, Disp: 300 mL, Rfl: 1     losartan (COZAAR) 50 MG tablet, Take 1 tablet (50 mg) by mouth 2 times daily, Disp: 180 tablet,  Rfl: 3     omeprazole (PRILOSEC) 20 MG DR capsule, Take 1 capsule (20 mg) by mouth 2 times daily, Disp: 180 capsule, Rfl: 3     tamsulosin (FLOMAX) 0.4 MG capsule, Take 1 capsule (0.4 mg) by mouth daily, Disp: 30 capsule, Rfl: 3     vitamin B complex with vitamin C (VITAMIN  B COMPLEX) tablet, Take 1 tablet by mouth daily, Disp: , Rfl:      vitamin C (ASCORBIC ACID) 1000 MG TABS, Take 1 tablet (1,000 mg) by mouth daily, Disp: , Rfl:      vitamin D3 (CHOLECALCIFEROL) 50 mcg (2000 units) tablet, Take 0.5 tablets (25 mcg) by mouth daily, Disp: 100 tablet, Rfl: 3     cetirizine (ZYRTEC) 10 MG tablet, Take 10 mg by mouth daily (Patient not taking: Reported on 2021), Disp: , Rfl:      pseudoePHEDrine-guaiFENesin (MUCINEX D)  MG 12 hr tablet, Take 1 tablet by mouth every 12 hours (Patient not taking: Reported on 2021), Disp: , Rfl:     Allergies -   Allergies   Allergen Reactions     No Known Drug Allergies        Social History -   Social History     Socioeconomic History     Marital status:      Spouse name: Zoila     Number of children: 4     Years of education: 8     Highest education level: Not on file   Occupational History     Occupation: retired     Employer: retired     Employer: RETIRED   Tobacco Use     Smoking status: Former Smoker     Packs/day: 1.00     Years: 10.00     Pack years: 10.00     Types: Cigarettes     Quit date: 1972     Years since quittin.6     Smokeless tobacco: Never Used   Substance and Sexual Activity     Alcohol use: No     Alcohol/week: 0.0 standard drinks     Drug use: No     Sexual activity: Not Currently     Partners: Female   Other Topics Concern      Service No     Blood Transfusions No     Caffeine Concern Yes     Comment: coffee: 4c/d  pop: 1/wk     Occupational Exposure No     Hobby Hazards No     Sleep Concern Yes     Comment: Tylenol pm     Stress Concern No     Weight Concern No     Special Diet Yes     Comment: low fat     Back Care  "Yes     Comment: Hx: seen chiropractor yrs ago     Exercise Yes     Comment: stretching every day     Bike Helmet Yes     Seat Belt Yes     Self-Exams Not Asked     Parent/sibling w/ CABG, MI or angioplasty before 65F 55M? No   Social History Narrative     Not on file     Social Determinants of Health     Financial Resource Strain:      Difficulty of Paying Living Expenses:    Food Insecurity:      Worried About Running Out of Food in the Last Year:      Ran Out of Food in the Last Year:    Transportation Needs:      Lack of Transportation (Medical):      Lack of Transportation (Non-Medical):    Physical Activity:      Days of Exercise per Week:      Minutes of Exercise per Session:    Stress:      Feeling of Stress :    Social Connections:      Frequency of Communication with Friends and Family:      Frequency of Social Gatherings with Friends and Family:      Attends Anabaptism Services:      Active Member of Clubs or Organizations:      Attends Club or Organization Meetings:      Marital Status:    Intimate Partner Violence:      Fear of Current or Ex-Partner:      Emotionally Abused:      Physically Abused:      Sexually Abused:        Family History -   Family History   Problem Relation Age of Onset     Diabetes Mother         Adult Onset     Cancer Sister      Diabetes Sister      Hypertension Sister      Cerebrovascular Disease Sister      Heart Disease Brother         valve replacement       Review of Systems - As per HPI and PMHx, otherwise review of system review of the head and neck negative. Otherwise 10+ review of system is negative    Physical Exam  /86 (BP Location: Right arm, Patient Position: Sitting, Cuff Size: Adult Regular)   Temp 97.5  F (36.4  C) (Temporal)   Ht 1.727 m (5' 8\")   Wt 85.4 kg (188 lb 5 oz)   BMI 28.63 kg/m    BMI: Body mass index is 28.63 kg/m .    General - The patient is well nourished and well developed, and appears to have good nutritional status.  Alert and oriented " to person and place, answers questions and cooperates with examination appropriately.    SKIN - No suspicious lesions or rashes.  Respiration - No respiratory distress.  Head and Face - Normocephalic and atraumatic, with no gross asymmetry noted of the contour of the facial features.  The facial nerve is intact, with strong symmetric movements.    Voice and Breathing - The patient was breathing comfortably without the use of accessory muscles. The patients voice was clear and strong, and had appropriate pitch and quality.    Ears - Bilateral pinna and EACs with normal appearing overlying skin. Tympanic membrane intact with good mobility on pneumatic otoscopy bilaterally. Bony landmarks of the ossicular chain are normal. The tympanic membranes are normal in appearance. No retraction, perforation, or masses.  No fluid or purulence was seen in the external canal or the middle ear.       Performed in clinic today:  Cerumen disimpaction left ear.  Significant cerumen impaction is appreciated in the left side.  Under the guidance of magnified speculum using cerumen curette using alligator forceps and suction I removed large chunks of cerumen.  Patient tolerated procedure well.      A/P - Tre RADHA Fischer is a 89 year old male Patient presents with:  RECHECK    Patient had a successful cerumen disimpaction on the left.  He will continue wearing his hearing aids and has an appointment with audiology.  His daughters did not want him to get hearing testing today.    Tre should follow up in 1 year.      At Tre next appointment they will need a hearing test.      Yves Schmidt MD

## 2021-08-30 NOTE — PROGRESS NOTES
Hearing Aid Check     SUBJECTIVE:  Tre Fischer is a 89 year old year old male, seen today for hearing aid checkt, at [facility]. Patient reports feedback, right worse than left.  He is currently using binaural Gordon 3 Series i110 LAUREN 312d, with custom canal earmolds.     OBJECTIVE:  Otoscopic exam indicates moderate non-occluding wax right ear removed without incident using a lighted curette (some wax remains deep in the ear canal near the tympanic membrane removal, was not attempted) bilaterally.     Hearing aid maintenance was completed to clean external components, remove canal molds and clean. Cleaned receivers and removed wax filters, vacuumed behind filter, and replaced wax filter.  Vacuumed yrn ports. Biologic listening check revealed appropriate function of both hearing aids. Red plastic cover is missing from the right .     PLAN:   Recommended ENT consult for ear cleaning, then schedule an appointment for earmold impressions to make new earmolds to reduce feedback.  If he has any problems with the hearing aids recommended follow up as needed.     Morelia Lewis.  [place] Licensed Audiologist #[id number]

## 2021-08-30 NOTE — PROGRESS NOTES
Hearing Aid Check     SUBJECTIVE:  Tre Fischer is a 89 year old year old male, seen today for ear mold impressions, at [facility]. Patient was seen by Dr. Schmidt for ear cleaning before impressions, declined hearing test today.     OBJECTIVE:  Otoscopic exam indicates clear canals and visible landmarks. Binaural earmold impressions were taken without incident. Replacement Gordon Silicone Skeleton earmolds will be ordered, right and left. Also ordering a replacement right #4 60 gain .     PLAN:   Call for  of  and earmolds.     Morelia Lewis.  [place] Licensed Audiologist #[id number]

## 2021-09-03 NOTE — TELEPHONE ENCOUNTER
"  Requested Prescriptions   Pending Prescriptions Disp Refills     finasteride (PROSCAR) 5 MG tablet [Pharmacy Med Name: FINASTERIDE 5 MG TABLET 5 Tablet] 30 tablet 2     Sig: TAKE 1 TABLET (5 MG) BY MOUTH DAILY   6/30/2021    BPH Agents Passed - 9/1/2021  1:38 PM        Passed - Recent (12 mo) or future (30 days) visit within the authorizing provider's department     Patient has had an office visit with the authorizing provider or a provider within the authorizing providers department within the previous 12 mos or has a future within next 30 days. See \"Patient Info\" tab in inbasket, or \"Choose Columns\" in Meds & Orders section of the refill encounter.              Passed - Medication is active on med list        Passed - Patient is 18 years of age or older           Prescription approved per [facility] Refill Protocol.  Shruti Avendaño RN    "

## 2021-09-21 NOTE — PROGRESS NOTES
Hearing Aid Check     SUBJECTIVE:  Tre Fischer is a 89 year old year old male, seen today for replacement ear mold fitting and replacement right receivert, at [facility]. Patient reports feedback, right worse than left.  He is currently using binaural Gordon 3 Series i110 LAUREN 312d, with custom canal earmolds.     OBJECTIVE:  Otoscopic exam indicates clear canals bilaterally. Removed old and replaced right Gordon 60 Gain #4 . Fit replacement silicone skeleton earmolds, vacuumed hearing aid mics. Biologic listening check revealed appropriate function.     PLAN:   Advised of the 90 day remake period on the earmolds if he experiences discomfort or continued feedback. If he has any problems with the hearing aids recommended follow up as needed.     Morelia Lewis.  [place] Licensed Audiologist #[id number]

## 2021-10-04 NOTE — TELEPHONE ENCOUNTER
[facility] is utilizing this an encounter to take the place of a direct phone call to your office. Please take a moment to review the below request. Please reply or route message to author of this encounter.  Message will act as a verbal OK of orders requested below. Thank you.    ORDERS  HN 1 EVERY 60 DAYS 1  HH 3 W 8, 1 W 1 2 HOURS PER VISIT     Requesting approval for POC developed to treat HTN and generalized weakness for private pay home health aide.      Martin Macias RN/Temitope POWELLN RN  [facility]  [phone]

## 2021-10-11 NOTE — TELEPHONE ENCOUNTER
Reason for Call:  Form, our goal is to have forms completed with 72 hours, however, some forms may require a visit or additional information.    Type of letter, form or note:  Home Health Certification    Who is the form from?: Home care    Where did the form come from: form was faxed in    What clinic location was the form placed at?: [facility]     Where the form was placed: Given to MA/PAOLA CALLEJAS    What number is listed as a contact on the form?: [phone]       Additional comments:

## 2021-10-12 NOTE — TELEPHONE ENCOUNTER
Medication reconciliation completed by RN. Form and chart forwarded to PCP for signatures.  PCP:  MD Shruti Eden RN

## 2021-10-27 NOTE — TELEPHONE ENCOUNTER
Pending Prescriptions:                       Disp   Refills    triamcinolone (KENALOG) 0.1 % external cre*454 g  0        Sig: APPLY TOPICALLY 2 TIMES DAILY ON BOTH LEGS FROM THE           KNEES DOWN AS NEEDED    Routing refill request to provider for review/approval because:  Drug not active on patient's medication list

## 2021-10-28 NOTE — TELEPHONE ENCOUNTER
Pending Prescriptions:                       Disp   Refills    azelastine (ASTELIN) 0.1 % nasal spray [Ph*30 mL  7        Sig: INHALE 2 SPRAYS INTO BOTH NOSTRILS 2 TIMES DAILY    Signed Prescriptions:                        Disp   Refills    ipratropium - albuterol 0.5 mg/2.5 mg/3 mL*270 mL 0        Sig: USE ONE VIAL VIA NEBULIZER ROUTE EVERY 6 HOURS AS           NEEDED FOR SHORTNESS OF BREATH/DYSPNEA OR           WHEEZING  Authorizing Provider: DIANA GARCIA  Ordering User: AURORA RIZZO    tamsulosin (FLOMAX) 0.4 MG capsule         60 cap*3        Sig: TAKE 1 CAPSULE (0.4 MG) BY MOUTH DAILY  Authorizing Provider: DIANA GARCIA  Ordering User: AURORA RIZZO    Routing refill request to provider for review/approval because:  Patient is > 63 y/o

## 2021-10-29 NOTE — TELEPHONE ENCOUNTER
Lipitor  Routing refill request to provider for review/approval because:  Labs not current:  LDL    Shruti Avendaño RN

## 2021-11-01 NOTE — TELEPHONE ENCOUNTER
Prescription approved per [facility] Refill Protocol.  Maru Baez RN on 11/1/2021 at 7:13 AM

## 2021-11-22 NOTE — DISCHARGE INSTRUCTIONS
Covid test is negative.  I strongly recommend the patient and family get vaccinated against COVID-19.  We will treat him for pneumonia.  He received Rocephin and a azithromycin 2 strong antibiotics IV in the emergency department.  He also received his first dose of Decadron IV.  We will continue the a azithromycin and Decadron for 4 more days.  Increase his nebulizer to every 4 hours for the next 1 to 2 days and then as needed.  Return to the emergency department if he is getting weaker more short of breath or more ill.

## 2021-11-22 NOTE — ED NOTES
Patient's daughter upset about the visitor policy (e.g. no visitors with COVID like illness until negative COVID test is obtained).  Confirmed with skyler Cobb core charge nurse no exceptions can be made. Aware of plan for COVID swab upon rooming and after negative result 1 family member can join them. Family to car at this time. Pt in lobby while awaiting available room.

## 2021-11-22 NOTE — ED TRIAGE NOTES
Pt here with daughter who states productive cough and mild SOA. Lives alone with family frequently checking in on him. Also receives home care. Per daughter yearly respiratory issues of similar presentation. Pt has not been vaccinated. No reported ill exposure. Presentation above started Friday.

## 2021-11-22 NOTE — ED PROVIDER NOTES
"  History     Chief Complaint   Patient presents with     Cough   History is per the patient and daughter Debora who brought the patient in  [facility]  Ter Fischer is a 89 year old male who presents to the emergency department with a \"6-month history of cough and congestion).\"  Per the patient's daughter Debora she states that he has a chronic cough which is productive of usually clear spit.  This has become more colored.  He took a turn over the weekend with increased cough congestion and difficulty breathing.  Fever unknown.  Patient is unvaccinated against COVID-19.  Has been around grandchildren who have \"colds\".  Rest of family is reported unvaccinated.  Patient has a history of COPD but in review of the chart does not have a history of frequent ED visits or hospitalizations for such.  Daughter states that he has become much weaker over the last 6 months with decreased intake, decreased activity, \"sleeping all the time\".  He has a diagnosis of COPD but quit smoking in 1970 after 10 years of smoking.    Allergies:  Allergies   Allergen Reactions     No Known Drug Allergies        Problem List:    Patient Active Problem List    Diagnosis Date Noted     COPD (chronic obstructive pulmonary disease) (H) 06/30/2021     Priority: Medium     Abdominal aortic aneurysm (AAA) without rupture (H) 06/28/2019     Priority: Medium     Generalized muscle weakness 03/27/2019     Priority: Medium     Head pain 03/27/2019     Priority: Medium     Nutritional deficiency 08/11/2017     Priority: Medium     Anxiety 11/22/2016     Priority: Medium     Hyponatremia 11/09/2016     Priority: Medium     Chronic rhinitis 01/06/2016     Priority: Medium     Chronic left shoulder pain 01/06/2016     Priority: Medium     Reflux esophagitis 01/06/2016     Priority: Medium     Iron deficiency anemia 01/06/2016     Priority: Medium     History  of basal cell carcinoma 01/10/2013     Priority: Medium     IMO update changed this record. 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     Priority: Medium        Past Medical History:    Past Medical History:   Diagnosis Date     Basal cell carcinoma 09/2012     Basal cell carcinoma 9/19/2012     Hypertension      Osteoarthrosis, unspecified whether generalized or localized, unspecified site      Pure hypercholesterolemia      URTICARIA NOS 12/4/2001       Past Surgical History:    Past Surgical History:   Procedure Laterality Date     CATARACT IOL, RT/LT  7/15    Rt and Lt     COLONOSCOPY N/A 10/11/2016    Procedure: COLONOSCOPY;  Surgeon: Julia Bhatti MD;  Location: [facility]     ESOPHAGOSCOPY, GASTROSCOPY, DUODENOSCOPY (EGD), COMBINED N/A 10/11/2016    Procedure: COMBINED ESOPHAGOSCOPY, GASTROSCOPY, DUODENOSCOPY (EGD), BIOPSY SINGLE OR MULTIPLE;  Surgeon: Julia Bhatti MD;  Location: [facility]     HC REMOVAL OF LEG VEINS/ULCER  1996    left     HC REPAIR ROTATOR CUFF,ACUTE  09/16/2002    Rotator cuff repair, right shoulder, with anterior decompression.     HC REVISE MEDIAN N/CARPAL TUNNEL SURG  04/19/10     LAPAROSCOPIC CHOLECYSTECTOMY N/A 10/14/2016    Procedure: LAPAROSCOPIC CHOLECYSTECTOMY;  Surgeon: Julia Bhatti MD;  Location: [facility]     ZC NONSPECIFIC PROCEDURE  1980's    Hernia      ZZC NONSPECIFIC PROCEDURE  10/2012    mass removal on scalp - melonoma     ZZ COLONOSCOPY W/WO BRUSH/WASH  07/26/05    diverticolosis Repeat in 5yrs       Family History:    Family History   Problem Relation Age of Onset     Diabetes Mother         Adult Onset     Cancer Sister      Diabetes Sister      Hypertension Sister      Cerebrovascular Disease Sister      Heart Disease Brother         valve replacement       Social History:  Marital Status:    "[2]  Social History     Tobacco Use     Smoking status: Former Smoker     Packs/day: 1.00     Years: 10.00     Pack years: 10.00     Types: Cigarettes     Quit date: 1972     Years since quittin.9     Smokeless tobacco: Never Used   Substance Use Topics     Alcohol use: No     Alcohol/week: 0.0 standard drinks     Drug use: No        Medications:    azithromycin (ZITHROMAX Z-HIGINIO) 250 MG tablet  dexamethasone (DECADRON) 6 MG tablet  ipratropium - albuterol 0.5 mg/2.5 mg/3 mL (DUONEB) 0.5-2.5 (3) MG/3ML neb solution  triamcinolone (KENALOG) 0.1 % external cream  amLODIPine (NORVASC) 2.5 MG tablet  atenolol (TENORMIN) 50 MG tablet  atorvastatin (LIPITOR) 20 MG tablet  azelastine (ASTELIN) 0.1 % nasal spray  cetirizine (ZYRTEC) 10 MG tablet  docusate sodium (COLACE) 100 MG capsule  finasteride (PROSCAR) 5 MG tablet  FLUoxetine (PROZAC) 10 MG capsule  losartan (COZAAR) 50 MG tablet  omeprazole (PRILOSEC) 20 MG DR capsule  pseudoePHEDrine-guaiFENesin (MUCINEX D)  MG 12 hr tablet  tamsulosin (FLOMAX) 0.4 MG capsule  vitamin B complex with vitamin C (VITAMIN  B COMPLEX) tablet  vitamin D3 (CHOLECALCIFEROL) 50 mcg (2000 units) tablet          Review of Systems   All other systems reviewed and are negative.      Physical Exam   BP: (!) 175/86  Pulse: 63  Temp: 98.3  F (36.8  C)  Resp: 18  Height: 165.1 cm (5' 5\")  Weight: 79.4 kg (175 lb)  SpO2: 99 %      Physical Exam  Vitals and nursing note reviewed.   Constitutional:       General: He is in acute distress.      Appearance: He is ill-appearing.      Comments: Alert elderly thin frail 89-year-old who appears to be in moderate respiratory distress with increased work of breathing and prolonged expiratory phase.he is however afebrile and his O2 sat is normal on room air.  Vs  BP (!) 175/86   Pulse 63   Temp 98.3  F (36.8  C) (Oral)   Resp 18   Ht 1.651 m (5' 5\")   Wt 79.4 kg (175 lb)   SpO2 99%   BMI 29.12 kg/m       HENT:      Head: Normocephalic.      " Right Ear: Tympanic membrane, ear canal and external ear normal.      Left Ear: Tympanic membrane, ear canal and external ear normal.      Nose: Nose normal.      Mouth/Throat:      Mouth: Mucous membranes are dry.   Eyes:      Conjunctiva/sclera: Conjunctivae normal.   Cardiovascular:      Rate and Rhythm: Normal rate and regular rhythm.      Pulses: Normal pulses.      Heart sounds: Normal heart sounds.   Pulmonary:      Effort: Pulmonary effort is normal.      Breath sounds: Normal breath sounds.   Abdominal:      General: Abdomen is flat.      Palpations: Abdomen is soft.   Musculoskeletal:         General: Normal range of motion.      Cervical back: Normal range of motion and neck supple.   Skin:     General: Skin is warm.      Capillary Refill: Capillary refill takes less than 2 seconds.   Neurological:      General: No focal deficit present.      Mental Status: He is alert and oriented to person, place, and time.   Psychiatric:         Mood and Affect: Mood normal.         Behavior: Behavior normal.         ED Course        Procedures              Critical Care time:  none               Results for orders placed or performed during the hospital encounter of 11/22/21 (from the past 24 hour(s))   CBC with platelets differential    Narrative    The following orders were created for panel order CBC with platelets differential.  Procedure                               Abnormality         Status                     ---------                               -----------         ------                     CBC with platelets and d...[[id number]]  Abnormal            Final result                 Please view results for these tests on the individual orders.   Comprehensive metabolic panel   Result Value Ref Range    Sodium 141 133 - 144 mmol/L    Potassium 3.6 3.4 - 5.3 mmol/L    Chloride 112 (H) 94 - 109 mmol/L    Carbon Dioxide (CO2) 27 20 - 32 mmol/L    Anion Gap 2 (L) 3 - 14 mmol/L    Urea Nitrogen 21 7 - 30 mg/dL     Creatinine 0.94 0.66 - 1.25 mg/dL    Calcium 8.1 (L) 8.5 - 10.1 mg/dL    Glucose 123 (H) 70 - 99 mg/dL    Alkaline Phosphatase 138 40 - 150 U/L    AST 14 0 - 45 U/L    ALT 15 0 - 70 U/L    Protein Total 6.3 (L) 6.8 - 8.8 g/dL    Albumin 2.7 (L) 3.4 - 5.0 g/dL    Bilirubin Total 0.4 0.2 - 1.3 mg/dL    GFR Estimate 71 >60 mL/min/1.73m2   Magnesium   Result Value Ref Range    Magnesium 1.8 1.6 - 2.3 mg/dL   Lactic acid whole blood   Result Value Ref Range    Lactic Acid 0.8 0.7 - 2.0 mmol/L   Blood gas venous   Result Value Ref Range    pH Venous 7.37 7.32 - 7.43    pCO2 Venous 47 40 - 50 mm Hg    pO2 Venous 39 25 - 47 mm Hg    Bicarbonate Venous 27 21 - 28 mmol/L    Base Excess/Deficit (+/-) 1.7 -7.7 - 1.9 mmol/L    FIO2 21    Troponin I   Result Value Ref Range    Troponin I <0.015 0.000 - 0.045 ug/L   CRP inflammation   Result Value Ref Range    CRP Inflammation 72.7 (H) 0.0 - 8.0 mg/L   Symptomatic Influenza A/B & SARS-CoV2 (COVID-19) Virus PCR Multiplex Nasopharyngeal    Specimen: Nasopharyngeal; Swab   Result Value Ref Range    Influenza A target Negative Negative    Influenza B target Negative Negative    SARS CoV2 PCR Negative Negative    Narrative    Testing was performed using the jeanette SARS-CoV-2 & Influenza A/B Assay on the jeanette Lula System. This test should be ordered for the detection of SARS-CoV-2 and influenza viruses in individuals who meet clinical and/or epidemiological criteria. Test performance is unknown in asymptomatic patients. This test is for in vitro diagnostic use under the FDA EUA for laboratories certified under CLIA to perform moderate and/or high complexity testing. This test has not been FDA cleared or approved. A negative result does not rule out the presence of PCR inhibitors in the specimen or target RNA in concentration below the limit of detection for the assay. If only one viral target is positive but coinfection with multiple targets is suspected, the sample should be re-tested  with another FDA cleared, approved or authorized test, if coinfection would change clinical management. [facility] are certified under the Clinical Laboratory Improvement Amendments of 1988 (CLIA-88) as  qualified to perform moderate and/or high complexity laboratory testing.   CBC with platelets and differential   Result Value Ref Range    WBC Count 9.6 4.0 - 11.0 10e3/uL    RBC Count 3.61 (L) 4.40 - 5.90 10e6/uL    Hemoglobin 10.2 (L) 13.3 - 17.7 g/dL    Hematocrit 31.7 (L) 40.0 - 53.0 %    MCV 88 78 - 100 fL    MCH 28.3 26.5 - 33.0 pg    MCHC 32.2 31.5 - 36.5 g/dL    RDW 14.9 10.0 - 15.0 %    Platelet Count 194 150 - 450 10e3/uL    % Neutrophils 82 %    % Lymphocytes 11 %    % Monocytes 6 %    % Eosinophils 0 %    % Basophils 0 %    % Immature Granulocytes 1 %    NRBCs per 100 WBC 0 <1 /100    Absolute Neutrophils 7.8 1.6 - 8.3 10e3/uL    Absolute Lymphocytes 1.0 0.8 - 5.3 10e3/uL    Absolute Monocytes 0.6 0.0 - 1.3 10e3/uL    Absolute Eosinophils 0.0 0.0 - 0.7 10e3/uL    Absolute Basophils 0.0 0.0 - 0.2 10e3/uL    Absolute Immature Granulocytes 0.1 (H) <=0.0 10e3/uL    Absolute NRBCs 0.0 10e3/uL   [place] Draw    Narrative    The following orders were created for panel order [place] Draw.  Procedure                               Abnormality         Status                     ---------                               -----------         ------                     Extra Blood Culture Bottle[[id number]]                       Final result                 Please view results for these tests on the individual orders.   Extra Blood Culture Bottle   Result Value Ref Range    Hold Specimen JIC    XR Chest Port 1 View    Narrative    CHEST ONE VIEW PORTABLE   11/22/2021 9:52 AM     HISTORY: Dyspnea/shortness of breath.     COMPARISON: 7/6/2021.      Impression    IMPRESSION: Mild groundglass and interstitial opacities are present,  possibly indicating COVID 19. No lobar consolidation, pneumothorax,  or  pleural effusion. Heart size normal.     ABEBA TATUM MD         SYSTEM ID:  [id number]       Medications   sodium chloride (PF) 0.9% PF flush 3 mL (has no administration in time range)   sodium chloride (PF) 0.9% PF flush 3 mL (has no administration in time range)   cefTRIAXone (ROCEPHIN) 1 g vial to attach to  mL bag for ADULTS or NS 50 mL bag for PEDS (1 g Intravenous New Bag 11/22/21 1053)   azithromycin (ZITHROMAX) 500 mg vial to attach to  mL bag (has no administration in time range)   dexamethasone PF (DECADRON) injection 6 mg (6 mg Intravenous Given 11/22/21 0949)   ipratropium - albuterol 0.5 mg/2.5 mg/3 mL (DUONEB) neb solution 3 mL (3 mLs Nebulization Given 11/22/21 1042)       Assessments & Plan (with Medical Decision Making)   MDM--89-year-old with history of COPD and very remote smoking history who has a chronic productive cough which has worsened lately.  He has not been exposed to COVID-19.  He is unvaccinated against COVID-19.  His COVID-19 test here is negative however his chest x-ray does show some groundglass like opacities.  His CRP is elevated.  I elected to treat him for community-acquired pneumonia given his age and negative Covid test.  We will give him Rocephin and a azithromycin and finish up a Z-Jamal at home.  We will also get him a nebulizer as I suspect this will help with his sputum production and breathing.  Also recommended we give him a short course of Decadron.  The possibility that his Covid test is erroneously negative was discussed but the nurse who did the test is highly experienced and got a good swab.  He is afebrile.  O2 sats are normal on room air.  This was all discussed with his daughter Debora at [phone] and in person.  We will treat him for purulent bronchitis/community-acquired pneumonia and he was given Rocephin 1 g in the ED and 500 mg of a azithromycin.  Will complete a note patient course of azithromycin.  He was also given 6 mg of Decadron IV  and this will be continued for 4 more days with 6 mg p.o. daily.  Increased fluids and rest.  Return to the ED if he develops increasing difficulty breathing.  He responds well to nebulizer treatments here in the ED and his O2 sats stays 99% on room air.  He does have a very productive cough and a history consistent with chronic purulent bronchitis.  Red flags and reasons to bring him back were discussed with the patient and his daughter Debora and are are comfortable with this treatment plan and the patient discharged in good condition.  Because of his age and chronic bronchitis I strongly recommended that the patient and family members being vaccinated against COVID-19.  I have reviewed the nursing notes.    I have reviewed the findings, diagnosis, plan and need for follow up with the patient.       New Prescriptions    AZITHROMYCIN (ZITHROMAX Z-HIGINIO) 250 MG TABLET    Take 1 tablet a day for 4 days (first dose given in ED)    DEXAMETHASONE (DECADRON) 6 MG TABLET    1 tablet daily for 4 days    IPRATROPIUM - ALBUTEROL 0.5 MG/2.5 MG/3 ML (DUONEB) 0.5-2.5 (3) MG/3ML NEB SOLUTION    Take 1 vial (3 mLs) by nebulization every 6 hours as needed for shortness of breath / dyspnea or wheezing       Final diagnoses:   Acute bronchitis, unspecified organism   COPD exacerbation (H)   Physical deconditioning   Essential hypertension with goal blood pressure less than 140/90       11/22/2021   [facility] EMERGENCY DEPT     Kamron, Mike YANEZ MD  11/22/21 3670

## 2021-11-23 NOTE — TELEPHONE ENCOUNTER
Prescription approved per [facility] Refill Protocol.    Maru Baez RN on 11/23/2021 at 12:56 PM

## 2021-11-29 NOTE — PROGRESS NOTES
Assessment & Plan     Chronic obstructive pulmonary disease, unspecified COPD type (H)  COPD exacerbation, the patient has been on duo nebs, has some infiltrates on his chest x-ray but was negative for Covid, does not want a Covid vaccine today.  I did recommend meds.  His family is against it.  He was sort of open to it.  We will get him a flu shot.  We will try him on a long-acting steroid as he did well with Decadron.  Giving him Dulera.  Continuing the duo nebs every 6 hours.  - mometasone-formoterol (DULERA) 200-5 MCG/ACT inhaler; Inhale 2 puffs into the lungs 2 times daily  - ipratropium - albuterol 0.5 mg/2.5 mg/3 mL (DUONEB) 0.5-2.5 (3) MG/3ML neb solution; Take 1 vial (3 mLs) by nebulization every 6 hours    Fatigue, unspecified type  Fatigue probably from age related to his breathing issues, he also has some anemia that was worse in the ER we will recheck his hemoglobin, iron studies and reticulocyte count.  We discussed the full work-up would include an EGD and colonoscopy but he and his family do not want that.  He denies any sign of bleeding.  We will start off with the lab work and see if he could benefit from iron.    Anxiety  Chronic anxiety we will refill his Prozac.  - FLUoxetine (PROZAC) 20 MG capsule; Take 1 capsule (20 mg) by mouth daily    Anemia, unspecified type  Anemia please see above.  - CBC with platelets and differential; Future  - Iron and iron binding capacity; Future  - Reticulocyte count; Future                 Return in about 3 months (around 2/28/2022) for Follow up this diagnosis.    Sin Gill MD  [facility] LACEY Toledo is a 89 year old who presents for the following health issues  accompanied by his son and daughter.    History of Present Illness       He eats 0-1 servings of fruits and vegetables daily.He consumes 2 sweetened beverage(s) daily.He exercises with enough effort to increase his heart rate 9 or less minutes per day.  He  exercises with enough effort to increase his heart rate 3 or less days per week.   He is taking medications regularly.       ED/UC Followup:    Facility:  [facility]  Date of visit: 11/22/2021  Reason for visit: Acute Bronchitis   Current Status: A little better     Was in the ER last week, got the zpak and steroids.  Been coughing for a long time.  Antibiotics helped some, sticky phlegm.  Sleeps a lot, living alone with help from his kids.  Was taking duonebs twice a day now been at every 4hours.            Review of Systems   Fatigue  Continue to cough, continued shortness of breath      Objective    BP (!) 140/60   Pulse 65   Temp 98.5  F (36.9  C)   Resp 8   Wt 87.5 kg (193 lb)   SpO2 95%   BMI 32.12 kg/m    There is no height or weight on file to calculate BMI.  Physical Exam   No acute distress sitting in a wheelchair accompanied by his son and daughter  Heart is regular  Lungs have some coarse breath sounds throughout

## 2021-12-06 NOTE — TELEPHONE ENCOUNTER
Martin RN with [facility] calling for verbal orders for skilled nurse visit every 60 days, HHA 2x week for 1 week, and 3x week for 8 weeks. Please advise as next scheduled visit is 12/8. Angela Rios LPN

## 2021-12-13 NOTE — TELEPHONE ENCOUNTER
Reason for Call:  Form, our goal is to have forms completed with 72 hours, however, some forms may require a visit or additional information.    Type of letter, form or note:  Home Health Certification    Who is the form from?: Home care    Where did the form come from: form was faxed in    What clinic location was the form placed at?: [facility]     Where the form was placed: Given to MA/PAOLA Vee    What number is listed as a contact on the form?: [phone]       Additional comments:

## 2021-12-29 NOTE — PROGRESS NOTES
HEARING AID DROP-OFF    Patient Name:  Tre Fischer    Patient Age:   89 year old    :  1930    Background:   The patient dropped off his hearing aids because they were not working well.     SIDE: Both   MAKE: Gordon   MODEL: 3 Series i110 312d   S/N: unknown   WARRANTY:     Procedures:   A biological listening check revealed good sound from the right hearing aid and weak sound from the left hearing aid. Both hearing aids were cleaned and checked, the mics and microphone covers were suctioned, and the wax guards were changed. A listening check revealed good sound from both hearing aids after cleaning.    Plan:   The hearing aids were returned to the patient's family member, who had waited in the lobby.      2021Winnie, CCC-A  Licensed Audiologist  2021

## 2022-01-01 ENCOUNTER — TELEPHONE (OUTPATIENT)
Dept: INTERNAL MEDICINE | Facility: CLINIC | Age: 87
End: 2022-01-01
Payer: COMMERCIAL

## 2022-01-01 ENCOUNTER — MYC MEDICAL ADVICE (OUTPATIENT)
Dept: INTERNAL MEDICINE | Facility: CLINIC | Age: 87
End: 2022-01-01
Payer: COMMERCIAL

## 2022-01-01 ENCOUNTER — E-VISIT (OUTPATIENT)
Dept: INTERNAL MEDICINE | Facility: CLINIC | Age: 87
End: 2022-01-01
Payer: COMMERCIAL

## 2022-01-01 ENCOUNTER — MYC MEDICAL ADVICE (OUTPATIENT)
Dept: INTERNAL MEDICINE | Facility: CLINIC | Age: 87
End: 2022-01-01

## 2022-01-01 ENCOUNTER — VIRTUAL VISIT (OUTPATIENT)
Dept: INTERNAL MEDICINE | Facility: CLINIC | Age: 87
End: 2022-01-01
Payer: COMMERCIAL

## 2022-01-01 ENCOUNTER — LAB REQUISITION (OUTPATIENT)
Dept: LAB | Facility: CLINIC | Age: 87
End: 2022-01-01
Payer: COMMERCIAL

## 2022-01-01 ENCOUNTER — DOCUMENTATION ONLY (OUTPATIENT)
Dept: OTHER | Facility: CLINIC | Age: 87
End: 2022-01-01
Payer: COMMERCIAL

## 2022-01-01 ENCOUNTER — TELEPHONE (OUTPATIENT)
Dept: INTERNAL MEDICINE | Facility: CLINIC | Age: 87
End: 2022-01-01

## 2022-01-01 DIAGNOSIS — E87.6 HYPOKALEMIA: ICD-10-CM

## 2022-01-01 DIAGNOSIS — J44.9 CHRONIC OBSTRUCTIVE PULMONARY DISEASE, UNSPECIFIED COPD TYPE (H): ICD-10-CM

## 2022-01-01 DIAGNOSIS — I87.2 VENOUS STASIS DERMATITIS OF LEFT LOWER EXTREMITY: ICD-10-CM

## 2022-01-01 DIAGNOSIS — R53.83 FATIGUE, UNSPECIFIED TYPE: Primary | ICD-10-CM

## 2022-01-01 DIAGNOSIS — D50.9 IRON DEFICIENCY ANEMIA, UNSPECIFIED: ICD-10-CM

## 2022-01-01 DIAGNOSIS — J44.9 CHRONIC OBSTRUCTIVE PULMONARY DISEASE, UNSPECIFIED COPD TYPE (H): Primary | ICD-10-CM

## 2022-01-01 DIAGNOSIS — A04.8 H. PYLORI INFECTION: ICD-10-CM

## 2022-01-01 DIAGNOSIS — R53.83 FATIGUE, UNSPECIFIED TYPE: ICD-10-CM

## 2022-01-01 DIAGNOSIS — R41.89 OTHER SYMPTOMS AND SIGNS INVOLVING COGNITIVE FUNCTIONS AND AWARENESS: ICD-10-CM

## 2022-01-01 DIAGNOSIS — E87.6 HYPOKALEMIA: Primary | ICD-10-CM

## 2022-01-01 DIAGNOSIS — R06.02 SOB (SHORTNESS OF BREATH): ICD-10-CM

## 2022-01-01 DIAGNOSIS — N40.0 PROSTATE HYPERTROPHY: ICD-10-CM

## 2022-01-01 DIAGNOSIS — Z53.9 DIAGNOSIS NOT YET DEFINED: Primary | ICD-10-CM

## 2022-01-01 DIAGNOSIS — F41.9 ANXIETY: ICD-10-CM

## 2022-01-01 LAB
ALBUMIN SERPL-MCNC: 2 G/DL (ref 3.4–5)
ALP SERPL-CCNC: 83 U/L (ref 40–150)
ALT SERPL W P-5'-P-CCNC: 18 U/L (ref 0–70)
ANION GAP SERPL CALCULATED.3IONS-SCNC: 7 MMOL/L (ref 3–14)
AST SERPL W P-5'-P-CCNC: 18 U/L (ref 0–45)
BASOPHILS # BLD AUTO: 0.1 10E3/UL (ref 0–0.2)
BASOPHILS NFR BLD AUTO: 0 %
BILIRUB SERPL-MCNC: 0.5 MG/DL (ref 0.2–1.3)
BUN SERPL-MCNC: 33 MG/DL (ref 7–30)
CALCIUM SERPL-MCNC: 8.5 MG/DL (ref 8.5–10.1)
CHLORIDE BLD-SCNC: 107 MMOL/L (ref 94–109)
CO2 SERPL-SCNC: 26 MMOL/L (ref 20–32)
CREAT SERPL-MCNC: 0.98 MG/DL (ref 0.66–1.25)
EOSINOPHIL # BLD AUTO: 0 10E3/UL (ref 0–0.7)
EOSINOPHIL NFR BLD AUTO: 0 %
ERYTHROCYTE [DISTWIDTH] IN BLOOD BY AUTOMATED COUNT: 16.2 % (ref 10–15)
GFR SERPL CREATININE-BSD FRML MDRD: 73 ML/MIN/1.73M2
GLUCOSE BLD-MCNC: 180 MG/DL (ref 70–99)
HCT VFR BLD AUTO: 34 % (ref 40–53)
HGB BLD-MCNC: 11 G/DL (ref 13.3–17.7)
IMM GRANULOCYTES # BLD: 0.6 10E3/UL
IMM GRANULOCYTES NFR BLD: 3 %
LYMPHOCYTES # BLD AUTO: 0.6 10E3/UL (ref 0.8–5.3)
LYMPHOCYTES NFR BLD AUTO: 3 %
MCH RBC QN AUTO: 28.1 PG (ref 26.5–33)
MCHC RBC AUTO-ENTMCNC: 32.4 G/DL (ref 31.5–36.5)
MCV RBC AUTO: 87 FL (ref 78–100)
MONOCYTES # BLD AUTO: 0.4 10E3/UL (ref 0–1.3)
MONOCYTES NFR BLD AUTO: 2 %
NEUTROPHILS # BLD AUTO: 21.6 10E3/UL (ref 1.6–8.3)
NEUTROPHILS NFR BLD AUTO: 92 %
NRBC # BLD AUTO: 0 10E3/UL
NRBC BLD AUTO-RTO: 0 /100
NT-PROBNP SERPL-MCNC: 6577 PG/ML (ref 0–450)
PLATELET # BLD AUTO: 281 10E3/UL (ref 150–450)
POTASSIUM BLD-SCNC: 2.9 MMOL/L (ref 3.4–5.3)
PROT SERPL-MCNC: 6.1 G/DL (ref 6.8–8.8)
RBC # BLD AUTO: 3.92 10E6/UL (ref 4.4–5.9)
SODIUM SERPL-SCNC: 140 MMOL/L (ref 133–144)
WBC # BLD AUTO: 23.2 10E3/UL (ref 4–11)

## 2022-01-01 PROCEDURE — G0180 MD CERTIFICATION HHA PATIENT: HCPCS | Performed by: INTERNAL MEDICINE

## 2022-01-01 PROCEDURE — 99214 OFFICE O/P EST MOD 30 MIN: CPT | Mod: 95 | Performed by: INTERNAL MEDICINE

## 2022-01-01 PROCEDURE — 83880 ASSAY OF NATRIURETIC PEPTIDE: CPT | Mod: ORL | Performed by: INTERNAL MEDICINE

## 2022-01-01 PROCEDURE — 85025 COMPLETE CBC W/AUTO DIFF WBC: CPT | Mod: ORL | Performed by: INTERNAL MEDICINE

## 2022-01-01 PROCEDURE — 99421 OL DIG E/M SVC 5-10 MIN: CPT | Performed by: INTERNAL MEDICINE

## 2022-01-01 PROCEDURE — 80053 COMPREHEN METABOLIC PANEL: CPT | Mod: ORL | Performed by: INTERNAL MEDICINE

## 2022-01-01 RX ORDER — LORAZEPAM 0.5 MG/1
0.5 TABLET ORAL EVERY 6 HOURS PRN
Qty: 10 TABLET | Refills: 0 | Status: SHIPPED | OUTPATIENT
Start: 2022-01-01

## 2022-01-01 RX ORDER — IPRATROPIUM BROMIDE AND ALBUTEROL SULFATE 2.5; .5 MG/3ML; MG/3ML
SOLUTION RESPIRATORY (INHALATION)
Qty: 270 ML | Refills: 1 | Status: SHIPPED | OUTPATIENT
Start: 2022-01-01

## 2022-01-01 RX ORDER — POTASSIUM CHLORIDE 750 MG/1
20 TABLET, EXTENDED RELEASE ORAL 2 TIMES DAILY
Qty: 60 TABLET | Refills: 3 | Status: SHIPPED | OUTPATIENT
Start: 2022-01-01 | End: 2022-01-01

## 2022-01-01 RX ORDER — PREDNISONE 20 MG/1
TABLET ORAL
Qty: 15 TABLET | Refills: 0 | Status: SHIPPED | OUTPATIENT
Start: 2022-01-01

## 2022-01-01 RX ORDER — DOXYCYCLINE 100 MG/1
100 CAPSULE ORAL 2 TIMES DAILY
Qty: 20 CAPSULE | Refills: 0 | Status: SHIPPED | OUTPATIENT
Start: 2022-01-01

## 2022-01-01 RX ORDER — POTASSIUM CHLORIDE 750 MG/1
20 TABLET, EXTENDED RELEASE ORAL 2 TIMES DAILY
Qty: 60 TABLET | Refills: 3 | OUTPATIENT
Start: 2022-01-01

## 2022-01-01 RX ORDER — DEXTROMETHORPHAN POLISTIREX 30 MG/5ML
60 SUSPENSION ORAL 2 TIMES DAILY
COMMUNITY

## 2022-01-01 RX ORDER — IPRATROPIUM BROMIDE AND ALBUTEROL SULFATE 2.5; .5 MG/3ML; MG/3ML
1 SOLUTION RESPIRATORY (INHALATION) EVERY 6 HOURS
Qty: 270 ML | Refills: 0 | Status: SHIPPED | OUTPATIENT
Start: 2022-01-01 | End: 2022-01-01

## 2022-01-01 RX ORDER — ACETAMINOPHEN 500 MG
500-1000 TABLET ORAL EVERY 6 HOURS PRN
COMMUNITY

## 2022-01-01 RX ORDER — ALBUTEROL SULFATE 90 UG/1
2 AEROSOL, METERED RESPIRATORY (INHALATION)
Qty: 18 G | Refills: 3 | Status: SHIPPED | OUTPATIENT
Start: 2022-01-01

## 2022-01-01 RX ORDER — FINASTERIDE 5 MG/1
TABLET, FILM COATED ORAL
Qty: 30 TABLET | Refills: 5 | Status: SHIPPED | OUTPATIENT
Start: 2022-01-01

## 2022-01-01 RX ORDER — IPRATROPIUM BROMIDE AND ALBUTEROL SULFATE 2.5; .5 MG/3ML; MG/3ML
1 SOLUTION RESPIRATORY (INHALATION) EVERY 6 HOURS PRN
COMMUNITY

## 2022-01-01 RX ORDER — PREDNISONE 20 MG/1
20 TABLET ORAL DAILY
Qty: 5 TABLET | Refills: 0 | Status: SHIPPED | OUTPATIENT
Start: 2022-01-01

## 2022-01-01 RX ORDER — FUROSEMIDE 20 MG
TABLET ORAL
Qty: 30 TABLET | Refills: 0 | Status: SHIPPED | OUTPATIENT
Start: 2022-01-01 | End: 2022-01-01

## 2022-01-01 RX ORDER — POTASSIUM CHLORIDE 750 MG/1
20 TABLET, EXTENDED RELEASE ORAL 2 TIMES DAILY
Qty: 60 TABLET | Refills: 3 | Status: SHIPPED | OUTPATIENT
Start: 2022-01-01

## 2022-01-01 RX ORDER — FUROSEMIDE 20 MG
40 TABLET ORAL DAILY
Qty: 60 TABLET | Refills: 0 | Status: SHIPPED | OUTPATIENT
Start: 2022-01-01

## 2022-01-01 RX ORDER — AZITHROMYCIN 250 MG/1
TABLET, FILM COATED ORAL
Qty: 6 TABLET | Refills: 0 | Status: SHIPPED | OUTPATIENT
Start: 2022-01-01 | End: 2022-01-01

## 2022-01-01 RX ORDER — AZITHROMYCIN 250 MG/1
TABLET, FILM COATED ORAL
Qty: 6 TABLET | Refills: 0 | Status: SHIPPED | OUTPATIENT
Start: 2022-01-01

## 2022-01-01 RX ORDER — PREDNISONE 20 MG/1
20 TABLET ORAL DAILY
Qty: 10 TABLET | Refills: 0 | Status: SHIPPED | OUTPATIENT
Start: 2022-01-01

## 2022-01-01 RX ORDER — PREDNISONE 20 MG/1
20 TABLET ORAL DAILY
Qty: 5 TABLET | Refills: 0 | Status: SHIPPED | OUTPATIENT
Start: 2022-01-01 | End: 2022-01-01

## 2022-01-11 NOTE — TELEPHONE ENCOUNTER
Spoke with Martin at [facility], the visit in November is within the 90 day window and also the E-visit would be considered a visit as well.    Patient needs a new referral for HomeCare because of Medicare in order to do the 'short term' changes because he is currently on long term care with Home Care.    Please submit referral.

## 2022-01-13 NOTE — TELEPHONE ENCOUNTER
Reason for Call:  Home Health Care    Sintia with [facility] called regarding (reason for call): verbal orders needed    Orders are needed for this patient.     PT: eval    OT: eval    Skilled Nursin times per week times 4 weeks and 1 time per week times 5 weeks    HHA  - 1 time per week times 5 weeks and 3 times per week times 5 weeks.    Pt Provider: Dr Sin Gill    Phone Number Homecare Nurse can be reached at: [phone]    Can we leave a detailed message on this number? YES    Phone number patient can be reached at:     Best Time:     Call taken on 2022 at 2:53 PM by Monica Goldstein

## 2022-01-14 NOTE — PROGRESS NOTES
Tre is a 89 year old who is being evaluated via a billable video visit.      How would you like to obtain your AVS? MyChart  If the video visit is dropped, the invitation should be resent by: Text to cell phone: [phone] will do visit on my chart   Will anyone else be joining your video visit? Yes: Ava . How would they like to receive their invitation? Text to cell phone: [phone]      Video Start Time: 9:51 AM    Assessment & Plan   Problem List Items Addressed This Visit     Anxiety    Relevant Medications    LORazepam (ATIVAN) 0.5 MG tablet    COPD (chronic obstructive pulmonary disease) (H) - Primary    Relevant Medications    predniSONE (DELTASONE) 20 MG tablet    furosemide (LASIX) 20 MG tablet    doxycycline hyclate (VIBRAMYCIN) 100 MG capsule    LORazepam (ATIVAN) 0.5 MG tablet    albuterol (PROAIR HFA/PROVENTIL HFA/VENTOLIN HFA) 108 (90 Base) MCG/ACT inhaler    Other Relevant Orders    Comprehensive metabolic panel (BMP + Alb, Alk Phos, ALT, AST, Total. Bili, TP)    CBC with platelets and differential    No CPR- Do NOT Intubate      Other Visit Diagnoses     SOB (shortness of breath)        Relevant Medications    predniSONE (DELTASONE) 20 MG tablet    furosemide (LASIX) 20 MG tablet    doxycycline hyclate (VIBRAMYCIN) 100 MG capsule    LORazepam (ATIVAN) 0.5 MG tablet    albuterol (PROAIR HFA/PROVENTIL HFA/VENTOLIN HFA) 108 (90 Base) MCG/ACT inhaler    Other Relevant Orders    Comprehensive metabolic panel (BMP + Alb, Alk Phos, ALT, AST, Total. Bili, TP)    BNP-N terminal pro    CBC with platelets and differential    Fatigue, unspecified type             89-year-old gentleman who has end-stage COPD. He is having more shortness of breath, fatigue and anxiety. He is accompanied by his daughter today. We have tried him on prednisone and antibiotics. He did get a little bit better but still has shortness of breath easily with any movement. We will extend his steroids and try him on a higher dose,  I will try a new antibiotic of doxycycline for him. We will also try him on Ativan up to twice a day to help with anxiety. If not getting better we will then need to move to hospice and he is willing to do this he is clearly DNR/DNI and have updated his CODE STATUS.    He does have home care for COPD    He is also having more gurgling and could have some fluid retention may have not been weaning him worsening swelling but we will try him on low-dose Lasix as well. Hopefully he will be better by next week otherwise we will proceed with hospice.                     No follow-ups on file.    Sin Gill MD  [facility] LACEY Toledo is a 89 year old who presents for the following health issues  accompanied by his daughter.    HPI     Acute Illness  Acute illness concerns: cough  Onset/Duration: 2 weeks on going since November   Symptoms:  Fever: YES, low grade  Chills/Sweats: YES  Headache (location?): no  Sinus Pressure: no  Conjunctivitis:  no  Ear Pain: no  Rhinorrhea: YES  Congestion: YES  Sore Throat: no  Cough: YES-with shortness of breath  Wheeze: no  Decreased Appetite: YES  Nausea: no  Vomiting: no  Diarrhea: YES, this morning   Dysuria/Freq.: no  Dysuria or Hematuria: no  Fatigue/Achiness: YES  Sick/Strep Exposure: no  Therapies tried and outcome: neb 3 to 4 times a day, inhaler twice a day. Were about ready to call ambulance this morning.     Breathing is hard, gurgling, been on prednisone and zpak.   No swelling.  Gets sob and then a panic.     Homecare and looked fine then.     Slight fever and chills, occasional diarrhea.      POLST, DNR/DNI and possible hospice.       Review of Systems         Objective           Vitals:  No vitals were obtained today due to virtual visit.    Physical Exam   GENERAL: Healthy, alert and no distress  EYES: Eyes grossly normal to inspection.  No discharge or erythema, or obvious scleral/conjunctival abnormalities.  RESP: coughing attacks  and some gurgling, sputum coughing up.   SKIN: Visible skin clear. No significant rash, abnormal pigmentation or lesions.  NEURO: Cranial nerves grossly intact.  Mentation and speech appropriate for age.  PSYCH: Mentation appears normal, affect normal/bright, judgement and insight intact, normal speech and appearance well-groomed.            Video-Visit Details    Type of service:  Video Visit    Video End Time:10:13 AM    Originating Location (pt. Location): Home    Distant Location (provider location):  [facility]     Platform used for Video Visit: eASIC

## 2022-01-17 NOTE — TELEPHONE ENCOUNTER
Labs after getting a diuretic shows potassium at 2.9.  We will start him on potassium chloride 20 mill equivalents daily.  Sent to [facility].

## 2022-01-18 NOTE — TELEPHONE ENCOUNTER
Caity notified of ok for home care orders and UA.   [facility] will need a rx for oxygen faxed to them at [phone].

## 2022-01-18 NOTE — TELEPHONE ENCOUNTER
Okay for home care orders.  I ordered a urinalysis for him.  Okay to start oxygen at 2 L nasal cannula.

## 2022-01-18 NOTE — TELEPHONE ENCOUNTER
Caity OT - [facility]  PHONE: [phone] - ok to leave message, secure voicemail     1x per week next week  1x in 2 weeks    Orders for UA collection on Friday with HomeCare RN patient has some increased confusion and they are wondering about an infection    SAT levels are dropping below 90% with minimal movement, wondering about patient being put on oxygen?    Melinda Roberson XRO/

## 2022-01-19 NOTE — TELEPHONE ENCOUNTER
Verbal Orders    PT:    2x per week for 3 weeks    O2 sat today was as low as 66% today with activity, PT is aware that oxygen has been ordered, just AUDRA Bennett PT - [facility]  Phone: [phone]      Melinda Roberson XRO/TeamCoordinator

## 2022-01-20 NOTE — TELEPHONE ENCOUNTER
Reason for Call:  Form, our goal is to have forms completed with 72 hours, however, some forms may require a visit or additional information.    Type of letter, form or note:  Home Health Certification    Who is the form from?: Home care    Where did the form come from: form was faxed in    What clinic location was the form placed at?: [facility]    Where the form was placed: Given to RADHA Reis    What number is listed as a contact on the form?: [phone]       Additional comments: [facility]    Call taken on 1/20/2022 at 3:25 PM by Melinda Roberson

## 2022-01-21 NOTE — TELEPHONE ENCOUNTER
Called nakul at [facility] relayed info. She will inform family of pt. Closing enc.

## 2022-01-21 NOTE — TELEPHONE ENCOUNTER
nakul @ [facility] home care calling to relay pt's oxygen sats for trending for request for home oxygen.     At rest 87-91%. Lowest 86%. With activity 87-94%    Pt had been Below 89 % on  1/18,1/19/, 1/21. Family says sats are same when home care isnt there

## 2022-01-25 NOTE — TELEPHONE ENCOUNTER
Called by home care on this patient.  She was there with the family.  Patient is having more short of breath, crackles on exam and hypoxia.  We are still trying to get oxygen for him.  But I did explain that this getting oxygen will not cure him of all of his symptoms.  I think he needs to be on hospice.  We will try another round of antibiotics and prednisone we will increase his Lasix from 20 to 40 mg and they agreed to go with hospice to try to get morphine to help calm down his breathing.

## 2022-02-03 NOTE — TELEPHONE ENCOUNTER
Routing refill request to provider for review/approval because:  Labs out of range:  Potassium.     Irma Finch RN

## 2022-02-04 NOTE — TELEPHONE ENCOUNTER
Proscar  Prilosec    Prescriptions approved per [facility] Refill Protocol.    Shruti Avendaño RN

## 2022-02-11 NOTE — TELEPHONE ENCOUNTER
Duoneb  Prescription approved per [facility] Refill Protocol.    Shruti Avendaño RN

## 2022-03-07 ENCOUNTER — TELEPHONE (OUTPATIENT)
Dept: INTERNAL MEDICINE | Facility: CLINIC | Age: 87
End: 2022-03-07
Payer: COMMERCIAL

## 2022-03-07 RX ORDER — TRIAMCINOLONE ACETONIDE 1 MG/G
CREAM TOPICAL
Qty: 454 G | Refills: 1 | Status: SHIPPED | OUTPATIENT
Start: 2022-03-07

## 2022-03-07 NOTE — TELEPHONE ENCOUNTER
Kenalog  Prescription approved per [facility] Refill Protocol.    Shruti Avendaño RN

## 2022-03-17 NOTE — TELEPHONE ENCOUNTER
I approve of requested home care orders.    Khoi Bartolome Chacko    
Labs/EKG/Imaging Studies/Medications